# Patient Record
Sex: MALE | Race: WHITE | HISPANIC OR LATINO | Employment: FULL TIME | URBAN - METROPOLITAN AREA
[De-identification: names, ages, dates, MRNs, and addresses within clinical notes are randomized per-mention and may not be internally consistent; named-entity substitution may affect disease eponyms.]

---

## 2017-02-06 ENCOUNTER — PATIENT MESSAGE (OUTPATIENT)
Dept: FAMILY MEDICINE | Facility: CLINIC | Age: 72
End: 2017-02-06

## 2017-02-07 RX ORDER — NEBIVOLOL 10 MG/1
10 TABLET ORAL DAILY
Qty: 90 TABLET | Refills: 3 | Status: SHIPPED | OUTPATIENT
Start: 2017-02-07 | End: 2018-01-26 | Stop reason: SDUPTHER

## 2017-11-13 ENCOUNTER — PATIENT MESSAGE (OUTPATIENT)
Dept: FAMILY MEDICINE | Facility: CLINIC | Age: 72
End: 2017-11-13

## 2017-12-02 ENCOUNTER — PATIENT MESSAGE (OUTPATIENT)
Dept: FAMILY MEDICINE | Facility: CLINIC | Age: 72
End: 2017-12-02

## 2017-12-04 ENCOUNTER — TELEPHONE (OUTPATIENT)
Dept: FAMILY MEDICINE | Facility: CLINIC | Age: 72
End: 2017-12-04

## 2017-12-04 DIAGNOSIS — H53.9 VISION ABNORMALITIES: Primary | ICD-10-CM

## 2018-01-26 ENCOUNTER — OFFICE VISIT (OUTPATIENT)
Dept: FAMILY MEDICINE | Facility: CLINIC | Age: 73
End: 2018-01-26
Payer: MEDICARE

## 2018-01-26 VITALS
SYSTOLIC BLOOD PRESSURE: 113 MMHG | OXYGEN SATURATION: 98 % | TEMPERATURE: 98 F | DIASTOLIC BLOOD PRESSURE: 71 MMHG | BODY MASS INDEX: 27.98 KG/M2 | HEART RATE: 58 BPM | WEIGHT: 206.56 LBS | HEIGHT: 72 IN

## 2018-01-26 DIAGNOSIS — G47.33 OBSTRUCTIVE SLEEP APNEA SYNDROME: ICD-10-CM

## 2018-01-26 DIAGNOSIS — Z00.00 MEDICARE ANNUAL WELLNESS VISIT, SUBSEQUENT: Primary | ICD-10-CM

## 2018-01-26 DIAGNOSIS — I10 ESSENTIAL HYPERTENSION: ICD-10-CM

## 2018-01-26 DIAGNOSIS — E78.5 HYPERLIPIDEMIA, UNSPECIFIED HYPERLIPIDEMIA TYPE: ICD-10-CM

## 2018-01-26 DIAGNOSIS — G47.00 INSOMNIA, UNSPECIFIED TYPE: ICD-10-CM

## 2018-01-26 PROCEDURE — 99213 OFFICE O/P EST LOW 20 MIN: CPT | Mod: PBBFAC,PO | Performed by: FAMILY MEDICINE

## 2018-01-26 PROCEDURE — G0439 PPPS, SUBSEQ VISIT: HCPCS | Mod: ,,, | Performed by: FAMILY MEDICINE

## 2018-01-26 PROCEDURE — 99999 PR PBB SHADOW E&M-EST. PATIENT-LVL III: CPT | Mod: PBBFAC,,, | Performed by: FAMILY MEDICINE

## 2018-01-26 RX ORDER — NITROGLYCERIN 4 MG/G
OINTMENT RECTAL
Refills: 0 | COMMUNITY
Start: 2018-01-22 | End: 2019-02-20

## 2018-01-26 RX ORDER — CIPROFLOXACIN 500 MG/1
TABLET ORAL
COMMUNITY
Start: 2017-10-31 | End: 2018-01-26

## 2018-01-26 RX ORDER — NEBIVOLOL 10 MG/1
10 TABLET ORAL DAILY
Qty: 90 TABLET | Refills: 3 | Status: SHIPPED | OUTPATIENT
Start: 2018-01-26 | End: 2019-01-28 | Stop reason: SDUPTHER

## 2018-01-26 RX ORDER — ONDANSETRON 8 MG/1
TABLET, ORALLY DISINTEGRATING ORAL
COMMUNITY
Start: 2017-10-30 | End: 2018-01-26

## 2018-01-26 RX ORDER — LISINOPRIL 20 MG/1
20 TABLET ORAL DAILY
COMMUNITY
End: 2018-01-26 | Stop reason: SDUPTHER

## 2018-01-26 RX ORDER — HYDROXYZINE PAMOATE 50 MG/1
50 CAPSULE ORAL NIGHTLY
Qty: 90 CAPSULE | Refills: 3 | Status: SHIPPED | OUTPATIENT
Start: 2018-01-26 | End: 2019-01-28 | Stop reason: SDUPTHER

## 2018-01-26 RX ORDER — LIDOCAINE HYDROCHLORIDE 20 MG/ML
JELLY TOPICAL
Refills: 0 | COMMUNITY
Start: 2018-01-22 | End: 2019-02-20

## 2018-01-26 RX ORDER — LISINOPRIL 20 MG/1
20 TABLET ORAL DAILY
Qty: 90 TABLET | Refills: 3 | Status: SHIPPED | OUTPATIENT
Start: 2018-01-26 | End: 2018-12-21

## 2018-01-26 NOTE — PROGRESS NOTES
(Portions of this note were dictated using voice recognition software and may contain dictation related errors in spelling/grammar/syntax not found on text review)    CC:   Chief Complaint   Patient presents with    Annual Exam       HPI: 72 y.o. male here for annual wellness visit.  Last visit was 12/20/16.    Hypertension on Bystolic 10 mg daily, lisinopril 20 mg daily.     Hyperlipidemia, takes red yeast rice.  No other statin therapy.  Was on Lipitor in the past was concerned about some potential cognitive side effects resulting from this and got off of this    Hydroxyzine for sleep    Prior history of vitamin D deficiency, currently taking vitamin D regularly 1000 units daily.    Past Medical History:   Diagnosis Date    Hyperlipidemia     Hypertension     Insomnia     CRIS (obstructive sleep apnea)        Past Surgical History:   Procedure Laterality Date    BASAL CELL CARCINOMA EXCISION      cataract surgery         Family History   Problem Relation Age of Onset    Prostate cancer Brother      prostate problems, unknown cancer    Heart attack Brother 72    Hypertension Brother        Social History     Social History    Marital status:      Spouse name: N/A    Number of children: N/A    Years of education: N/A     Occupational History    primary care physician, [employer]      Social History Main Topics    Smoking status: Former Smoker    Smokeless tobacco: Not on file    Alcohol use No    Drug use: No    Sexual activity: Not on file     Other Topics Concern    Not on file     Social History Narrative    Regular exercise .  Healthy diet     SCREENINGS:    Depression Screening PHQ-9     Over the last two weeks, how often have you been bothered by any of the following problems?  Not at all  Several days  More than half the days  Nearly every day    Little interest or pleasure in doing things  0 1 2 3   Feeling down, depressed, or hopeless  0 1 2 3   Trouble falling or staying  asleep, or sleeping too much  0 1 2 3   Feeling tired or having little energy  0 1 2 3   Poor appetite or overeating  0 1 2 3   Feeling bad about yourself, or that you are a failure, or have let yourself or your family down  0 1 2 3   Trouble concentrating on things, such as reading the newspaper or watching television  0 1 2 3   Moving or speaking so slowly that other people could have noticed? Or the opposite, being so fidgety or restless that you have been moving around a lot more than usual  0 1 2 3   Thoughts that you would be better off dead or of hurting yourself in some way  0 1 2 3   Total 0_ =  ___ + ___ + ___ + ___   PHQ-9 Score ?10: Likely major depression.    Depression score ranges:    5 to 9: mild    10 to 14: moderate    15 to 19: moderately severe    ?20: severe    If you checked off any problems, how difficult have these problems made it for you to do your work, take care of things at home, or get along with other people?  Not difficult at all  ___ Somewhat difficult  ___ Very difficult  ___ Extremely difficult  ___      Function ability and level of safety  Hearing impairment: no  ADL ability: no  Fall risk: no  Home safety issues: no     End of Life Planning  Living will/advance directive? yes       Immunizations  Flu shot  utd  Zostavax 2012  Pneumovax: up to date   Prevnar : utd  tdap 2016     Prostate screening: Declined screening for now    Colonoscopy: Sees Dr. Morris every 5 years for screening colonoscopy last colonoscopy was in 2016     Lab Results   Component Value Date    WBC 8.63 12/31/2016    HGB 15.0 12/31/2016    HCT 42.7 12/31/2016     12/31/2016    CHOL 134 12/31/2016    TRIG 259 (H) 12/31/2016    HDL 30 (L) 12/31/2016    ALT 26 12/31/2016    AST 28 12/31/2016     12/31/2016    K 4.3 12/31/2016     12/31/2016    CREATININE 1.1 12/31/2016    CALCIUM 8.5 (L) 12/31/2016    BUN 15 12/31/2016    CO2 26 12/31/2016    TSH 1.117 12/31/2016    PSA 2.23 10/13/2012     LDLCALC 52.2 (L) 12/31/2016    GLU 91 12/31/2016           ROS:  GENERAL: No fever, chills, fatigability or weight loss.  SKIN: No rashes, no itching.  HEAD: No headaches.  EYES: No visual changes  EARS: No ear pain or changes in hearing.  NOSE: No congestion or rhinorrhea.  MOUTH & THROAT: No hoarseness, change in voice, or sore throat.  NODES: Denies swollen glands.  CHEST: Denies VALLADARES, cyanosis, wheezing, cough and sputum production.  CARDIOVASCULAR: Denies chest pain, PND, orthopnea.  ABDOMEN: No nausea, vomiting, or changes in bowel function.  URINARY: No flank pain, dysuria or hematuria.  PERIPHERAL VASCULAR: No claudication or cyanosis.  MUSCULOSKELETAL: No joint stiffness or swelling. Denies back pain.  NEUROLOGIC: No weakness or numbness.    Vital signs reviewed  PE:   APPEARANCE: Well nourished, well developed, in no acute distress.    HEAD: Normocephalic, atraumatic.  EYES: PERRL. EOMI.   Conjunctivae noninjected.  EARS: TM's intact. Light reflex normal. No retraction or perforation  NOSE: Mucosa pink. Airway clear.  MOUTH & THROAT: No tonsillar enlargement. No pharyngeal erythema or exudate.   NECK: Supple with no cervical lymphadenopathy.    CHEST: Good inspiratory effort. Lungs clear to auscultation with no wheezes or crackles.  CARDIOVASCULAR: Normal S1, S2. No rubs, murmurs, or gallops.  ABDOMEN: Bowel sounds normal. Not distended. Soft. No tenderness or masses. No organomegaly.  EXTREMITIES: No edema, cyanosis, or clubbing.      IMPRESSION    1. Medicare annual wellness visit, subsequent    2. Essential hypertension    3. Hyperlipidemia, unspecified hyperlipidemia type    4. Obstructive sleep apnea syndrome    5. Insomnia, unspecified type          PLAN  Hypertension controlled.  Continue current therapy    Check labs below    Continue Vistaril for insomnia as needed    Discussed prostate cancer screening the patient declines at this time    Continue vitamin D 1000 units daily.  Discussed getting lab  testing for vitamin D although patient defers for now.    Follow-up one year or when necessary    Orders Placed This Encounter   Procedures    CBC auto differential    Comprehensive metabolic panel    Lipid panel    TSH

## 2018-01-27 ENCOUNTER — LAB VISIT (OUTPATIENT)
Dept: LAB | Facility: HOSPITAL | Age: 73
End: 2018-01-27
Attending: FAMILY MEDICINE
Payer: MEDICARE

## 2018-01-27 DIAGNOSIS — E78.5 HYPERLIPIDEMIA, UNSPECIFIED HYPERLIPIDEMIA TYPE: ICD-10-CM

## 2018-01-27 DIAGNOSIS — I10 ESSENTIAL HYPERTENSION: ICD-10-CM

## 2018-01-27 DIAGNOSIS — Z00.00 MEDICARE ANNUAL WELLNESS VISIT, SUBSEQUENT: ICD-10-CM

## 2018-01-27 LAB
ALBUMIN SERPL BCP-MCNC: 3.9 G/DL
ALP SERPL-CCNC: 62 U/L
ALT SERPL W/O P-5'-P-CCNC: 27 U/L
ANION GAP SERPL CALC-SCNC: 7 MMOL/L
AST SERPL-CCNC: 27 U/L
BASOPHILS # BLD AUTO: 0.01 K/UL
BASOPHILS NFR BLD: 0.2 %
BILIRUB SERPL-MCNC: 0.9 MG/DL
BUN SERPL-MCNC: 15 MG/DL
CALCIUM SERPL-MCNC: 9.5 MG/DL
CHLORIDE SERPL-SCNC: 103 MMOL/L
CHOLEST SERPL-MCNC: 187 MG/DL
CHOLEST/HDLC SERPL: 6 {RATIO}
CO2 SERPL-SCNC: 30 MMOL/L
CREAT SERPL-MCNC: 1.1 MG/DL
DIFFERENTIAL METHOD: NORMAL
EOSINOPHIL # BLD AUTO: 0.1 K/UL
EOSINOPHIL NFR BLD: 1.3 %
ERYTHROCYTE [DISTWIDTH] IN BLOOD BY AUTOMATED COUNT: 13.5 %
EST. GFR  (AFRICAN AMERICAN): >60 ML/MIN/1.73 M^2
EST. GFR  (NON AFRICAN AMERICAN): >60 ML/MIN/1.73 M^2
GLUCOSE SERPL-MCNC: 83 MG/DL
HCT VFR BLD AUTO: 46.9 %
HDLC SERPL-MCNC: 31 MG/DL
HDLC SERPL: 16.6 %
HGB BLD-MCNC: 16.2 G/DL
LDLC SERPL CALC-MCNC: 108.8 MG/DL
LYMPHOCYTES # BLD AUTO: 2.4 K/UL
LYMPHOCYTES NFR BLD: 39.2 %
MCH RBC QN AUTO: 31 PG
MCHC RBC AUTO-ENTMCNC: 34.5 G/DL
MCV RBC AUTO: 90 FL
MONOCYTES # BLD AUTO: 0.5 K/UL
MONOCYTES NFR BLD: 7.4 %
NEUTROPHILS # BLD AUTO: 3.2 K/UL
NEUTROPHILS NFR BLD: 51.9 %
NONHDLC SERPL-MCNC: 156 MG/DL
PLATELET # BLD AUTO: 207 K/UL
PMV BLD AUTO: 9.9 FL
POTASSIUM SERPL-SCNC: 4.5 MMOL/L
PROT SERPL-MCNC: 7.5 G/DL
RBC # BLD AUTO: 5.22 M/UL
SODIUM SERPL-SCNC: 140 MMOL/L
TRIGL SERPL-MCNC: 236 MG/DL
TSH SERPL DL<=0.005 MIU/L-ACNC: 1.2 UIU/ML
WBC # BLD AUTO: 6.23 K/UL

## 2018-01-27 PROCEDURE — 80053 COMPREHEN METABOLIC PANEL: CPT

## 2018-01-27 PROCEDURE — 36415 COLL VENOUS BLD VENIPUNCTURE: CPT

## 2018-01-27 PROCEDURE — 80061 LIPID PANEL: CPT

## 2018-01-27 PROCEDURE — 85025 COMPLETE CBC W/AUTO DIFF WBC: CPT

## 2018-01-27 PROCEDURE — 84443 ASSAY THYROID STIM HORMONE: CPT

## 2018-01-28 ENCOUNTER — PATIENT MESSAGE (OUTPATIENT)
Dept: FAMILY MEDICINE | Facility: CLINIC | Age: 73
End: 2018-01-28

## 2018-12-20 ENCOUNTER — PATIENT MESSAGE (OUTPATIENT)
Dept: FAMILY MEDICINE | Facility: CLINIC | Age: 73
End: 2018-12-20

## 2018-12-21 RX ORDER — LOSARTAN POTASSIUM 50 MG/1
50 TABLET ORAL DAILY
Qty: 90 TABLET | Refills: 3 | Status: SHIPPED | OUTPATIENT
Start: 2018-12-21 | End: 2019-11-05

## 2018-12-21 NOTE — TELEPHONE ENCOUNTER
Spoke to patient.  Was concerned about some coughing that he had had recently.  Resulting from a bad cold but the cough was persistent.  He stopped the lisinopril and the cough did get better.  He is not sure if it is truly ACE-inhibitor related or just related to the URI that he had.  However, he would like to try to switch over to losartan 50 mg instead.  This was sent to his pharmacy.

## 2019-01-28 ENCOUNTER — OFFICE VISIT (OUTPATIENT)
Dept: FAMILY MEDICINE | Facility: CLINIC | Age: 74
End: 2019-01-28
Payer: MEDICARE

## 2019-01-28 VITALS
OXYGEN SATURATION: 98 % | HEIGHT: 72 IN | DIASTOLIC BLOOD PRESSURE: 72 MMHG | SYSTOLIC BLOOD PRESSURE: 115 MMHG | BODY MASS INDEX: 28.01 KG/M2 | HEART RATE: 69 BPM | WEIGHT: 206.81 LBS | TEMPERATURE: 98 F

## 2019-01-28 DIAGNOSIS — Z00.00 MEDICARE ANNUAL WELLNESS VISIT, SUBSEQUENT: Primary | ICD-10-CM

## 2019-01-28 DIAGNOSIS — G47.33 OBSTRUCTIVE SLEEP APNEA SYNDROME: ICD-10-CM

## 2019-01-28 DIAGNOSIS — E78.5 HYPERLIPIDEMIA, UNSPECIFIED HYPERLIPIDEMIA TYPE: ICD-10-CM

## 2019-01-28 DIAGNOSIS — G47.00 INSOMNIA, UNSPECIFIED TYPE: ICD-10-CM

## 2019-01-28 DIAGNOSIS — I10 ESSENTIAL HYPERTENSION: ICD-10-CM

## 2019-01-28 PROCEDURE — 3074F PR MOST RECENT SYSTOLIC BLOOD PRESSURE < 130 MM HG: ICD-10-PCS | Mod: CPTII,S$GLB,ICN, | Performed by: FAMILY MEDICINE

## 2019-01-28 PROCEDURE — 99214 OFFICE O/P EST MOD 30 MIN: CPT | Mod: S$GLB,ICN,, | Performed by: FAMILY MEDICINE

## 2019-01-28 PROCEDURE — 99214 PR OFFICE/OUTPT VISIT, EST, LEVL IV, 30-39 MIN: ICD-10-PCS | Mod: S$GLB,ICN,, | Performed by: FAMILY MEDICINE

## 2019-01-28 PROCEDURE — 3078F DIAST BP <80 MM HG: CPT | Mod: CPTII,S$GLB,ICN, | Performed by: FAMILY MEDICINE

## 2019-01-28 PROCEDURE — 3078F PR MOST RECENT DIASTOLIC BLOOD PRESSURE < 80 MM HG: ICD-10-PCS | Mod: CPTII,S$GLB,ICN, | Performed by: FAMILY MEDICINE

## 2019-01-28 PROCEDURE — 3074F SYST BP LT 130 MM HG: CPT | Mod: CPTII,S$GLB,ICN, | Performed by: FAMILY MEDICINE

## 2019-01-28 PROCEDURE — 99999 PR PBB SHADOW E&M-EST. PATIENT-LVL III: CPT | Mod: PBBFAC,,, | Performed by: FAMILY MEDICINE

## 2019-01-28 PROCEDURE — 99999 PR PBB SHADOW E&M-EST. PATIENT-LVL III: ICD-10-PCS | Mod: PBBFAC,,, | Performed by: FAMILY MEDICINE

## 2019-01-28 RX ORDER — HYDROXYZINE PAMOATE 50 MG/1
50 CAPSULE ORAL NIGHTLY
Qty: 90 CAPSULE | Refills: 3 | Status: SHIPPED | OUTPATIENT
Start: 2019-01-28 | End: 2019-11-05

## 2019-01-28 RX ORDER — NEBIVOLOL 10 MG/1
10 TABLET ORAL DAILY
Qty: 90 TABLET | Refills: 3 | Status: SHIPPED | OUTPATIENT
Start: 2019-01-28 | End: 2020-01-31 | Stop reason: SDUPTHER

## 2019-01-28 NOTE — PROGRESS NOTES
(Portions of this note were dictated using voice recognition software and may contain dictation related errors in spelling/grammar/syntax not found on text review)    CC:   Annual exam    HPI: 73 y.o. male here for annual wellness visit.  Last visit was January 26, 2018    Hypertension on Bystolic 10 mg daily, losartan 50 mg a day (was on lisinopril but thought it may be giving him a cough so we switched over to ARB).  Blood pressure stable.  Usually just takes half a losartan a day    Hyperlipidemia, takes red yeast rice.  No other statin therapy.  Was on Lipitor in the past was concerned about some potential cognitive side effects resulting from this and got off of this    Hydroxyzine for sleep    Prior history of vitamin D deficiency, currently taking vitamin D regularly 1000 units daily.    Healthy diet, exercise regularly    Past Medical History:   Diagnosis Date    Hyperlipidemia     Hypertension     Insomnia     CRIS (obstructive sleep apnea)     Vitamin D deficiency        Past Surgical History:   Procedure Laterality Date    BASAL CELL CARCINOMA EXCISION      cataract surgery         Family History   Problem Relation Age of Onset    Prostate cancer Brother         prostate problems, unknown cancer    Heart attack Brother 72    Hypertension Brother        Social History     Socioeconomic History    Marital status:      Spouse name: Not on file    Number of children: Not on file    Years of education: Not on file    Highest education level: Not on file   Social Needs    Financial resource strain: Not on file    Food insecurity - worry: Not on file    Food insecurity - inability: Not on file    Transportation needs - medical: Not on file    Transportation needs - non-medical: Not on file   Occupational History    Occupation: primary care physician, [employer]   Tobacco Use    Smoking status: Former Smoker   Substance and Sexual Activity    Alcohol use: No    Drug use: No     Sexual activity: Not on file   Other Topics Concern    Not on file   Social History Narrative    Regular exercise .  Healthy diet     SCREENINGS:    Depression Screening PHQ-9     Over the last two weeks, how often have you been bothered by any of the following problems?  Not at all  Several days  More than half the days  Nearly every day    Little interest or pleasure in doing things  0 1 2 3   Feeling down, depressed, or hopeless  0 1 2 3   Trouble falling or staying asleep, or sleeping too much  0 1 2 3   Feeling tired or having little energy  0 1 2 3   Poor appetite or overeating  0 1 2 3   Feeling bad about yourself, or that you are a failure, or have let yourself or your family down  0 1 2 3   Trouble concentrating on things, such as reading the newspaper or watching television  0 1 2 3   Moving or speaking so slowly that other people could have noticed? Or the opposite, being so fidgety or restless that you have been moving around a lot more than usual  0 1 2 3   Thoughts that you would be better off dead or of hurting yourself in some way  0 1 2 3   Total 0_ =  ___ + ___ + ___ + ___   PHQ-9 Score ?10: Likely major depression.    Depression score ranges:    5 to 9: mild    10 to 14: moderate    15 to 19: moderately severe    ?20: severe    If you checked off any problems, how difficult have these problems made it for you to do your work, take care of things at home, or get along with other people?  Not difficult at all  ___ Somewhat difficult  ___ Very difficult  ___ Extremely difficult  ___      Function ability and level of safety  Hearing impairment: no  ADL ability: no  Fall risk: no  Home safety issues: no     End of Life Planning  Living will/advance directive? yes         Lab Results   Component Value Date    WBC 6.23 01/27/2018    HGB 16.2 01/27/2018    HCT 46.9 01/27/2018     01/27/2018    CHOL 187 01/27/2018    TRIG 236 (H) 01/27/2018    HDL 31 (L) 01/27/2018    ALT 27 01/27/2018    AST 27  01/27/2018     01/27/2018    K 4.5 01/27/2018     01/27/2018    CREATININE 1.1 01/27/2018    CALCIUM 9.5 01/27/2018    BUN 15 01/27/2018    CO2 30 (H) 01/27/2018    TSH 1.198 01/27/2018    PSA 2.23 10/13/2012    LDLCALC 108.8 01/27/2018    GLU 83 01/27/2018           ROS:  GENERAL: No fever, chills, fatigability or weight loss.  SKIN: No rashes, no itching.  HEAD: No headaches.  EYES: No visual changes  EARS: No ear pain or changes in hearing.  NOSE: No congestion or rhinorrhea.  MOUTH & THROAT: No hoarseness, change in voice, or sore throat.  NODES: Denies swollen glands.  CHEST: Denies VALLADARES, cyanosis, wheezing, cough and sputum production.  CARDIOVASCULAR: Denies chest pain, PND, orthopnea.  ABDOMEN: No nausea, vomiting, or changes in bowel function.  URINARY: No flank pain, dysuria or hematuria.  PERIPHERAL VASCULAR: No claudication or cyanosis.  MUSCULOSKELETAL: No joint stiffness or swelling. Denies back pain.  NEUROLOGIC: No weakness or numbness.    Vital signs reviewed  PE:   APPEARANCE: Well nourished, well developed, in no acute distress.    HEAD: Normocephalic, atraumatic.  EYES: PERRL. EOMI.   Conjunctivae noninjected.  EARS: TM's intact. Light reflex normal. No retraction or perforation  NOSE: Mucosa pink. Airway clear.  MOUTH & THROAT: No tonsillar enlargement. No pharyngeal erythema or exudate.   NECK: Supple with no cervical lymphadenopathy.  No carotid bruits.  No thyromegaly  CHEST: Good inspiratory effort. Lungs clear to auscultation with no wheezes or crackles.  CARDIOVASCULAR: Normal S1, S2. No rubs, murmurs, or gallops.  ABDOMEN: Bowel sounds normal. Not distended. Soft. No tenderness or masses. No organomegaly.  EXTREMITIES: No edema, cyanosis, or clubbing.      IMPRESSION    1. Medicare annual wellness visit, subsequent    2. Essential hypertension    3. Obstructive sleep apnea syndrome    4. Hyperlipidemia, unspecified hyperlipidemia type    5. Insomnia, unspecified type           PLAN  Hypertension controlled.  Continue current therapy    Check labs below    Continue Vistaril for insomnia as needed    Continue vitamin D 1000 units daily.      Follow-up one year or when necessary    Orders Placed This Encounter   Procedures    CBC auto differential    Comprehensive metabolic panel    Lipid panel    TSH         HEALTH SCREENINGS  Immunizations  Flu shot  utd  Zostavax 2012  Pneumovax: up to date   Prevnar : utd  tdap 2016     Prostate screening: Declined screening for now    Colonoscopy: Sees Dr. Morris every 5 years for screening colonoscopy last colonoscopy was in 2016       Answers for HPI/ROS submitted by the patient on 1/27/2019   activity change: No  unexpected weight change: No  neck pain: No  hearing loss: No  rhinorrhea: No  trouble swallowing: No  eye discharge: No  visual disturbance: No  chest tightness: No  wheezing: No  chest pain: No  palpitations: No  blood in stool: No  constipation: No  vomiting: No  diarrhea: No  polydipsia: No  polyuria: No  difficulty urinating: No  urgency: No  hematuria: No  joint swelling: No  arthralgias: No  headaches: No  weakness: No  confusion: No  dysphoric mood: No

## 2019-02-02 ENCOUNTER — LAB VISIT (OUTPATIENT)
Dept: LAB | Facility: HOSPITAL | Age: 74
End: 2019-02-02
Attending: FAMILY MEDICINE
Payer: MEDICARE

## 2019-02-02 DIAGNOSIS — Z00.00 MEDICARE ANNUAL WELLNESS VISIT, SUBSEQUENT: ICD-10-CM

## 2019-02-02 DIAGNOSIS — E78.5 HYPERLIPIDEMIA, UNSPECIFIED HYPERLIPIDEMIA TYPE: ICD-10-CM

## 2019-02-02 DIAGNOSIS — G47.33 OBSTRUCTIVE SLEEP APNEA SYNDROME: ICD-10-CM

## 2019-02-02 DIAGNOSIS — G47.00 INSOMNIA, UNSPECIFIED TYPE: ICD-10-CM

## 2019-02-02 DIAGNOSIS — I10 ESSENTIAL HYPERTENSION: ICD-10-CM

## 2019-02-02 LAB
ALBUMIN SERPL BCP-MCNC: 3.7 G/DL
ALP SERPL-CCNC: 63 U/L
ALT SERPL W/O P-5'-P-CCNC: 23 U/L
ANION GAP SERPL CALC-SCNC: 10 MMOL/L
AST SERPL-CCNC: 28 U/L
BASOPHILS # BLD AUTO: 0.01 K/UL
BASOPHILS NFR BLD: 0.2 %
BILIRUB SERPL-MCNC: 1 MG/DL
BUN SERPL-MCNC: 10 MG/DL
CALCIUM SERPL-MCNC: 9.4 MG/DL
CHLORIDE SERPL-SCNC: 104 MMOL/L
CHOLEST SERPL-MCNC: 169 MG/DL
CHOLEST/HDLC SERPL: 5.3 {RATIO}
CO2 SERPL-SCNC: 26 MMOL/L
CREAT SERPL-MCNC: 0.9 MG/DL
DIFFERENTIAL METHOD: NORMAL
EOSINOPHIL # BLD AUTO: 0.1 K/UL
EOSINOPHIL NFR BLD: 1.3 %
ERYTHROCYTE [DISTWIDTH] IN BLOOD BY AUTOMATED COUNT: 13.1 %
EST. GFR  (AFRICAN AMERICAN): >60 ML/MIN/1.73 M^2
EST. GFR  (NON AFRICAN AMERICAN): >60 ML/MIN/1.73 M^2
GLUCOSE SERPL-MCNC: 96 MG/DL
HCT VFR BLD AUTO: 47.1 %
HDLC SERPL-MCNC: 32 MG/DL
HDLC SERPL: 18.9 %
HGB BLD-MCNC: 15.5 G/DL
IMM GRANULOCYTES # BLD AUTO: 0.02 K/UL
IMM GRANULOCYTES NFR BLD AUTO: 0.4 %
LDLC SERPL CALC-MCNC: 95.4 MG/DL
LYMPHOCYTES # BLD AUTO: 1.5 K/UL
LYMPHOCYTES NFR BLD: 28.3 %
MCH RBC QN AUTO: 30 PG
MCHC RBC AUTO-ENTMCNC: 32.9 G/DL
MCV RBC AUTO: 91 FL
MONOCYTES # BLD AUTO: 0.5 K/UL
MONOCYTES NFR BLD: 9.3 %
NEUTROPHILS # BLD AUTO: 3.2 K/UL
NEUTROPHILS NFR BLD: 60.5 %
NONHDLC SERPL-MCNC: 137 MG/DL
NRBC BLD-RTO: 0 /100 WBC
PLATELET # BLD AUTO: 188 K/UL
PMV BLD AUTO: 9.8 FL
POTASSIUM SERPL-SCNC: 4.3 MMOL/L
PROT SERPL-MCNC: 7 G/DL
RBC # BLD AUTO: 5.16 M/UL
SODIUM SERPL-SCNC: 140 MMOL/L
TRIGL SERPL-MCNC: 208 MG/DL
TSH SERPL DL<=0.005 MIU/L-ACNC: 1.64 UIU/ML
WBC # BLD AUTO: 5.27 K/UL

## 2019-02-02 PROCEDURE — 84443 ASSAY THYROID STIM HORMONE: CPT

## 2019-02-02 PROCEDURE — 80053 COMPREHEN METABOLIC PANEL: CPT

## 2019-02-02 PROCEDURE — 85025 COMPLETE CBC W/AUTO DIFF WBC: CPT

## 2019-02-02 PROCEDURE — 36415 COLL VENOUS BLD VENIPUNCTURE: CPT | Mod: PO

## 2019-02-02 PROCEDURE — 80061 LIPID PANEL: CPT

## 2019-02-17 ENCOUNTER — HOSPITAL ENCOUNTER (EMERGENCY)
Facility: HOSPITAL | Age: 74
Discharge: HOME | End: 2019-02-17
Attending: EMERGENCY MEDICINE
Payer: MEDICARE

## 2019-02-17 VITALS
TEMPERATURE: 99 F | OXYGEN SATURATION: 96 % | RESPIRATION RATE: 14 BRPM | BODY MASS INDEX: 29.12 KG/M2 | HEART RATE: 62 BPM | HEIGHT: 72 IN | SYSTOLIC BLOOD PRESSURE: 134 MMHG | WEIGHT: 215 LBS | DIASTOLIC BLOOD PRESSURE: 87 MMHG

## 2019-02-17 DIAGNOSIS — J02.9 SORE THROAT: ICD-10-CM

## 2019-02-17 DIAGNOSIS — R49.0 HOARSENESS OF VOICE: Primary | ICD-10-CM

## 2019-02-17 DIAGNOSIS — R91.1 PULMONARY NODULE: ICD-10-CM

## 2019-02-17 LAB
ALBUMIN SERPL BCP-MCNC: 3.7 G/DL
ALP SERPL-CCNC: 63 U/L
ALT SERPL W/O P-5'-P-CCNC: 19 U/L
ANION GAP SERPL CALC-SCNC: 12 MMOL/L
AST SERPL-CCNC: 16 U/L
BASOPHILS # BLD AUTO: 0.02 K/UL
BASOPHILS NFR BLD: 0.2 %
BILIRUB SERPL-MCNC: 0.6 MG/DL
BNP SERPL-MCNC: 203 PG/ML
BUN SERPL-MCNC: 13 MG/DL
BUN SERPL-MCNC: 13 MG/DL (ref 6–30)
CALCIUM SERPL-MCNC: 9.6 MG/DL
CHLORIDE SERPL-SCNC: 101 MMOL/L (ref 95–110)
CHLORIDE SERPL-SCNC: 102 MMOL/L
CO2 SERPL-SCNC: 23 MMOL/L
CREAT SERPL-MCNC: 0.8 MG/DL (ref 0.5–1.4)
CREAT SERPL-MCNC: 0.9 MG/DL
DIFFERENTIAL METHOD: NORMAL
EOSINOPHIL # BLD AUTO: 0.1 K/UL
EOSINOPHIL NFR BLD: 0.6 %
ERYTHROCYTE [DISTWIDTH] IN BLOOD BY AUTOMATED COUNT: 13.2 %
EST. GFR  (AFRICAN AMERICAN): >60 ML/MIN/1.73 M^2
EST. GFR  (NON AFRICAN AMERICAN): >60 ML/MIN/1.73 M^2
GLUCOSE SERPL-MCNC: 102 MG/DL
GLUCOSE SERPL-MCNC: 107 MG/DL (ref 70–110)
HCT VFR BLD AUTO: 45.1 %
HCT VFR BLD CALC: 45 %PCV (ref 36–54)
HGB BLD-MCNC: 15.6 G/DL
IMM GRANULOCYTES # BLD AUTO: 0.04 K/UL
IMM GRANULOCYTES NFR BLD AUTO: 0.4 %
LYMPHOCYTES # BLD AUTO: 2.3 K/UL
LYMPHOCYTES NFR BLD: 23.4 %
MCH RBC QN AUTO: 30.9 PG
MCHC RBC AUTO-ENTMCNC: 34.6 G/DL
MCV RBC AUTO: 89 FL
MONOCYTES # BLD AUTO: 0.6 K/UL
MONOCYTES NFR BLD: 6.2 %
NEUTROPHILS # BLD AUTO: 6.8 K/UL
NEUTROPHILS NFR BLD: 69.2 %
NRBC BLD-RTO: 0 /100 WBC
PLATELET # BLD AUTO: 222 K/UL
PMV BLD AUTO: 9.8 FL
POC IONIZED CALCIUM: 1.05 MMOL/L (ref 1.06–1.42)
POC TCO2 (MEASURED): 24 MMOL/L (ref 23–29)
POTASSIUM BLD-SCNC: 4 MMOL/L (ref 3.5–5.1)
POTASSIUM SERPL-SCNC: 4.2 MMOL/L
PROT SERPL-MCNC: 7.1 G/DL
RBC # BLD AUTO: 5.05 M/UL
SAMPLE: ABNORMAL
SODIUM BLD-SCNC: 138 MMOL/L (ref 136–145)
SODIUM SERPL-SCNC: 137 MMOL/L
TROPONIN I SERPL DL<=0.01 NG/ML-MCNC: 0.01 NG/ML
WBC # BLD AUTO: 9.78 K/UL

## 2019-02-17 PROCEDURE — 84484 ASSAY OF TROPONIN QUANT: CPT

## 2019-02-17 PROCEDURE — 80053 COMPREHEN METABOLIC PANEL: CPT

## 2019-02-17 PROCEDURE — 85025 COMPLETE CBC W/AUTO DIFF WBC: CPT

## 2019-02-17 PROCEDURE — 99284 EMERGENCY DEPT VISIT MOD MDM: CPT | Mod: ,,, | Performed by: EMERGENCY MEDICINE

## 2019-02-17 PROCEDURE — 83880 ASSAY OF NATRIURETIC PEPTIDE: CPT

## 2019-02-17 PROCEDURE — 99285 EMERGENCY DEPT VISIT HI MDM: CPT | Mod: 25

## 2019-02-17 PROCEDURE — 25500020 PHARM REV CODE 255: Performed by: EMERGENCY MEDICINE

## 2019-02-17 PROCEDURE — 87632 RESP VIRUS 6-11 TARGETS: CPT

## 2019-02-17 PROCEDURE — 99284 PR EMERGENCY DEPT VISIT,LEVEL IV: ICD-10-PCS | Mod: ,,, | Performed by: EMERGENCY MEDICINE

## 2019-02-17 RX ADMIN — IOHEXOL 100 ML: 350 INJECTION, SOLUTION INTRAVENOUS at 12:02

## 2019-02-17 NOTE — DISCHARGE INSTRUCTIONS
Very nice to meet you and your family!     - try PPI for silent reflux  - follow up with ENT  - follow up with Pulmonary for nodule  - follow up with Cardiology for BNP    Our goal in the emergency department is to always give you outstanding care and exceptional service. You may receive a survey by mail or e-mail in the next week regarding your experience in our ED. We would greatly appreciate your completing and returning the survey. Your feedback provides us with a way to recognize our staff who give very good care and it helps us learn how to improve when your experience was below our aspiration of excellence.

## 2019-02-17 NOTE — ED NOTES
Pt reports treatment for URI x several weeks. His sone reports worsening symptoms along with hoarseness and foreign body sensation. He denies SOB with exertion, fever, chills, edema.

## 2019-02-17 NOTE — ED NOTES
Patient identifiers verified and correct for Maegan Cleary.    LOC: The patient is awake, alert and aware of environment with an appropriate affect, the patient is oriented x 3 and speaking appropriately.  APPEARANCE: Patient resting comfortably and in no acute distress, patient is clean and well groomed, patient's clothing is properly fastened.  SKIN: The skin is warm and dry, color consistent with ethnicity, patient has normal skin turgor and moist mucus membranes, skin intact, no breakdown or bruising noted.  MUSCULOSKELETAL: Patient moving all extremities spontaneously, no obvious swelling or deformities noted.  CARDIAC: Patient has a normal rate and regular rhythm, no periphreal edema noted, capillary refill < 3 seconds.  ABDOMEN: Soft and non tender to palpation, no distention noted, normoactive bowel sounds present in all four quadrants.  NEUROLOGIC: PERRL, 3 mm bilaterally, eyes open spontaneously, behavior appropriate to situation, follows commands, facial expression symmetrical, bilateral hand grasp equal and even, purposeful motor response noted, normal sensation in all extremities when touched with a finger.

## 2019-02-17 NOTE — ED NOTES
Pt resting in bed aware waiting for CT. Pt on cardiac, bp and o2 monitor. RR 20 even and unlabored. Pt son and wife at bedside. TV turned on for pt.

## 2019-02-17 NOTE — ED PROVIDER NOTES
Encounter Date: 2/17/2019    SCRIBE #1 NOTE: I, Kinga Jenkins, am scribing for, and in the presence of,  Dr. Curry. I have scribed the entire note.       History     Chief Complaint   Patient presents with    Foreign Body In Throat     has sensation like something is stuck, throat is sore took, voice hoars     Time seen by provider: 11:18 AM    73 y.o. male with medical conditions including HTN and HLD, who presents with complaint of foreign body sensation on the right side of the throat. Patient sx started approximately 4 months ago with URI sx including sore throat, cough, and rhinorrhea. Patient started on 2 rounds of abx and steroids which provided short term relief. Per family, patient now has new onset voice change ( hoarsness) as well as appears more uncomfortable at rest. Patient received CXR secondary to sx, all of which were unremarkable. Patient states he has been taking HCTZ for sx with no relief of sx. Patient denies cp, SOB, back pain, neck pain, difficulty ranging neck, neck stiffness, n/v/d,unexpected weight change, or night sweats.            The history is provided by the patient.     Review of patient's allergies indicates:  No Known Allergies  Past Medical History:   Diagnosis Date    Hyperlipidemia     Hypertension     Insomnia     CRIS (obstructive sleep apnea)     Vitamin D deficiency      Past Surgical History:   Procedure Laterality Date    BASAL CELL CARCINOMA EXCISION      cataract surgery       Family History   Problem Relation Age of Onset    Prostate cancer Brother         prostate problems, unknown cancer    Heart attack Brother 72    Hypertension Brother      Social History     Tobacco Use    Smoking status: Former Smoker   Substance Use Topics    Alcohol use: No    Drug use: No     Review of Systems   Constitutional: Negative for chills and fever.   HENT: Positive for sore throat and voice change. Negative for facial swelling.    Eyes: Negative for visual  disturbance.   Respiratory: Negative for shortness of breath.    Cardiovascular: Negative for chest pain.   Gastrointestinal: Negative for nausea and vomiting.   Genitourinary: Negative for dysuria and flank pain.   Musculoskeletal: Negative for back pain, neck pain and neck stiffness.   Neurological: Negative for weakness and numbness.   Psychiatric/Behavioral: Negative for behavioral problems and confusion.       Physical Exam     Initial Vitals [02/17/19 1103]   BP Pulse Resp Temp SpO2   (!) 159/89 69 18 98.8 °F (37.1 °C) 99 %      MAP       --         Physical Exam    Nursing note and vitals reviewed.  Constitutional: He appears well-developed and well-nourished. He is not diaphoretic. No distress.   HENT:   Head: Normocephalic and atraumatic.   Mouth/Throat: Uvula is midline and oropharynx is clear and moist. No oropharyngeal exudate, posterior oropharyngeal edema or tonsillar abscesses.   Moderate posterior oropharyngeal erythema, uvula midline, no posterior pharyngeal fullness.     Neck: Normal range of motion. Neck supple. No JVD present.   No masses   Cardiovascular: Normal rate, regular rhythm, normal heart sounds and intact distal pulses.   Pulmonary/Chest: Breath sounds normal. No respiratory distress. He has no wheezes. He has no rhonchi. He has no rales.   Abdominal: Soft. He exhibits no distension. There is no tenderness.   Musculoskeletal: Normal range of motion. He exhibits no edema.   Lymphadenopathy:     He has no cervical adenopathy.   Neurological: He is alert and oriented to person, place, and time. He has normal strength. No cranial nerve deficit or sensory deficit.   Skin: Skin is warm and dry.         ED Course   Procedures  Labs Reviewed   B-TYPE NATRIURETIC PEPTIDE - Abnormal; Notable for the following components:       Result Value     (*)     All other components within normal limits   ISTAT PROCEDURE - Abnormal; Notable for the following components:    POC Ionized Calcium 1.05  (*)     All other components within normal limits   RESPIRATORY VIRAL PANEL BY PCR   CBC W/ AUTO DIFFERENTIAL   COMPREHENSIVE METABOLIC PANEL   TROPONIN I          Imaging Results          CT Neck Chest With Contrast (XPD) (Final result)  Result time 02/17/19 12:55:01    Final result by Bright Caldera DO (02/17/19 12:55:01)                 Impression:      CT neck: Approximation of the vocal cords bilaterally likely related to breath hold during the scan otherwise unremarkable limited CT neck as detailed above    CT thorax: Few ill-defined subcentimeter ground-glass opacities with slight nodularity in the right upper lobe.  These are nonspecific and may be related to active inflammatory/infectious process however indeterminate.  Clinical correlation and follow-up recommended.      Electronically signed by: Bright Caldera DO  Date:    02/17/2019  Time:    12:55             Narrative:    EXAMINATION:  CT NECK CHEST WITH CONTRAST (XPD)    CLINICAL HISTORY:  R  neck pain, (+) voice change, SOB;    TECHNIQUE:  Low dose axial images, coronal and sagittal reformations were obtained from the skull base to the lung bases following the intravenous administration of 100 mL of Omnipaque 350.    COMPARISON:  None.    FINDINGS:  CT neck: The study is limited by artifact from dental metal and patient motion.    In addition there is limitation by incomplete inclusion of the skullbase and nasopharynx.  Visualized nasopharynx is within normal limits.  There is slight prominence of the palatine tonsils within the oropharynx without focal lesion.    No evidence for epiglottic thickening.    There is approximation of the vocal cords likely related to breath hold during the scan.  Trachea within normal limits.    Visualized parotid glands are within normal limits.  The submandibular and thyroid glands are grossly within normal limits allowing for artifact from motion.    No evidence for adenopathy throughout the visualized  neck.    Degenerative change of the spine with grade 1 anterolisthesis of C2 on C3 and C3 on C4.  Allowing for degenerative changes no evidence for acute fracture of the cervical spine.  There is sclerosis in the left aspect of C7 suggestive for bone island.    CT thorax: Ill-defined ground-glass opacities within the posterior aspect right upper lobe largest measuring 8 mm image 81 series 2 this may represent active inflammatory/infectious process however indeterminate.  For a ground glass nodule 6 mm or larger, Fleischner Society 2017 guidelines recommend follow up with non-contrast chest CT at 6-12 months after discovery. If this nodule persists at that time, additional follow up with non-contrast chest CT is recommended every 2 years until 5 years of stability have been documented.    No pleural effusion or pneumothorax.  No hilar mediastinal mass or adenopathy.  Atherosclerotic plaquing of the aorta and in the distribution of the coronary arteries.    Partially visualized hypodensity likely emanating from the upper pole right kidney the periphery of the posterior right lobe of the liver measuring approximately 3.7 cm with Hounsfield units of 11 suggestive for a cyst.    Small sclerotic focus in the right aspect of the manubrium inferiorly suggestive for bone island.  No evidence for acute fracture or subluxation visualized spine                                 Medical Decision Making:   History:   Old Medical Records: I decided to obtain old medical records.  Initial Assessment:   Emergent evaluation of 73 y.o.male here with persistent sore throat, hoarse voice, and difficulty breathing   Differential Diagnosis:   My differential diagnosis includes, but is not limited to: obstruction secondary to mass, retropharyngeal abscess, epiglottitis, laryngitis, silent reflux, PNA   Clinical Tests:   Lab Tests: Ordered and Reviewed  Radiological Study: Ordered and Reviewed  ED Management:  -labs  -CT neck and  chest  -respiratory culture     1:55 PM  Labs reviewed with no significant abnormalities except for a mildly elevated BNP at 200. Patient has a hx of left atrial enlargement. Normal EF. CT reveals no evidence of mass or abscess. There is a 8 mm right upper lobe pulmonary nodule with ill defined boarders. Recommending patient follow up with pulmonary and ENT. His vitals remain stable. Findings discussed with patient and family. They express understanding.            Scribe Attestation:   Scribe #1: I performed the above scribed service and the documentation accurately describes the services I performed. I attest to the accuracy of the note.    Attending Attestation:           Physician Attestation for Scribe:      Comments: I, Dr. Flora Curry, personally performed the services described in this documentation. All medical record entries made by the scribe were at my direction and in my presence.  I have reviewed the chart and agree that the record reflects my personal performance and is accurate and complete. Flora Curry MD.                 Clinical Impression:   The primary encounter diagnosis was Hoarseness of voice. Diagnoses of Sore throat and Pulmonary nodule were also pertinent to this visit.      Disposition:   Disposition: Discharged  Condition: Stable                        Flora Curry MD  02/18/19 0840

## 2019-02-18 ENCOUNTER — TELEPHONE (OUTPATIENT)
Dept: SLEEP MEDICINE | Facility: CLINIC | Age: 74
End: 2019-02-18

## 2019-02-18 NOTE — TELEPHONE ENCOUNTER
----- Message from Chary Cullen sent at 2/18/2019 11:48 AM CST -----  Contact: Pt  Name of Who is Calling:HINA AGUIRRE [[id number]]    What is the request in detail: Patient would like to be seen soon the 03/19/19 , patient states he needs to have his Cpap machine looked at Please contact to further discuss and advise    Can the clinic reply by [facility]:     What Number to Call Back if not in [facility]: [phone]

## 2019-02-19 ENCOUNTER — TELEPHONE (OUTPATIENT)
Dept: SLEEP MEDICINE | Facility: CLINIC | Age: 74
End: 2019-02-19

## 2019-02-19 ENCOUNTER — TELEPHONE (OUTPATIENT)
Dept: PULMONOLOGY | Facility: CLINIC | Age: 74
End: 2019-02-19

## 2019-02-19 DIAGNOSIS — R05.9 COUGH: Primary | ICD-10-CM

## 2019-02-19 LAB
ENTEROVIRUS: NOT DETECTED
HUMAN BOCAVIRUS: NOT DETECTED
HUMAN CORONAVIRUS, COMMON COLD VIRUS: NOT DETECTED
INFLUENZA A - H1N1-09: NOT DETECTED
PARAINFLUENZA: NOT DETECTED
RVP - ADENOVIRUS: NOT DETECTED
RVP - HUMAN METAPNEUMOVIRUS (HMPV): NOT DETECTED
RVP - INFLUENZA A: NOT DETECTED
RVP - INFLUENZA B: NOT DETECTED
RVP - RESPIRATORY SYNCTIAL VIRUS (RSV) A: NOT DETECTED
RVP - RESPIRATORY VIRAL PANEL, SOURCE: NORMAL
RVP - RHINOVIRUS: NOT DETECTED

## 2019-02-19 NOTE — TELEPHONE ENCOUNTER
----- Message from Binh Renteria sent at 2/18/2019  5:45 PM CST -----  Contact: Doctor Madiha  Doctor is requesting an earlier appt due to being seen at [facility] Studys from Echo test is showing problem from sleep apnea. Doctor would like to be seen this week or next week anytime.     Dr. Cleary can be reached at [phone].    Thank You.

## 2019-02-20 ENCOUNTER — TELEPHONE (OUTPATIENT)
Dept: SLEEP MEDICINE | Facility: CLINIC | Age: 74
End: 2019-02-20

## 2019-02-20 ENCOUNTER — OFFICE VISIT (OUTPATIENT)
Dept: OTOLARYNGOLOGY | Facility: CLINIC | Age: 74
End: 2019-02-20
Payer: MEDICARE

## 2019-02-20 VITALS
WEIGHT: 208.75 LBS | HEART RATE: 70 BPM | BODY MASS INDEX: 28.27 KG/M2 | HEIGHT: 72 IN | DIASTOLIC BLOOD PRESSURE: 72 MMHG | SYSTOLIC BLOOD PRESSURE: 119 MMHG

## 2019-02-20 DIAGNOSIS — R49.0 HOARSENESS OF VOICE: ICD-10-CM

## 2019-02-20 DIAGNOSIS — R09.A2 GLOBUS SENSATION: ICD-10-CM

## 2019-02-20 DIAGNOSIS — K21.9 LARYNGOPHARYNGEAL REFLUX (LPR): Primary | ICD-10-CM

## 2019-02-20 PROCEDURE — 3078F DIAST BP <80 MM HG: CPT | Mod: S$GLB,,, | Performed by: OTOLARYNGOLOGY

## 2019-02-20 PROCEDURE — 99204 PR OFFICE/OUTPT VISIT, NEW, LEVL IV, 45-59 MIN: ICD-10-PCS | Mod: 25,S$GLB,, | Performed by: OTOLARYNGOLOGY

## 2019-02-20 PROCEDURE — 31575 PR LARYNGOSCOPY, FLEXIBLE; DIAGNOSTIC: ICD-10-PCS | Mod: S$GLB,,, | Performed by: OTOLARYNGOLOGY

## 2019-02-20 PROCEDURE — 99999 PR PBB SHADOW E&M-EST. PATIENT-LVL III: ICD-10-PCS | Mod: PBBFAC,,, | Performed by: OTOLARYNGOLOGY

## 2019-02-20 PROCEDURE — 3078F PR MOST RECENT DIASTOLIC BLOOD PRESSURE < 80 MM HG: ICD-10-PCS | Mod: S$GLB,,, | Performed by: OTOLARYNGOLOGY

## 2019-02-20 PROCEDURE — 99999 PR PBB SHADOW E&M-EST. PATIENT-LVL III: CPT | Mod: PBBFAC,,, | Performed by: OTOLARYNGOLOGY

## 2019-02-20 PROCEDURE — 3074F PR MOST RECENT SYSTOLIC BLOOD PRESSURE < 130 MM HG: ICD-10-PCS | Mod: S$GLB,,, | Performed by: OTOLARYNGOLOGY

## 2019-02-20 PROCEDURE — 31575 DIAGNOSTIC LARYNGOSCOPY: CPT | Mod: S$GLB,,, | Performed by: OTOLARYNGOLOGY

## 2019-02-20 PROCEDURE — 1101F PT FALLS ASSESS-DOCD LE1/YR: CPT | Mod: S$GLB,,, | Performed by: OTOLARYNGOLOGY

## 2019-02-20 PROCEDURE — 99204 OFFICE O/P NEW MOD 45 MIN: CPT | Mod: 25,S$GLB,, | Performed by: OTOLARYNGOLOGY

## 2019-02-20 PROCEDURE — 1101F PR PT FALLS ASSESS DOC 0-1 FALLS W/OUT INJ PAST YR: ICD-10-PCS | Mod: S$GLB,,, | Performed by: OTOLARYNGOLOGY

## 2019-02-20 PROCEDURE — 3074F SYST BP LT 130 MM HG: CPT | Mod: S$GLB,,, | Performed by: OTOLARYNGOLOGY

## 2019-02-20 RX ORDER — PANTOPRAZOLE SODIUM 40 MG/1
40 TABLET, DELAYED RELEASE ORAL 2 TIMES DAILY
Qty: 60 TABLET | Refills: 11 | Status: SHIPPED | OUTPATIENT
Start: 2019-02-20 | End: 2019-08-16

## 2019-02-20 NOTE — TELEPHONE ENCOUNTER
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 Ayon not the NP. States he went to the emergency department last Sunday and needs a follow up soon. Patient states he is not asking for a same day appointment, he is requesting to be accommodated next week sometime. Please advise.

## 2019-02-20 NOTE — PROGRESS NOTES
Chief Complaint   Patient presents with    Hoarse     started a month ago    Dysphagia    Cough     constantly clearing the throat    Sore Throat   .     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 The onset was approximately 4 months ago after URI sx including sore throat, cough, and rhinorrhea.He was treated with Levaquin and ZPak along with albuterol inhaler without relief. He now has continued intermittent hoarseness, sore throat, increased throat clearing, globus sensation, and tenderness/painful with burning sensation in the back of the throat. He was recently in the ED on 2/17 for the same symptoms. A CT scan of the neck was performed and was normal.        Past Medical History:   Diagnosis Date    Hyperlipidemia     Hypertension     Insomnia     CRIS (obstructive sleep apnea)     Vitamin D deficiency      Social History     Socioeconomic History    Marital status:      Spouse name: Not on file    Number of children: Not on file    Years of education: Not on file    Highest education level: Not on file   Social Needs    Financial resource strain: Not on file    Food insecurity - worry: Not on file    Food insecurity - inability: Not on file    Transportation needs - medical: Not on file    Transportation needs - non-medical: Not on file   Occupational History    Occupation: primary care physician, [employer]   Tobacco Use    Smoking status: Former Smoker   Substance and Sexual Activity    Alcohol use: No    Drug use: No    Sexual activity: Not on file   Other Topics Concern    Not on file   Social History Narrative    Regular exercise .  Healthy diet     Past Surgical History:   Procedure Laterality Date    BASAL CELL CARCINOMA EXCISION      cataract surgery       Family History   Problem Relation Age of Onset    Prostate cancer  Brother         prostate problems, unknown cancer    Heart attack Brother 72    Hypertension Brother            Review of Systems  General: negative for chills, fever or weight loss  Psychological: negative for mood changes or depression  Ophthalmic: negative for blurry vision, photophobia or eye pain  ENT: see HPI  Respiratory: no cough, shortness of breath, or wheezing  Cardiovascular: no chest pain or dyspnea on exertion  Gastrointestinal: no abdominal pain, change in bowel habits, or black/ bloody stools  Musculoskeletal: negative for gait disturbance or muscular weakness  Neurological: no syncope or seizures; no ataxia  Dermatological: negative for puritis,  rash and jaundice  Hematologic/lymphatic: no easy bruising, no new lumps or bumps      Physical Exam:    Vitals:    02/20/19 1257   BP: 119/72   Pulse: 70       Constitutional: Well appearing / communicating without difficutly.  NAD.  Eyes: EOM I Bilaterally  Head/Face: Normocephalic.  Negative paranasal sinus pressure/tenderness.  Salivary glands WNL.  House Brackmann I Bilaterally.    Right Ear: Auricle normal appearance. External Auditory Canal within normal limits no lesions or masses,TM w/o masses/lesions/perforations. TM mobility noted.   Left Ear: Auricle normal appearance. External Auditory Canal within normal limits no lesions or masses,TM w/o masses/lesions/perforations. TM mobility noted.  Nose: No gross nasal septal deviation. Inferior Turbinates 3+ bilaterally. No septal perforation. No masses/lesions. External nasal skin appears normal without masses/lesions.  Oral Cavity: Gingiva/lips within normal limits.  Dentition/gingiva healthy appearing. Mucus membranes moist. Floor of mouth soft, no masses palpated. Oral Tongue mobile. Hard Palate appears normal.    Oropharynx: Base of tongue appears normal. No masses/lesions noted. Tonsillar fossa/pharyngeal wall without lesions. Posterior oropharynx WNL.  Soft palate without masses. Midline uvula.    Neck/Lymphatic: No LAD I-VI bilaterally.  No thyromegaly.  No masses noted on exam.    Mirror laryngoscopy/nasopharyngoscopy: Active gag reflex.  Unable to perform.    Neuro/Psychiatric: AOx3.  Normal mood and affect.   Cardiovascular: Normal carotid pulses bilaterally, no increasing jugular venous distention noted at cervical region bilaterally.    Respiratory: Normal respiratory effort, no stridor, no retractions noted.      See separate procedure note for FFL.     Diagnostic studies reviewed:  CT neck 2/17/2019: Approximation of the vocal cords bilaterally likely related to breath hold during the scan otherwise unremarkable limited CT neck as detailed above    Assessment:    ICD-10-CM ICD-9-CM    1. Laryngopharyngeal reflux (LPR) K21.9 478.79    2. Hoarseness of voice R49.0 784.42    3. Globus sensation F45.8 306.4      The primary encounter diagnosis was Laryngopharyngeal reflux (LPR). Diagnoses of Hoarseness of voice and Globus sensation were also pertinent to this visit.      Plan:  No orders of the defined types were placed in this encounter.      The patient has the physical findings of chronic laryngopharyngeal reflux, which I believe is the cause of the hoarseness. I explained to the patient how it is common to experience throat discomfort, a foreign body sensation, problems swallowing, chronic cough and hoarseness among other things due to reflux, even in the absence of heartburn. Smaller volumes of gastric contents are required to irritate the pharynx than the lower esophagus. Often patients with significant reflux and heartburn will seek medical treatment earlier.     I recommended that the patient start taking Protonix 40mg PO BID* and provided a prescription.     I also recommended that the patient refrain from eating within 3 hours of going to bed at night and that the patient place a 2 x 4 underneath the head board of the bed to elevate the head of bed very subtly and optimize the impact of  gravity on the potential reflux.  I recommended that the patient avoid alcohol, caffeine, tobacco, tomato sauce, spicy foods, fried food, and chocolate.     There is a potential long-term risk of reflux for the development of esophageal cancer, and the data on head and neck malignancy is unclear. Therefore it is important for the patient to be compliant with these recommendations. The patient may benefit from an evaluation by Gastroenterology if the symptoms fail to improve or worsen. Follow up in 6 weeks  to reassess progress with treatment regimen.       Jacquelin Mckeon MD

## 2019-02-20 NOTE — PATIENT INSTRUCTIONS
Tips to Control Acid Reflux    To control acid reflux, youll need to make some basic diet and lifestyle changes. The simple steps outlined below may be all youll need to ease discomfort.  Watch what you eat  · Avoid fatty foods and spicy foods.  · Eat fewer acidic foods, such as citrus and tomato-based foods. These can increase symptoms.  · Limit drinking alcohol, caffeine, and fizzy beverages. All increase acid reflux.  · Try limiting chocolate, peppermint, and spearmint. These can worsen acid reflux in some people.  Watch when you eat  · Avoid lying down for 3 hours after eating.  · Do not snack before going to bed.  Raise your head  Raising your head and upper body by 4 to 6 inches helps limit reflux when youre lying down. Put blocks under the head of your bed frame to raise it.  Other changes  · Lose weight, if you need to  · Dont exercise near bedtime  · Avoid tight-fitting clothes  · Limit aspirin and ibuprofen  · Stop smoking   Date Last Reviewed: 7/1/2016  © 5247-7201 [employer]. [address] [place] [address], [place] [address]. All rights reserved. This information is not intended as a substitute for professional medical care. Always follow your healthcare professional's instructions.

## 2019-02-20 NOTE — PROCEDURES
Procedures     Due to indication in patient's history, presentation or risk factors,  a fiber optic exam was performed.    SEPARATE PROCEDURE NOTE:    ANESTHESIA:  Topical xylocaine with kavin-synephrine    FINDINGS:   severe inaterarytenoid erythema and edema    PROCEDURE:  After verbal consent was obtained, the flexible scope was passed through the patient's nasal cavity without difficulty.  The nasopharynx (adenoid pad) and eustachian tube orifices were first visualized and were found to be normal, without masses or irregularity.  The posterior pharyngeal wall and base of tongue were then examined and no mass or irregular tissue was seen.  The scope was then advanced to the larynx, and the epiglottis, valleculae, and piriform sinuses were normal, without masses or mucosal irregularity.  The false vocal folds and true vocal folds were then examined and were found to have normal mobility (full abduction and adduction) and no masses or mucosal irregularity was seen.  The interartyenoid area had severe edema and erythema consistent with reflux.

## 2019-02-20 NOTE — TELEPHONE ENCOUNTER
No sooner apppointment available with Dr. Ayon. Appointment schedule with BOLA Martinez 02/22/2019 @[place].

## 2019-02-22 ENCOUNTER — HOSPITAL ENCOUNTER (OUTPATIENT)
Dept: PULMONOLOGY | Facility: CLINIC | Age: 74
Discharge: HOME | End: 2019-02-22
Payer: MEDICARE

## 2019-02-22 ENCOUNTER — OFFICE VISIT (OUTPATIENT)
Dept: PULMONOLOGY | Facility: CLINIC | Age: 74
End: 2019-02-22
Payer: MEDICARE

## 2019-02-22 VITALS
HEIGHT: 72 IN | OXYGEN SATURATION: 97 % | DIASTOLIC BLOOD PRESSURE: 72 MMHG | WEIGHT: 208 LBS | HEART RATE: 74 BPM | BODY MASS INDEX: 28.17 KG/M2 | SYSTOLIC BLOOD PRESSURE: 120 MMHG

## 2019-02-22 DIAGNOSIS — K21.9 LARYNGOPHARYNGEAL REFLUX (LPR): ICD-10-CM

## 2019-02-22 DIAGNOSIS — I50.32 CHRONIC DIASTOLIC HEART FAILURE: ICD-10-CM

## 2019-02-22 DIAGNOSIS — R05.9 COUGH: ICD-10-CM

## 2019-02-22 DIAGNOSIS — R91.1 PULMONARY NODULE: Primary | ICD-10-CM

## 2019-02-22 DIAGNOSIS — R91.1 LUNG NODULE: ICD-10-CM

## 2019-02-22 LAB
PRE FEV1 FVC: 77
PRE FEV1: 3.2
PRE FVC: 4.16
PREDICTED FEV1 FVC: 78
PREDICTED FEV1: 3.59
PREDICTED FVC: 4.52

## 2019-02-22 PROCEDURE — 94729 DIFFUSING CAPACITY: CPT | Mod: S$GLB,,, | Performed by: INTERNAL MEDICINE

## 2019-02-22 PROCEDURE — 94010 BREATHING CAPACITY TEST: CPT | Mod: S$GLB,,, | Performed by: INTERNAL MEDICINE

## 2019-02-22 PROCEDURE — 94729 PR C02/MEMBANE DIFFUSE CAPACITY: ICD-10-PCS | Mod: S$GLB,,, | Performed by: INTERNAL MEDICINE

## 2019-02-22 PROCEDURE — 1101F PR PT FALLS ASSESS DOC 0-1 FALLS W/OUT INJ PAST YR: ICD-10-PCS | Mod: S$GLB,,, | Performed by: INTERNAL MEDICINE

## 2019-02-22 PROCEDURE — 99999 PR PBB SHADOW E&M-EST. PATIENT-LVL III: ICD-10-PCS | Mod: PBBFAC,,, | Performed by: INTERNAL MEDICINE

## 2019-02-22 PROCEDURE — 94010 BREATHING CAPACITY TEST: ICD-10-PCS | Mod: S$GLB,,, | Performed by: INTERNAL MEDICINE

## 2019-02-22 PROCEDURE — 3078F PR MOST RECENT DIASTOLIC BLOOD PRESSURE < 80 MM HG: ICD-10-PCS | Mod: S$GLB,,, | Performed by: INTERNAL MEDICINE

## 2019-02-22 PROCEDURE — 3074F PR MOST RECENT SYSTOLIC BLOOD PRESSURE < 130 MM HG: ICD-10-PCS | Mod: S$GLB,,, | Performed by: INTERNAL MEDICINE

## 2019-02-22 PROCEDURE — 99204 OFFICE O/P NEW MOD 45 MIN: CPT | Mod: 25,S$GLB,, | Performed by: INTERNAL MEDICINE

## 2019-02-22 PROCEDURE — 1101F PT FALLS ASSESS-DOCD LE1/YR: CPT | Mod: S$GLB,,, | Performed by: INTERNAL MEDICINE

## 2019-02-22 PROCEDURE — 3078F DIAST BP <80 MM HG: CPT | Mod: S$GLB,,, | Performed by: INTERNAL MEDICINE

## 2019-02-22 PROCEDURE — 3074F SYST BP LT 130 MM HG: CPT | Mod: S$GLB,,, | Performed by: INTERNAL MEDICINE

## 2019-02-22 PROCEDURE — 99204 PR OFFICE/OUTPT VISIT, NEW, LEVL IV, 45-59 MIN: ICD-10-PCS | Mod: 25,S$GLB,, | Performed by: INTERNAL MEDICINE

## 2019-02-22 PROCEDURE — 99999 PR PBB SHADOW E&M-EST. PATIENT-LVL III: CPT | Mod: PBBFAC,,, | Performed by: INTERNAL MEDICINE

## 2019-02-22 NOTE — PROGRESS NOTES
Subjective:       Patient ID: Maegan Cleary is a 73 y.o. male.    Chief Complaint: Cough    73 year old whose past medical history is only significant for HTN.  Began with URI type symptoms 4 months prior and cough treated with zpak, steroids x two rounds.  Cough persisted although began to improve, however, he began to have a foreign body sensation in the back of his throat and visited the ED. CT of chest performed and found a RUL semisolid nodule.  Outpatient echo was also performed.  He has since been diagnosed with LPR started on omeprazole and is using medications for URI/AR type symptoms.  Overall, he is in good health without chronic dyspnea on exertion.  All symptoms are improving just not resolved.       Review of Systems   Constitutional: Negative for fever, weight loss and weight gain.   HENT: Negative for trouble swallowing.    Eyes: Negative for itching.   Respiratory: Negative for shortness of breath, wheezing and dyspnea on extertion.    Cardiovascular: Negative for chest pain, palpitations and leg swelling.   Genitourinary: Negative for difficulty urinating.   Endocrine: Negative for cold intolerance and heat intolerance.    Gastrointestinal: Positive for acid reflux.   Neurological: Negative for headaches.   Hematological: Negative for adenopathy.       Objective:       Vitals:    02/22/19 1306   BP: 120/72   BP Location: Left arm   Patient Position: Sitting   Pulse: 74   SpO2: 97%   Weight: 94.3 kg (208 lb)   Height: 6' (1.829 m)     Physical Exam   Constitutional: He is oriented to person, place, and time. He appears well-developed and well-nourished. No distress.   HENT:   Head: Normocephalic.   Right Ear: External ear normal.   Left Ear: External ear normal.   Nose: Nose normal.   Mouth/Throat: Oropharynx is clear and moist.   Neck: Normal range of motion. Neck supple. No tracheal deviation present. No thyromegaly present.   Cardiovascular: Normal rate, regular rhythm, normal heart sounds and  intact distal pulses.   Pulmonary/Chest: Normal expansion and symmetric chest wall expansion. He has no wheezes. He has no rales. He exhibits no tenderness.   Abdominal: Soft. Bowel sounds are normal. He exhibits no distension and no mass. There is no hepatosplenomegaly. There is no tenderness.   Musculoskeletal: Normal range of motion.   Lymphadenopathy: No supraclavicular adenopathy is present.     He has no cervical adenopathy.   Neurological: He is alert and oriented to person, place, and time. No cranial nerve deficit.   Psychiatric: He has a normal mood and affect.     Personal Diagnostic Review  Normal PFTs and normal diffusion.    cT of chest 2/17/2019: 8mm semisolid with possible cavitary component in the RUL.    : Few ill-defined subcentimeter ground-glass opacities with slight nodularity in the right upper lobe.  These are nonspecific and may be related to active inflammatory/infectious process however indeterminate.  Clinical correlation and follow-up recommended.  No flowsheet data found.      Assessment:       1. Pulmonary nodule    2. Lung nodule    3. Laryngopharyngeal reflux (LPR)    4. Cough    5. Chronic diastolic heart failure        Outpatient Encounter Medications as of 2/22/2019   Medication Sig Dispense Refill    aspirin (ECOTRIN) 81 MG EC tablet Take 81 mg by mouth once daily.      cyanocobalamin 2000 MCG tablet Take 5,000 mcg by mouth once daily.      hydrOXYzine pamoate (VISTARIL) 50 MG Cap Take 1 capsule (50 mg total) by mouth nightly. 90 capsule 3    losartan (COZAAR) 50 MG tablet Take 1 tablet (50 mg total) by mouth once daily. 90 tablet 3    nebivolol (BYSTOLIC) 10 MG Tab Take 1 tablet (10 mg total) by mouth once daily. 90 tablet 3    pantoprazole (PROTONIX) 40 MG tablet Take 1 tablet (40 mg total) by mouth 2 (two) times daily. 60 tablet 11    red yeast rice 600 mg Cap Take 600 mg by mouth.      saw palmetto 80 MG capsule Take 80 mg by mouth 2 (two) times daily.      vitamin D  1000 units Tab Take 2,000 mg by mouth once daily.       No facility-administered encounter medications on file as of 2/22/2019.      Orders Placed This Encounter   Procedures    CT Chest Without Contrast     Standing Status:   Future     Standing Expiration Date:   2/22/2020     Plan:     Problem List Items Addressed This Visit     Cough    Overview     Nearly completely resolved.         Pulmonary nodule - Primary    Overview     Will need follow up in three months for 1 cm pulmonary nodule  Likely inflammatory         Relevant Orders    CT Chest Without Contrast    Laryngopharyngeal reflux (LPR)    Overview     Continue PPI per ENT.         Diastolic heart failure    Overview     Grade 2 diastolic dysfunction per last echo 2/18/2019 (will have report scanned into media tab)  With resultant slightly increased PAP.  PFT including diffusion capacity normal..  Continue BP control per Dr. Thomason.             Other Visit Diagnoses     Lung nodule        Relevant Orders    CT Chest Without Contrast

## 2019-02-22 NOTE — LETTER
February 25, 2019      Tobin Thomason MD  [address]  [address]  [facility] [address]           [facility]  [address]  [place] [address]  Phone: [phone]          Patient: Maegan Cleary   MR Number: [id number]   YOB: 1945   Date of Visit: 2/22/2019       Dear Dr. Tobin Thomason:    Thank you for referring Maegan Cleary to me for evaluation. Attached you will find relevant portions of my assessment and plan of care.    If you have questions, please do not hesitate to call me. I look forward to following Maegan Cleary along with you.    Sincerely,    Ivelisse Mooney MD    Enclosure  CC:  No Recipients    If you would like to receive this communication electronically, please contact [email or web address] or [phone] to request more information on [facility] access.    For providers and/or their staff who would like to refer a patient to [facility], please contact us through our one-stop-shop provider referral line, [facility], at [phone].    If you feel you have received this communication in error or would no longer like to receive these types of communications, please e-mail [email or web address]

## 2019-02-25 PROBLEM — K21.9 LARYNGOPHARYNGEAL REFLUX (LPR): Status: ACTIVE | Noted: 2019-02-25

## 2019-02-25 PROBLEM — I50.30 DIASTOLIC HEART FAILURE: Status: ACTIVE | Noted: 2019-02-25

## 2019-02-26 ENCOUNTER — TELEPHONE (OUTPATIENT)
Dept: SLEEP MEDICINE | Facility: CLINIC | Age: 74
End: 2019-02-26

## 2019-02-26 NOTE — TELEPHONE ENCOUNTER
I tried to schedule pt with Dr. Ayon or Dr. Burnett and he doesn't want to wait till April for Dr. Ayon or March 29 for Dr. Burnett. He will go somewhere else.

## 2019-02-26 NOTE — TELEPHONE ENCOUNTER
----- Message from Stacy Ward sent at 2/26/2019  9:58 AM CST -----  Contact: Pt   Name of Who is Calling: HINA AGUIRRE [[id number]]    What is the request in detail: Pt is returning Adrienne's call in regards to scheduling an appt at the [facility]. Pt is a little upset he is not able to schedule as soon as possible and feel he has a heart issue due to his sleep. Please call to further discuss and advise. Pt states if he do not answer on his cell, to please schedule an appt at the [facility] and send it via the portal. Please call to further discuss and advise.     Can the clinic reply by [facility]: No     What Number to Call Back if not in [facility]: [phone]

## 2019-03-04 ENCOUNTER — PATIENT MESSAGE (OUTPATIENT)
Dept: FAMILY MEDICINE | Facility: CLINIC | Age: 74
End: 2019-03-04

## 2019-03-05 ENCOUNTER — PATIENT MESSAGE (OUTPATIENT)
Dept: FAMILY MEDICINE | Facility: CLINIC | Age: 74
End: 2019-03-05

## 2019-03-25 ENCOUNTER — TELEPHONE (OUTPATIENT)
Dept: SLEEP MEDICINE | Facility: CLINIC | Age: 74
End: 2019-03-25

## 2019-03-25 ENCOUNTER — PATIENT MESSAGE (OUTPATIENT)
Dept: SLEEP MEDICINE | Facility: CLINIC | Age: 74
End: 2019-03-25

## 2019-03-25 NOTE — TELEPHONE ENCOUNTER
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 The patient is requesting Monday if available. Please contact to further discuss and advise      Can the clinic reply by [facility]: No       What Number [facility] Back [facility]: [phone]

## 2019-03-25 NOTE — TELEPHONE ENCOUNTER
Called and scheduled first available with Dr. Ayon in [place]. Offered sooner with other providers at other locations, but he declined.    He can come any Monday or Wednesday there is a cancellation. Placed on wait list.    He saw Dr. Ayon in 2013 and states he has been having some emergent health problems that he thinks are related to his sleep apnea and he may need another study done.    He asked that I make Dr. Ayon aware that he is anxious to be seen sooner than her availability allows.

## 2019-03-26 ENCOUNTER — PATIENT MESSAGE (OUTPATIENT)
Dept: SLEEP MEDICINE | Facility: CLINIC | Age: 74
End: 2019-03-26

## 2019-03-26 ENCOUNTER — TELEPHONE (OUTPATIENT)
Dept: OTOLARYNGOLOGY | Facility: CLINIC | Age: 74
End: 2019-03-26

## 2019-03-26 NOTE — TELEPHONE ENCOUNTER
Attempted to call patient to reschedule appointment on 3/29. No answer. Not able to leave voicemail. Will call back

## 2019-04-01 ENCOUNTER — PATIENT MESSAGE (OUTPATIENT)
Dept: SLEEP MEDICINE | Facility: CLINIC | Age: 74
End: 2019-04-01

## 2019-04-01 ENCOUNTER — OFFICE VISIT (OUTPATIENT)
Dept: SLEEP MEDICINE | Facility: CLINIC | Age: 74
End: 2019-04-01
Payer: MEDICARE

## 2019-04-01 VITALS
DIASTOLIC BLOOD PRESSURE: 77 MMHG | BODY MASS INDEX: 28.71 KG/M2 | HEART RATE: 68 BPM | SYSTOLIC BLOOD PRESSURE: 128 MMHG | HEIGHT: 72 IN | WEIGHT: 212 LBS

## 2019-04-01 DIAGNOSIS — G47.33 OSA (OBSTRUCTIVE SLEEP APNEA): Primary | ICD-10-CM

## 2019-04-01 PROCEDURE — 3074F SYST BP LT 130 MM HG: CPT | Mod: CPTII,S$GLB,, | Performed by: PSYCHIATRY & NEUROLOGY

## 2019-04-01 PROCEDURE — 1101F PR PT FALLS ASSESS DOC 0-1 FALLS W/OUT INJ PAST YR: ICD-10-PCS | Mod: CPTII,S$GLB,, | Performed by: PSYCHIATRY & NEUROLOGY

## 2019-04-01 PROCEDURE — 3078F DIAST BP <80 MM HG: CPT | Mod: CPTII,S$GLB,, | Performed by: PSYCHIATRY & NEUROLOGY

## 2019-04-01 PROCEDURE — 1101F PT FALLS ASSESS-DOCD LE1/YR: CPT | Mod: CPTII,S$GLB,, | Performed by: PSYCHIATRY & NEUROLOGY

## 2019-04-01 PROCEDURE — 3078F PR MOST RECENT DIASTOLIC BLOOD PRESSURE < 80 MM HG: ICD-10-PCS | Mod: CPTII,S$GLB,, | Performed by: PSYCHIATRY & NEUROLOGY

## 2019-04-01 PROCEDURE — 99201 PR OFFICE/OUTPT VISIT,NEW,LEVL I: ICD-10-PCS | Mod: S$GLB,,, | Performed by: PSYCHIATRY & NEUROLOGY

## 2019-04-01 PROCEDURE — 99201 PR OFFICE/OUTPT VISIT,NEW,LEVL I: CPT | Mod: S$GLB,,, | Performed by: PSYCHIATRY & NEUROLOGY

## 2019-04-01 PROCEDURE — 3074F PR MOST RECENT SYSTOLIC BLOOD PRESSURE < 130 MM HG: ICD-10-PCS | Mod: CPTII,S$GLB,, | Performed by: PSYCHIATRY & NEUROLOGY

## 2019-04-01 PROCEDURE — 99999 PR PBB SHADOW E&M-EST. PATIENT-LVL III: CPT | Mod: PBBFAC,,, | Performed by: PSYCHIATRY & NEUROLOGY

## 2019-04-01 PROCEDURE — 99999 PR PBB SHADOW E&M-EST. PATIENT-LVL III: ICD-10-PCS | Mod: PBBFAC,,, | Performed by: PSYCHIATRY & NEUROLOGY

## 2019-04-01 NOTE — PROGRESS NOTES
"Goes to sleep 9:30. USing VIstoril/Tylenol/Ibuprofen.  Complains of early awakenings - 3 AM for the last 2 years. Gained weight in the last 6 mouths - more problems with sleep. Also more stres since July.      HISTORY OF PRESENT ILLNESS:  Maegan Cleary  was seen at the request of  [facility] for the evaluation of  sleep apnea.      HISTORY OF PRESENT ILLNESS: Maegan Cleary is a 73 y.o. male is here for sleep evaluation.       CHIEF COMPLAINT:      The patient's complaints include excessive daytime sleepiness, snoring,  witnessed breathing pauses, , excessive daytime fatigue and , gasping for air in sleep,   interrupted sleep over the last  2 years.    Reports  dry mouth and sore throat  Reports nasal congestion   Denies  morning headaches  Reports  interrupted sleep  Denies RLS symptoms  Denies symptoms concerning for parasomnia    The ESS ([place] Sleepiness Score) taken on initial visit is 0 /24    INTERVAL HISTORY:    10/30/13: The patient has not presented any new complaints since the previous visit. He is very compliant on CPAP 7 cm with SM Wisp mask. In fact, he sometimes does not feel "enough air" on 7 cm H2O. He feels more rested on CPAP. It is easier to fall asleep with the machine.He is interested in trying EPAP patches for travelling.    CPAP pressure: 7 cm H2O  Mask comfort / fit:  Wisp SM - fits well    Pressure tolerance: "not enough air"   Humidification: OK   CPAP Interrogation:    Ave daily usage:8:45 hours /7days  Ave daily usage:8:30 hours /30days  Days >4 hours usage: 7 /7 days  Days >4 hours usage: 30/30 days   Machine condition: good       04/01/2019:     Wheezing since viral illness since a couple months ago. Started PPI, combined inhaler (long acting beta-mimetic and steroid) and recently Singulair.  Taking Vystaril and Melatonin and bedtime with good control of insomnia.  Denied daytime sleepiness.    Voice changes.  Reports improvement   8 cm H2O  Machine shows 8-9 hrs   Due " for replacement.  AHI and leak are not available on his older generation CPAP.        SLEEP ROUTINE:      Bed partner: his wife  Time to bed: 9:30-10 PM  Sleep onset latency: 40-60 min  Disruptions or awakenings: once 3- 5 AM  Time to fall back into sleep: not long with CPAP  Wakeup time: 6 AM   Perceived sleep quality: 2-3/5  Perceived total sleep time:  5  hours.  Daytime naps: none  Weekend sleep routine: 10-11 PM - 6 am  Exercise routine: not every day        PSG 1/30/13: Significant Obstructive sleep apnea (CRIS) with AHI (apnea hypopnea Index) of 5.1 and SaO2 of 87%  (weight  198).  CPAP titration on 2/22/13 Effective control of respiratory events was achieved at 2.9 cm of H2O. Weight 198 lbs.        PAST MEDICAL HISTORY:    Active Ambulatory Problems     Diagnosis Date Noted    Hypertension     Hyperlipidemia     Insomnia     Sleep apnea 01/23/2013    Palpitations 07/12/2016    Cough     Pulmonary nodule 02/22/2019    Laryngopharyngeal reflux (LPR) 02/25/2019    Diastolic heart failure 02/25/2019     Resolved Ambulatory Problems     Diagnosis Date Noted    No Resolved Ambulatory Problems     Past Medical History:   Diagnosis Date    Hyperlipidemia     Hypertension     Insomnia     CRIS (obstructive sleep apnea)     Vitamin D deficiency                 PAST SURGICAL HISTORY:    Past Surgical History:   Procedure Laterality Date    BASAL CELL CARCINOMA EXCISION      cataract surgery           FAMILY HISTORY:                Family History   Problem Relation Age of Onset    Prostate cancer Brother         prostate problems, unknown cancer    Heart attack Brother 72    Hypertension Brother        SOCIAL HISTORY:          Tobacco:   Social History     Tobacco Use   Smoking Status Former Smoker       alcohol use:    Social History     Substance and Sexual Activity   Alcohol Use No                 Occupation:Internal Medicine doctor in [place]    ALLERGIES:  Review of patient's allergies  indicates:  No Known Allergies    CURRENT MEDICATIONS:    Current Outpatient Medications   Medication Sig Dispense Refill    aspirin (ECOTRIN) 81 MG EC tablet Take 81 mg by mouth once daily.      cyanocobalamin 2000 MCG tablet Take 5,000 mcg by mouth once daily.      hydrOXYzine pamoate (VISTARIL) 50 MG Cap Take 1 capsule (50 mg total) by mouth nightly. 90 capsule 3    losartan (COZAAR) 50 MG tablet Take 1 tablet (50 mg total) by mouth once daily. 90 tablet 3    nebivolol (BYSTOLIC) 10 MG Tab Take 1 tablet (10 mg total) by mouth once daily. 90 tablet 3    pantoprazole (PROTONIX) 40 MG tablet Take 1 tablet (40 mg total) by mouth 2 (two) times daily. 60 tablet 11    red yeast rice 600 mg Cap Take 600 mg by mouth.      saw palmetto 80 MG capsule Take 80 mg by mouth 2 (two) times daily.      vitamin D 1000 units Tab Take 2,000 mg by mouth once daily.       No current facility-administered medications for this visit.                       REVIEW OF SYSTEMS:   Sleep related symptoms as per HPI    reports weight gain 10 lbs over the last 6 months  Denies dyspnea  Reports palpitations  Reports acid reflux   Denied nocturnal polyuria  Reports occasional  mood diturbance  Denies  anemia  Reports occasional  muscle pain (neck)    Otherwise, a balance of 10 systems reviewed is negative.    PHYSICAL EXAM:  /77   Pulse 68   Ht 6' (1.829 m)   Wt 96.2 kg (212 lb)   BMI 28.75 kg/m²   GENERAL: Normal development, well groomed.  HEENT:   HEENT:  Conjunctivae are non-erythematous; MP III-IV  NECK: Supple. No thyromegaly. No palpable nodes.     SKIN: On face and neck: No abrasions, no rashes, no lesions.  No subcutaneous nodules are palpable.  RESPIRATORY: Chest is clear to auscultation.  Normal chest expansion and non-labored breathing at rest.  CARDIOVASCULAR: Normal S1, S2.  No murmurs, gallops or rubs. No carotid bruits bilaterally.  EXTREMITIES: No edema. No clubbing. No cyanosis. Station normal. Gait normal.         NEURO/PSYCH: Oriented to time, place and person. Normal attention span and concentration. Affect is full. Mood is normal      ASSESSMENT:    1. CRIS. The patient symptomatically has  snoring,  witnessed breathing pauses, gasping for air in sleep and interrupted sleep . The patient has medical co-morbidities of hyperlipidemia and hypertension,  which can be worsened by CRIS.  Benefiting from CPAP use in terms of sleep continuity and daytime sleepiness.       2. Sleep maintenance insomnia NEC, likely multifactorial: significantly improved on CPAP; he does not require a sleeping aide.    3. Possible     4. Insomnia - combine Vystaril + Melatonin    PLAN:    Order APAP 7-15  Continue Wisp SM mask.  Trial pack of EPAP will be faxed to [facility].    Education: During our discussion today, we talked about the etiology of obstructive sleep apnea as well as the potential ramifications of untreated sleep apnea, which could include daytime sleepiness, hypertension, heart disease and/or stroke.  We discussed potential treatment options, which could include weight loss, body positioning, continuous positive airway pressure (CPAP), or referral for surgical consideration. Meanwhile, he  is urged to avoid supine sleep, weight gain and alcoholic beverages since all of these can worsen CRIS.       Precautions: The patient was advised to abstain from driving should he feel sleepy or drowsy.  Follow up: I will see the patient back in 3-4 months.    Thank you for allowing me the opportunity to participate in the care of your patient.

## 2019-04-23 ENCOUNTER — PES CALL (OUTPATIENT)
Dept: ADMINISTRATIVE | Facility: CLINIC | Age: 74
End: 2019-04-23

## 2019-05-27 ENCOUNTER — OFFICE VISIT (OUTPATIENT)
Dept: SLEEP MEDICINE | Facility: CLINIC | Age: 74
End: 2019-05-27
Payer: MEDICARE

## 2019-05-27 VITALS
HEART RATE: 60 BPM | HEIGHT: 72 IN | DIASTOLIC BLOOD PRESSURE: 62 MMHG | WEIGHT: 213.13 LBS | BODY MASS INDEX: 28.87 KG/M2 | SYSTOLIC BLOOD PRESSURE: 104 MMHG

## 2019-05-27 DIAGNOSIS — G47.33 OSA (OBSTRUCTIVE SLEEP APNEA): Primary | ICD-10-CM

## 2019-05-27 PROCEDURE — 99214 PR OFFICE/OUTPT VISIT, EST, LEVL IV, 30-39 MIN: ICD-10-PCS | Mod: S$GLB,,, | Performed by: PSYCHIATRY & NEUROLOGY

## 2019-05-27 PROCEDURE — 99214 OFFICE O/P EST MOD 30 MIN: CPT | Mod: S$GLB,,, | Performed by: PSYCHIATRY & NEUROLOGY

## 2019-05-27 PROCEDURE — 3078F DIAST BP <80 MM HG: CPT | Mod: S$GLB,,, | Performed by: PSYCHIATRY & NEUROLOGY

## 2019-05-27 PROCEDURE — 1101F PR PT FALLS ASSESS DOC 0-1 FALLS W/OUT INJ PAST YR: ICD-10-PCS | Mod: S$GLB,,, | Performed by: PSYCHIATRY & NEUROLOGY

## 2019-05-27 PROCEDURE — 1101F PT FALLS ASSESS-DOCD LE1/YR: CPT | Mod: S$GLB,,, | Performed by: PSYCHIATRY & NEUROLOGY

## 2019-05-27 PROCEDURE — 3074F SYST BP LT 130 MM HG: CPT | Mod: S$GLB,,, | Performed by: PSYCHIATRY & NEUROLOGY

## 2019-05-27 PROCEDURE — 3074F PR MOST RECENT SYSTOLIC BLOOD PRESSURE < 130 MM HG: ICD-10-PCS | Mod: S$GLB,,, | Performed by: PSYCHIATRY & NEUROLOGY

## 2019-05-27 PROCEDURE — 99999 PR PBB SHADOW E&M-EST. PATIENT-LVL III: CPT | Mod: PBBFAC,,, | Performed by: PSYCHIATRY & NEUROLOGY

## 2019-05-27 PROCEDURE — 3078F PR MOST RECENT DIASTOLIC BLOOD PRESSURE < 80 MM HG: ICD-10-PCS | Mod: S$GLB,,, | Performed by: PSYCHIATRY & NEUROLOGY

## 2019-05-27 PROCEDURE — 99999 PR PBB SHADOW E&M-EST. PATIENT-LVL III: ICD-10-PCS | Mod: PBBFAC,,, | Performed by: PSYCHIATRY & NEUROLOGY

## 2019-05-27 NOTE — PROGRESS NOTES
"Goes to sleep 9:30. USing VIstoril/Tylenol/Ibuprofen.  Complains of early awakenings - 3 AM for the last 2 years. Gained weight in the last 6 mouths - more problems with sleep. Also more stres since July.      HISTORY OF PRESENT ILLNESS:  Maegan Cleary  was seen at the request of  No ref. provider found for the evaluation of  sleep apnea.      HISTORY OF PRESENT ILLNESS: Maegan Cleary is a 73 y.o. male is here for sleep evaluation.       CHIEF COMPLAINT:      The patient's complaints include excessive daytime sleepiness, snoring,  witnessed breathing pauses, , excessive daytime fatigue and , gasping for air in sleep,   interrupted sleep over the last  2 years.    Reports  dry mouth and sore throat  Reports nasal congestion   Denies  morning headaches  Reports  interrupted sleep  Denies RLS symptoms  Denies symptoms concerning for parasomnia    The ESS ([place] Sleepiness Score) taken on initial visit is 0 /24    INTERVAL HISTORY:    10/30/13: The patient has not presented any new complaints since the previous visit. He is very compliant on CPAP 7 cm with SM Wisp mask. In fact, he sometimes does not feel "enough air" on 7 cm H2O. He feels more rested on CPAP. It is easier to fall asleep with the machine.He is interested in trying EPAP patches for travelling.    CPAP pressure: 7 cm H2O  Mask comfort / fit:  Wisp SM - fits well    Pressure tolerance: "not enough air"   Humidification: OK   CPAP Interrogation:    Ave daily usage:8:45 hours /7days  Ave daily usage:8:30 hours /30days  Days >4 hours usage: 7 /7 days  Days >4 hours usage: 30/30 days   Machine condition: good       04/01/2019:     Wheezing since viral illness since a couple months ago. Started PPI, combined inhaler (long acting beta-mimetic and steroid) and recently Singulair.  Taking Vystaril and Melatonin and bedtime with good control of insomnia.  Denied daytime sleepiness.    Voice changes.  Reports improvement   8 cm H2O  Machine shows 8-9 hrs "   Due for replacement.  AHI and leak are not available on his older generation CPAP.      05/27/2019:    APAP 7-15  90% - 10 , but sometimes 8    The patient reports improved sleep continuity and daytime sleepiness on PAP. ESS today is 4/24.  Denies break through snoring. No dry mouth.   Therapy Event Summary Date Range: 4/27/2019 - 5/26/2019     Hide      Compliance Summary  Apnea Indices  Ventilator Statistics    Days with Device Usage:  30 days  Average AHI:  1.7  Average Breath Rate:  16.6 bpm    Percentage of Days >=4 Hours:  100.0%  Average OA Index:  0.3  Average % Patient Triggered Breaths:  N/A    Average Usage (Days Used):  8 hrs. 8 mins. 9 secs.  Average CA Index:  0.2  Average Tidal Volume:  458.3 ml    Average Usage (All Days):  8 hrs. 8 mins. 9 secs.    Average Minute Vent:  N/A        Large Leak  Periodic Breathing     Average Time in Large Leak:  26 secs.  Average % of Night in PB:  1.7%     Average % of Night in Large Leak:  0.1%                EPWORTH SLEEPINESS SCALE 5/27/2019   Sitting and reading 0   Watching TV 0   Sitting, inactive in a public place (e.g. a theatre or a meeting) 0   As a passenger in a car for an hour without a break 0   Lying down to rest in the afternoon when circumstances permit 2   Sitting and talking to someone 0   Sitting quietly after a lunch without alcohol 2   In a car, while stopped for a few minutes in traffic 0   Total score 4       PHQ9 4/1/2019   Little interest or pleasure in doing things: Not at all   Feeling down, depressed or hopeless: Not at all   Trouble falling asleep, staying asleep, or sleeping too much: More than half the days   Feeling tired or having little energy: Not at all   Poor appetite or overeating: Not at all   Feeling bad about yourself- or that you are a failure or have let yourself or family down Not at all   Trouble concentrating on things, such as reading the newspaper or watching television: Not at all   Moving or speaking so slowly that  other people could have noticed. Or the opposite- being so fidgety or restless that you have been moving around a lot more than usual: Not at all   Thoughts that you would be better off dead or hurting yourself in some way: Not at all   If you indicated you have experienced any of the aforementioned problems, how difficult have these problems made it for you to do your work, take care of things at home or get along with other people? Not difficult at all   Total Score 2     GAD7 4/1/2019   Feeling nervous, anxious, on edge Not at all   Not being able to stop or control worrying Several days   Worrying too much about different things Several days   Trouble relaxing Several days   Being so restless that its hard to sit still Not at all   Becoming easily annoyed or irritable Not at all   Feeling afraid as if something awful might happen Not at all   If you marked you are experiencing any of the aforementioned problems, how difficult have these made it for you to do your work, take care of things at home, or get along with other people? Not difficult at all   TOMMY-7 Score 3                     SLEEP ROUTINE:      Bed partner: his wife  Time to bed: 9:30-10 PM  Sleep onset latency: 40-60 min  Disruptions or awakenings: once 3- 5 AM  Time to fall back into sleep: not long with CPAP  Wakeup time: 6 AM   Perceived sleep quality: 2-3/5  Perceived total sleep time:  5  hours.  Daytime naps: none  Weekend sleep routine: 10-11 PM - 6 am  Exercise routine: not every day        PSG 1/30/13: Significant Obstructive sleep apnea (CRIS) with AHI (apnea hypopnea Index) of 5.1 and SaO2 of 87%  (weight  198).  CPAP titration on 2/22/13 Effective control of respiratory events was achieved at 2.9 cm of H2O. Weight 198 lbs.        PAST MEDICAL HISTORY:    Active Ambulatory Problems     Diagnosis Date Noted    Hypertension     Hyperlipidemia     Insomnia     Sleep apnea 01/23/2013    Palpitations 07/12/2016    Cough     Pulmonary  nodule 02/22/2019    Laryngopharyngeal reflux (LPR) 02/25/2019    Diastolic heart failure 02/25/2019     Resolved Ambulatory Problems     Diagnosis Date Noted    No Resolved Ambulatory Problems     Past Medical History:   Diagnosis Date    Hyperlipidemia     Hypertension     Insomnia     CRIS (obstructive sleep apnea)     Vitamin D deficiency                 PAST SURGICAL HISTORY:    Past Surgical History:   Procedure Laterality Date    BASAL CELL CARCINOMA EXCISION      cataract surgery           FAMILY HISTORY:                Family History   Problem Relation Age of Onset    Prostate cancer Brother         prostate problems, unknown cancer    Heart attack Brother 72    Hypertension Brother        SOCIAL HISTORY:          Tobacco:   Social History     Tobacco Use   Smoking Status Former Smoker       alcohol use:    Social History     Substance and Sexual Activity   Alcohol Use No                 Occupation:Internal Medicine doctor in [place]    ALLERGIES:  Review of patient's allergies indicates:  No Known Allergies    CURRENT MEDICATIONS:    Current Outpatient Medications   Medication Sig Dispense Refill    aspirin (ECOTRIN) 81 MG EC tablet Take 81 mg by mouth once daily.      cyanocobalamin 2000 MCG tablet Take 5,000 mcg by mouth once daily.      hydrOXYzine pamoate (VISTARIL) 50 MG Cap Take 1 capsule (50 mg total) by mouth nightly. 90 capsule 3    losartan (COZAAR) 50 MG tablet Take 1 tablet (50 mg total) by mouth once daily. 90 tablet 3    nebivolol (BYSTOLIC) 10 MG Tab Take 1 tablet (10 mg total) by mouth once daily. 90 tablet 3    pantoprazole (PROTONIX) 40 MG tablet Take 1 tablet (40 mg total) by mouth 2 (two) times daily. 60 tablet 11    red yeast rice 600 mg Cap Take 600 mg by mouth.      saw palmetto 80 MG capsule Take 80 mg by mouth 2 (two) times daily.      vitamin D 1000 units Tab Take 2,000 mg by mouth once daily.       No current facility-administered medications for this visit.                        REVIEW OF SYSTEMS:   Sleep related symptoms as per HPI    reports weight gain 10 lbs over the last 6 months  Denies dyspnea  Reports palpitations  Reports acid reflux   Denied nocturnal polyuria  Reports occasional  mood diturbance  Denies  anemia  Reports occasional  muscle pain (neck)    Otherwise, a balance of 10 systems reviewed is negative.    PHYSICAL EXAM:  /62 (BP Location: Left arm, Patient Position: Sitting, BP Method: Large (Manual))   Pulse 60   Ht 6' (1.829 m)   Wt 96.7 kg (213 lb 1.6 oz)   BMI 28.90 kg/m²   GENERAL: Normal development, well groomed.  HEENT:   HEENT:  Conjunctivae are non-erythematous; MP III-IV  NECK: Supple. No thyromegaly. No palpable nodes.     SKIN: On face and neck: No abrasions, no rashes, no lesions.  No subcutaneous nodules are palpable.  RESPIRATORY: Chest is clear to auscultation.  Normal chest expansion and non-labored breathing at rest.  CARDIOVASCULAR: Normal S1, S2.  No murmurs, gallops or rubs. No carotid bruits bilaterally.  EXTREMITIES: No edema. No clubbing. No cyanosis. Station normal. Gait normal.        NEURO/PSYCH: Oriented to time, place and person. Normal attention span and concentration. Affect is full. Mood is normal      ASSESSMENT:    1. CRIS. The patient symptomatically has  snoring,  witnessed breathing pauses, gasping for air in sleep and interrupted sleep . The patient has medical co-morbidities of hyperlipidemia and hypertension,  which can be worsened by CRIS.  Benefiting from CPAP use in terms of sleep continuity and daytime sleepiness.       2. Sleep maintenance insomnia NEC, likelImproved on a new APAP + Melatonin and occasional hydroxyzine.    y multifactorial: significantly improved on CPAP; he does not require a sleeping aide.   4. Insomnia - combine Vystaril + Melatonin.     PLAN:    Order APAP 7-15  Continue Wisp SM mask.  Trial pack of EPAP will be faxed to [facility].    Education: During our discussion today, we  talked about the etiology of obstructive sleep apnea as well as the potential ramifications of untreated sleep apnea, which could include daytime sleepiness, hypertension, heart disease and/or stroke.  We discussed potential treatment options, which could include weight loss, body positioning, continuous positive airway pressure (CPAP), or referral for surgical consideration. Meanwhile, he  is urged to avoid supine sleep, weight gain and alcoholic beverages since all of these can worsen CRIS.       Precautions: The patient was advised to abstain from driving should he feel sleepy or drowsy.  Follow up: I will see the patient back in 3-4 months.    Thank you for allowing me the opportunity to participate in the care of your patient.

## 2019-06-05 ENCOUNTER — OFFICE VISIT (OUTPATIENT)
Dept: PULMONOLOGY | Facility: CLINIC | Age: 74
End: 2019-06-05
Payer: MEDICARE

## 2019-06-05 ENCOUNTER — HOSPITAL ENCOUNTER (OUTPATIENT)
Dept: RADIOLOGY | Facility: HOSPITAL | Age: 74
Discharge: HOME | End: 2019-06-05
Attending: INTERNAL MEDICINE
Payer: MEDICARE

## 2019-06-05 VITALS
OXYGEN SATURATION: 97 % | WEIGHT: 212.5 LBS | DIASTOLIC BLOOD PRESSURE: 72 MMHG | SYSTOLIC BLOOD PRESSURE: 120 MMHG | HEART RATE: 68 BPM | HEIGHT: 72 IN | BODY MASS INDEX: 28.78 KG/M2

## 2019-06-05 DIAGNOSIS — R91.1 LUNG NODULE: ICD-10-CM

## 2019-06-05 DIAGNOSIS — R91.8 PULMONARY NODULES/LESIONS, MULTIPLE: Primary | ICD-10-CM

## 2019-06-05 DIAGNOSIS — R91.1 PULMONARY NODULE: ICD-10-CM

## 2019-06-05 PROCEDURE — 99212 OFFICE O/P EST SF 10 MIN: CPT | Mod: S$GLB,,, | Performed by: INTERNAL MEDICINE

## 2019-06-05 PROCEDURE — 71250 CT CHEST WITHOUT CONTRAST: ICD-10-PCS | Mod: 26,,, | Performed by: RADIOLOGY

## 2019-06-05 PROCEDURE — 1101F PR PT FALLS ASSESS DOC 0-1 FALLS W/OUT INJ PAST YR: ICD-10-PCS | Mod: S$GLB,,, | Performed by: INTERNAL MEDICINE

## 2019-06-05 PROCEDURE — 99212 PR OFFICE/OUTPT VISIT, EST, LEVL II, 10-19 MIN: ICD-10-PCS | Mod: S$GLB,,, | Performed by: INTERNAL MEDICINE

## 2019-06-05 PROCEDURE — 3078F PR MOST RECENT DIASTOLIC BLOOD PRESSURE < 80 MM HG: ICD-10-PCS | Mod: S$GLB,,, | Performed by: INTERNAL MEDICINE

## 2019-06-05 PROCEDURE — 3074F PR MOST RECENT SYSTOLIC BLOOD PRESSURE < 130 MM HG: ICD-10-PCS | Mod: S$GLB,,, | Performed by: INTERNAL MEDICINE

## 2019-06-05 PROCEDURE — 71250 CT THORAX DX C-: CPT | Mod: TC

## 2019-06-05 PROCEDURE — 3074F SYST BP LT 130 MM HG: CPT | Mod: S$GLB,,, | Performed by: INTERNAL MEDICINE

## 2019-06-05 PROCEDURE — 1101F PT FALLS ASSESS-DOCD LE1/YR: CPT | Mod: S$GLB,,, | Performed by: INTERNAL MEDICINE

## 2019-06-05 PROCEDURE — 99999 PR PBB SHADOW E&M-EST. PATIENT-LVL III: ICD-10-PCS | Mod: PBBFAC,,, | Performed by: INTERNAL MEDICINE

## 2019-06-05 PROCEDURE — 71250 CT THORAX DX C-: CPT | Mod: 26,,, | Performed by: RADIOLOGY

## 2019-06-05 PROCEDURE — 99999 PR PBB SHADOW E&M-EST. PATIENT-LVL III: CPT | Mod: PBBFAC,,, | Performed by: INTERNAL MEDICINE

## 2019-06-05 PROCEDURE — 3078F DIAST BP <80 MM HG: CPT | Mod: S$GLB,,, | Performed by: INTERNAL MEDICINE

## 2019-06-05 NOTE — PROGRESS NOTES
Subjective:       Patient ID: Maegan Cleary is a 73 y.o. male.    Chief Complaint: Pulmonary Nodules    73 year old who is here for follow up after GGO and nodules were found when he developed a post viral cough that lasted for approximately 12 weeks.  Since that time, cough has resolved without treatment.  Feeling great.  Here for follow up of pulmonary nodules.    Review of Systems    Objective:       Vitals:    06/05/19 0933   BP: 120/72   BP Location: Right arm   Patient Position: Sitting   Pulse: 68   SpO2: 97%   Weight: 96.4 kg (212 lb 8.4 oz)   Height: 6' (1.829 m)     Physical Exam  Personal Diagnostic Review  CT of chest performed on today as compared to previous 2/17/2019 without contrast revealed complete resolution of GGO.  Stable pulmonary nodules, the largest of which is 4mm.  No flowsheet data found.      Assessment:       1. Pulmonary nodules/lesions, multiple    2. Pulmonary nodule        Outpatient Encounter Medications as of 6/5/2019   Medication Sig Dispense Refill    aspirin (ECOTRIN) 81 MG EC tablet Take 81 mg by mouth once daily.      cyanocobalamin 2000 MCG tablet Take 5,000 mcg by mouth once daily.      hydrOXYzine pamoate (VISTARIL) 50 MG Cap Take 1 capsule (50 mg total) by mouth nightly. 90 capsule 3    losartan (COZAAR) 50 MG tablet Take 1 tablet (50 mg total) by mouth once daily. 90 tablet 3    nebivolol (BYSTOLIC) 10 MG Tab Take 1 tablet (10 mg total) by mouth once daily. 90 tablet 3    pantoprazole (PROTONIX) 40 MG tablet Take 1 tablet (40 mg total) by mouth 2 (two) times daily. 60 tablet 11    red yeast rice 600 mg Cap Take 600 mg by mouth.      saw palmetto 80 MG capsule Take 80 mg by mouth 2 (two) times daily.      vitamin D 1000 units Tab Take 2,000 mg by mouth once daily.       No facility-administered encounter medications on file as of 6/5/2019.      No orders of the defined types were placed in this encounter.    Plan:     Problem List Items Addressed This Visit      Pulmonary nodule    Overview     Less than 6 mm.  In low risk patient, no follow up is necessary.           Other Visit Diagnoses     Pulmonary nodules/lesions, multiple    -  Primary

## 2019-07-01 ENCOUNTER — HOSPITAL ENCOUNTER (OUTPATIENT)
Dept: RADIOLOGY | Facility: HOSPITAL | Age: 74
Discharge: HOME | End: 2019-07-01
Attending: INTERNAL MEDICINE
Payer: MEDICARE

## 2019-07-01 ENCOUNTER — OFFICE VISIT (OUTPATIENT)
Dept: INTERNAL MEDICINE | Facility: CLINIC | Age: 74
End: 2019-07-01
Payer: MEDICARE

## 2019-07-01 VITALS
RESPIRATION RATE: 11 BRPM | TEMPERATURE: 98 F | SYSTOLIC BLOOD PRESSURE: 98 MMHG | HEART RATE: 62 BPM | BODY MASS INDEX: 28.76 KG/M2 | WEIGHT: 212.31 LBS | OXYGEN SATURATION: 97 % | DIASTOLIC BLOOD PRESSURE: 62 MMHG | HEIGHT: 72 IN

## 2019-07-01 DIAGNOSIS — I10 ESSENTIAL HYPERTENSION: ICD-10-CM

## 2019-07-01 DIAGNOSIS — M54.5 BILATERAL LOW BACK PAIN, UNSPECIFIED CHRONICITY, WITH SCIATICA PRESENCE UNSPECIFIED: ICD-10-CM

## 2019-07-01 DIAGNOSIS — M54.5 BILATERAL LOW BACK PAIN, UNSPECIFIED CHRONICITY, WITH SCIATICA PRESENCE UNSPECIFIED: Primary | ICD-10-CM

## 2019-07-01 PROCEDURE — 99999 PR PBB SHADOW E&M-EST. PATIENT-LVL III: CPT | Mod: PBBFAC,,, | Performed by: INTERNAL MEDICINE

## 2019-07-01 PROCEDURE — 72100 X-RAY EXAM L-S SPINE 2/3 VWS: CPT | Mod: TC,PO

## 2019-07-01 PROCEDURE — 99999 PR PBB SHADOW E&M-EST. PATIENT-LVL III: ICD-10-PCS | Mod: PBBFAC,,, | Performed by: INTERNAL MEDICINE

## 2019-07-01 PROCEDURE — 3078F DIAST BP <80 MM HG: CPT | Mod: S$GLB,,, | Performed by: INTERNAL MEDICINE

## 2019-07-01 PROCEDURE — 1101F PT FALLS ASSESS-DOCD LE1/YR: CPT | Mod: S$GLB,,, | Performed by: INTERNAL MEDICINE

## 2019-07-01 PROCEDURE — 99213 OFFICE O/P EST LOW 20 MIN: CPT | Mod: S$GLB,,, | Performed by: INTERNAL MEDICINE

## 2019-07-01 PROCEDURE — 72100 XR LUMBAR SPINE AP AND LATERAL: ICD-10-PCS | Mod: 26,,, | Performed by: RADIOLOGY

## 2019-07-01 PROCEDURE — 3074F PR MOST RECENT SYSTOLIC BLOOD PRESSURE < 130 MM HG: ICD-10-PCS | Mod: S$GLB,,, | Performed by: INTERNAL MEDICINE

## 2019-07-01 PROCEDURE — 72100 X-RAY EXAM L-S SPINE 2/3 VWS: CPT | Mod: 26,,, | Performed by: RADIOLOGY

## 2019-07-01 PROCEDURE — 99213 PR OFFICE/OUTPT VISIT, EST, LEVL III, 20-29 MIN: ICD-10-PCS | Mod: S$GLB,,, | Performed by: INTERNAL MEDICINE

## 2019-07-01 PROCEDURE — 3074F SYST BP LT 130 MM HG: CPT | Mod: S$GLB,,, | Performed by: INTERNAL MEDICINE

## 2019-07-01 PROCEDURE — 3078F PR MOST RECENT DIASTOLIC BLOOD PRESSURE < 80 MM HG: ICD-10-PCS | Mod: S$GLB,,, | Performed by: INTERNAL MEDICINE

## 2019-07-01 PROCEDURE — 1101F PR PT FALLS ASSESS DOC 0-1 FALLS W/OUT INJ PAST YR: ICD-10-PCS | Mod: S$GLB,,, | Performed by: INTERNAL MEDICINE

## 2019-07-01 NOTE — PROGRESS NOTES
Subjective:       Patient ID: Maegan Cleary is a 73 y.o. male.    Chief Complaint: Low-back Pain (pt injured back while doing work at home, has been treating it with physical therapy, no results)    HPI   The patient presents with a 6 week history of lower back pain. The bilateral lumbar pain radiates into the gluteal areas and proximal thighs posteriorly.  He has been using ibuprofen regularly for pain relief.  He did experience transient relief of symptoms with a trial of prednisone for several days.  The symptoms however have returned.  Pain appears to be exacerbated by certain bending and extension movements of the spine.  He has been avoiding heavy lifting.  He denies having any prior episodes of lower back pain. There is no bowel or bladder sphincter dysfunction.  No lower extremity weakness or paresthesias are noted. He has not experienced any falls.  Prior to the onset of pain he had been engaged in heavy lifting while working around his home.  He has been performing stretching exercises as recommended by a friend who is a physical therapist.    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  He states that his weight has been stable.  Active medical conditions include hypertension.      Review of Systems   Constitutional: Negative for activity change, chills, fever and unexpected weight change.   Respiratory: Negative for shortness of breath.    Cardiovascular: Negative for chest pain.   Gastrointestinal: Negative for abdominal pain.   Genitourinary: Negative for dysuria and flank pain.   Musculoskeletal: Positive for back pain. Negative for joint swelling.   Skin: Negative for rash.   Neurological: Positive for weakness (Mild lower back weakness is noted on standing). Negative for numbness.       Objective:      Physical Exam   Constitutional: He is oriented to person, place, and time. He appears well-developed and well-nourished.   The patient is noting lower back pain.   HENT:   Head: Normocephalic and  atraumatic.   Eyes: Conjunctivae are normal. No scleral icterus.   Cardiovascular: Normal rate, regular rhythm, normal heart sounds and intact distal pulses.   Abdominal: Soft. He exhibits no distension and no mass. There is no tenderness.   Musculoskeletal:   Negative straight leg raising test bilaterally.  Hip range of motion is intact. No SI joint tenderness is present on palpation.  No sciatic notch tenderness is present.  Anterior flexion to 90°; some pain noted on extension.   Neurological: He is alert and oriented to person, place, and time. No sensory deficit. He exhibits normal muscle tone. Coordination normal.   Lower extremity strength is symmetrical.   Nursing note and vitals reviewed.        Assessment:       1. Bilateral low back pain, unspecified chronicity, with sciatica presence unspecified    2. Essential hypertension        Plan:       Maegan was seen today for low-back pain. Lumbar spine x-rays will be obtained today.  Laboratory studies will be obtained.  An MRI scan of lumbar spine will be obtained.  The patient should continue use of ibuprofen and topical analgesic patches such as Salon-Pas.    Diagnoses and all orders for this visit:    Bilateral low back pain, unspecified chronicity, with sciatica presence unspecified  -     MRI Lumbar Spine Without Contrast; Future  -     X-Ray Lumbar Spine Ap And Lateral; Future    Essential hypertension  -     CBC auto differential; Future  -     Comprehensive metabolic panel; Future

## 2019-07-02 ENCOUNTER — PATIENT MESSAGE (OUTPATIENT)
Dept: INTERNAL MEDICINE | Facility: CLINIC | Age: 74
End: 2019-07-02

## 2019-07-05 ENCOUNTER — HOSPITAL ENCOUNTER (OUTPATIENT)
Dept: RADIOLOGY | Facility: HOSPITAL | Age: 74
Discharge: HOME | End: 2019-07-05
Attending: INTERNAL MEDICINE
Payer: MEDICARE

## 2019-07-05 ENCOUNTER — TELEPHONE (OUTPATIENT)
Dept: INTERNAL MEDICINE | Facility: CLINIC | Age: 74
End: 2019-07-05

## 2019-07-05 DIAGNOSIS — M54.42 CHRONIC BILATERAL LOW BACK PAIN WITH BILATERAL SCIATICA: Primary | ICD-10-CM

## 2019-07-05 DIAGNOSIS — M51.36 LUMBAR DEGENERATIVE DISC DISEASE: ICD-10-CM

## 2019-07-05 DIAGNOSIS — G89.29 CHRONIC BILATERAL LOW BACK PAIN WITH BILATERAL SCIATICA: Primary | ICD-10-CM

## 2019-07-05 DIAGNOSIS — M54.16 LUMBAR RADICULOPATHY: ICD-10-CM

## 2019-07-05 DIAGNOSIS — M54.5 BILATERAL LOW BACK PAIN, UNSPECIFIED CHRONICITY, WITH SCIATICA PRESENCE UNSPECIFIED: ICD-10-CM

## 2019-07-05 DIAGNOSIS — M54.41 CHRONIC BILATERAL LOW BACK PAIN WITH BILATERAL SCIATICA: Primary | ICD-10-CM

## 2019-07-05 PROCEDURE — 72148 MRI LUMBAR SPINE W/O DYE: CPT | Mod: TC

## 2019-07-05 PROCEDURE — 72148 MRI LUMBAR SPINE W/O DYE: CPT | Mod: 26,,, | Performed by: RADIOLOGY

## 2019-07-05 PROCEDURE — 72148 MRI LUMBAR SPINE WITHOUT CONTRAST: ICD-10-PCS | Mod: 26,,, | Performed by: RADIOLOGY

## 2019-07-05 NOTE — TELEPHONE ENCOUNTER
MRI of lumbar spine showed evidence of degenerative disc disease of the lumbar spine.  Changes are most prominent at L5-S1.  Severe neuroforaminal narrowing was also noted. Treatment options have been discussed.  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

## 2019-07-12 ENCOUNTER — CLINICAL SUPPORT (OUTPATIENT)
Dept: REHABILITATION | Facility: HOSPITAL | Age: 74
End: 2019-07-12
Attending: INTERNAL MEDICINE
Payer: MEDICARE

## 2019-07-12 DIAGNOSIS — M53.86 DECREASED ROM OF LUMBAR SPINE: ICD-10-CM

## 2019-07-12 DIAGNOSIS — M54.50 ACUTE MIDLINE LOW BACK PAIN WITHOUT SCIATICA: ICD-10-CM

## 2019-07-12 PROCEDURE — G8979 MOBILITY GOAL STATUS: HCPCS | Mod: CJ,PN

## 2019-07-12 PROCEDURE — 97161 PT EVAL LOW COMPLEX 20 MIN: CPT | Mod: PN

## 2019-07-12 PROCEDURE — 97110 THERAPEUTIC EXERCISES: CPT | Mod: PN

## 2019-07-12 PROCEDURE — G8978 MOBILITY CURRENT STATUS: HCPCS | Mod: CK,PN

## 2019-07-12 NOTE — PLAN OF CARE
[facility]  Physical Therapy Initial Evaluation    Name: Maegan Cleary  Clinic Number: [id number]    Therapy Diagnosis:   Encounter Diagnoses   Name Primary?    Decreased ROM of lumbar spine     Acute midline low back pain without sciatica      Physician: Leandro Carr MD    Physician Orders: PT Eval and Treat   Medical Diagnosis from Referral:   M54.42,M54.41,G89.29 (ICD-10-CM) - Chronic bilateral low back pain with bilateral sciatica   M54.16 (ICD-10-CM) - Lumbar radiculopathy   M51.36 (ICD-10-CM) - Lumbar degenerative disc disease     Evaluation Date: 7/12/2019  Authorization Period Expiration: 12/31/2019  Plan of Care Expiration: 9/6/19  Visit # / Visits authorized: 1/12  FOTO: 1/5  GCODE: 1/10  PTA Visit: 0/6    Cap Visit: 110.13  Cap Total: 110.13    Time In: 10:10 am  Time Out: 11:00 am  Total Billable Time: 50 minutes (1 TE + 1 LCE)  Precautions: HTN, standard    Subjective   Date of onset: 6/2019  History of current condition - Maegan reports: he has spinal stenosis at L4-L5 and has been having low back pain for about 6 weeks now. He has been doing some exercises at home prescribed by his son (a PT), and has been using an ice pack to manage his pain that helps alot. Both exercises and modalities have been helping with pain. He reports he has no pain in sitting and walking helps with pain. Pain is mostly during transitions after sitting for long periods and getting out of bed int he morning that both get better with movement. Denies any numbness and tingling. 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 He would like to be able to have no pain and be able to play with his grandchildren without any back pain.     Medical History:   Past Medical History:   Diagnosis Date    Hyperlipidemia     Hypertension     Insomnia     CRIS (obstructive sleep apnea)     Vitamin D deficiency        Surgical History:   Maegan Cleary  has a past  surgical history that includes cataract surgery and Excision basal cell carcinoma.    Medications:   Maegan has a current medication list which includes the following prescription(s): aspirin, cyanocobalamin, hydroxyzine pamoate, losartan, nebivolol, pantoprazole, red yeast rice, saw palmetto, and vitamin d.    Allergies:   Review of patient's allergies indicates:  No Known Allergies     Imaging,   X-ray: 7/1/19  There is mild DJD.  No fracture dislocation bone destruction seen.    MRI: 7/1/19  Lumbar spine vertebral body heights and alignment are maintained.  Incidental vertebral hemangiomas at L2 and S1.  No suspicious marrow enhancing lesion.  The spinal cord terminus lies L1-L2.  There are several, partially visualized T2 hyperintense renal lesions, probable cysts.  No abdominal aortic aneurysm.  T12-L1: No significant posterior disc herniation, spinal canal stenosis, or neural foraminal impingement.  L1-L2: Minimal bulge with flavum thickening without significant central canal stenosis or neural foraminal impingement.  L2-L3: Mild flavum buckling with minimal bulge without significant central canal stenosis or neural foraminal impingement.  L3-L4: Minimal bulge with flavum thickening and facet arthrosis resulting in very mild central canal narrowing.  Mild right without significant left neural foraminal narrowing.  L4-L5: Mild circumferential bulge with flavum thickening and facet arthropathy contributing to moderately severe central stenosis and lateral recess narrowing.  Mild bilateral neural foraminal narrowing, slightly worse on the right.  L5-S1: There is posterior annular fissure.  Minimal bulge, flavum thickening, and advanced bilateral facet DJD noting effusion on the right.  Associated lateral recess narrowing without significant central canal stenosis.  No significant neural foraminal impingement.    Prior Therapy: None  Social History: [employer]  Occupation: working 3x a week as doctor, mainly sitting desk  work  Prior Level of Function: independent playing basketball and transitions without any low back pain  Current Level of Function: low back pain with transitions especially after prolonged sitting and supine exercises  Pts goals: to not have any pain in back or legs    Pain:  Current 0/10, worst 7/10, best 0/10   Location: R>L low back pain and aching  Description: Aching and Burning  Aggravating Factors: Morning and Getting out of bed/chair  Easing Factors: pain medication, ice and rest    Objective     Gait: WNL  Posture: min decreased lumbar lordosis  Sensation: light touch intact  Palpation: no TTP  Joint mobility: decreased lumbar extension and flexion  Flexibility: decreased B HS length    A/PROM and MMT:  * = right side of low back pain with testing  AROM: LUMBAR   Flexion 80%, HS tightness   Extension 100%   Right side bending 100%*   Left side bending 100%   Right rotation 100%*   Left rotation 100%     Hip  Right   Left  Pain/Dysfunction with Movement    AROM PROM MMT AROM PROM MMT    Flexion WFL WFL 4-/5* WFL WFL 4-/5    Extension WFL WFL 4-/5 WFL WFL 4-/5    Abduction WFL WFL 3+/5 WFL WFL 3+/5    Adduction WFL WFL 5/5 WFL WFL 5/5    Internal rotation WFL WFL 5/5 WFL WFL 5/5    External rotation WFL WFL 4/5 WFL WFL 4/5       Knee  Right   Left  Pain/Dysfunction with Movement    AROM PROM MMT AROM PROM MMT    Flexion WFL WFL 4+/5 WFL WFL 4+/5    Extension WFL WFL 5/5 WFL WFL 5/5      Ankle  Right   Left  Pain/Dysfunction with Movement    AROM PROM MMT AROM PROM MMT    Plantarflexion WFL WFL 5/5 WFL WFL 5/5    Dorsiflexion WFL WFL 5/5 WFL WFL 5/5      Lumbar Tests:  Slump test = negative B  Quadrant test = negative B  SLR Test = negative B  SL Bridge Test (glut med strength) = not tested  Ely's test = negative B, R>L quad tightness  Prone Instability Test = not tested  Pancho Test = no tested   J-Sign= not tested  HS Length 90-90 test = positive B    CMS Impairment/Limitation/Restriction for FOTO Lumbar  Spine Survey    Therapist reviewed FOTO scores for Maegan Imsais on 7/12/2019.   FOTO documents entered into EPIC - see Media section.    Limitation Score: 50%  Category: Mobility  Predicted: 34%    Current : CK = at least 40% but < 60% impaired, limited or restricted  Goal: CJ = at least 20% but < 40% impaired, limited or restricted     TREATMENT   Treatment Time In: 10:50  Treatment Time Out: 11:00  Total Treatment time separate from Evaluation: 10 minutes    Maegan received therapeutic exercises to develop strength, endurance, ROM, flexibility, posture and core stabilization for 10 minutes including:    Sciatic nerve glides: 10x B  SKTC stretch: 1x30'' B  Bridges: 10x  SL hip abd: 10x B    Next:  TAs c/ hip extension  DKTC c/ SB  SL thoracic rotations  Leg press    Home Exercises and Patient Education Provided:  Education provided:   - abdominal bracing, lifting and carrying body mechanics, avoid activities that increase concordant pain  - course of therapy, prognosis    Written Home Exercises Provided: yes.  Exercises were reviewed and Maegan was able to demonstrate them prior to the end of the session.  Maegan demonstrated good  understanding of the education provided.     See EMR under Media for exercises provided 7/12/2019.    Assessment   Maegan is a 74 y.o. male referred to outpatient Physical Therapy at [facility] with a medical diagnosis of Chronic bilateral low back pain with bilateral sciatica, Lumbar radiculopathy, and Lumbar DDD. Pt currently presents with B side low back pain (currently does not appear to have any radicular symptoms), decreased lumbar ROM, decreased BLE strength, impaired posture, and functional deficits with lower back pain during transitions. Symptoms of radiculopathy will be monitored though was currently negative for neurological testing. Pt would benefit from skilled PT consisting of gait training, muscular skeletal stretching/strengthening, manual therapy, neuro muscular  re-education, and modalities prn to address limitations and increase functional mobility.    Pt prognosis is Excellent.   Pt will benefit from skilled outpatient Physical Therapy to address the deficits stated above and in the chart below, provide pt/family education, and to maximize pt's level of independence.     Plan of care discussed with patient: Yes  Pt's spiritual, cultural and educational needs considered and patient is agreeable to the plan of care and goals as stated below:     Anticipated Barriers for therapy: work schedule    Medical Necessity is demonstrated by the following  History  Co-morbidities and personal factors that may impact the plan of care Co-morbidities:   HTN    Personal Factors:   no deficits     moderate   Examination  Body Structures and Functions, activity limitations and participation restrictions that may impact the plan of care Body Regions:   back  lower extremities  trunk    Body Systems:    gross symmetry  ROM  strength  gross coordinated movement  balance  gait  transfers  transitions  motor control    Participation Restrictions:   Playing basketball    Activity limitations:   Learning and applying knowledge  no deficits    General Tasks and Commands  no deficits    Communication  no deficits    Mobility  no deficits    Self care  no deficits    Domestic Life  no deficits    Interactions/Relationships  no deficits    Life Areas  no deficits    Community and Social Life  no deficits         high   Clinical Presentation stable and uncomplicated low   Decision Making/ Complexity Score: low     GOALS: Short Term Goals: 4 weeks  1. Pt will demo good TA muscle contraction for improved deep abdominal strength and lumbar stability.  2. Increase lumbar ROM to WNL loss in order to improve functional mobility.    3. Pt will demo good sitting/standing posture and body mechanics for improved spine health and decreased risk of future injury.  4. Pt to tolerate HEP to improve ROM and  independence with ADL's.    Long Term Goals: 8 weeks  1. Report decreased low back pain without radiculopathy to </= 0-1/10  to increase tolerance for ADLs and increased QoL.  2. Increase strength to >/= 4/5 MMT grade for core and BLE to increase tolerance for ADL and work activities.  3. Pt to demonstrate negative SLR and/or Slump Test in order to show improved core strength and decreased nerve/dural tension.  4. Patient's goal: to not have any pain in back or legs  5. Pt will report at CJ level (20-40% impaired) on FOTO lumbar score for low back pain disability to demonstrate decrease in disability and improvement in back pain.    Plan   Plan of care Certification: 7/12/2019 to 9/6/19.    Outpatient Physical Therapy 2 times weekly for 8 weeks to include the following interventions: Aquatic Therapy, Cervical/Lumbar Traction, Electrical Stimulation IFC/Russian, Gait Training, Manual Therapy, Moist Heat/ Ice, Neuromuscular Re-ed, Orthotic Management and Training, Patient Education, Self Care, Therapeutic Activites and Therapeutic Exercise.     Rosalio Barros, PT

## 2019-07-26 ENCOUNTER — CLINICAL SUPPORT (OUTPATIENT)
Dept: REHABILITATION | Facility: HOSPITAL | Age: 74
End: 2019-07-26
Attending: INTERNAL MEDICINE
Payer: MEDICARE

## 2019-07-26 DIAGNOSIS — M54.50 ACUTE MIDLINE LOW BACK PAIN WITHOUT SCIATICA: ICD-10-CM

## 2019-07-26 DIAGNOSIS — M53.86 DECREASED ROM OF LUMBAR SPINE: ICD-10-CM

## 2019-07-26 PROCEDURE — 97110 THERAPEUTIC EXERCISES: CPT | Mod: PN

## 2019-07-26 PROCEDURE — 97140 MANUAL THERAPY 1/> REGIONS: CPT | Mod: PN

## 2019-07-26 NOTE — PROGRESS NOTES
"  Physical Therapy Daily Treatment Note     Name: Maegan [facility]  Clinic Number: [id number]    Therapy Diagnosis:   Encounter Diagnoses   Name Primary?    Decreased ROM of lumbar spine     Acute midline low back pain without sciatica      Physician: Leandro Carr MD    Visit Date: 7/26/2019    Physician Orders: PT Eval and Treat   Medical Diagnosis from Referral:   M54.42,M54.41,G89.29 (ICD-10-CM) - Chronic bilateral low back pain with bilateral sciatica   M54.16 (ICD-10-CM) - Lumbar radiculopathy   M51.36 (ICD-10-CM) - Lumbar degenerative disc disease      Evaluation Date: 7/12/2019  Authorization Period Expiration: 12/31/2019  Plan of Care Expiration: 9/6/19  Visit # / Visits authorized: 2/12  FOTO: 2/5  GCODE: 2/10  PTA Visit: 1/6     Cap Visit: 88.1  Cap Total: 198.23      Time In: 8:00 AM   Time Out: 8:50  Total Billable Time: 40 minutes 2TE, 2 MT    Precautions: HTN, standard    Subjective     Pt reports: pt states he has small "Lumps of tissue" to B mid lower back. " they go away when I rub them down". Pt agreeable to PT session. .  He was compliant with home exercise program.  Response to previous treatment: evaluation last session   Functional change: none reported by pt    Pain: 4/10  Location: bilateral back      Objective     Maegan received therapeutic exercises to develop strength, endurance, ROM, flexibility, posture and core stabilization for 20 minutes including:     -Sciatic nerve glides: 10x2 B  -SKTC stretch: 2x30'' B  -Bridges: 10x2  -SL hip abd: 10x B     -TAs c/ hip extension 2x10  -DKTC c/ SB   2x12 green physioball.   -SL thoracic rotations 2x10   -Leg press NEXT    Maegan received the following manual therapy techniques: Joint mobilizations, Myofacial release and Soft tissue Mobilization were applied to the: lower back for 20 minutes, including:  -STM B lumbar paraspinals alternate sidelying  -STM B quadratus Lumborum  -STM B gluteal musculature     Pt received ICE PACK to mid lower back " in hooklying x 10 min.   Home Exercises Provided and Patient Education Provided     Education provided:   - cont HEP    Written Home Exercises Provided: yes.  Exercises were reviewed and Maegan was able to demonstrate them prior to the end of the session.  Maegan demonstrated good  understanding of the education provided.     See EMR under Patient Instructions for exercises provided prior visit.    Assessment     Pt completed session with reports of decreased mid lower back pain. He was able to complete therx with no adverse reactions. He follows verbal instructions on technique with no difficulty.   Maegan is progressing well towards his goals.   Pt prognosis is Good.     Pt will continue to benefit from skilled outpatient physical therapy to address the deficits listed in the problem list box on initial evaluation, provide pt/family education and to maximize pt's level of independence in the home and community environment.     Pt's spiritual, cultural and educational needs considered and pt agreeable to plan of care and goals.     Anticipated barriers to physical therapy: see precautions.     GOALS: Short Term Goals: 4 weeks  1. Pt will demo good TA muscle contraction for improved deep abdominal strength and lumbar stability. (Ongoing, not met)  2. Increase lumbar ROM to WNL loss in order to improve functional mobility. (Ongoing, not met)   3. Pt will demo good sitting/standing posture and body mechanics for improved spine health and decreased risk of future injury.(Ongoing, not met)  4. Pt to tolerate HEP to improve ROM and independence with ADL's.(Ongoing, not met)     Long Term Goals: 8 weeks  1. Report decreased low back pain without radiculopathy to </= 0-1/10  to increase tolerance for ADLs and increased QoL.(Ongoing, not met)  2. Increase strength to >/= 4/5 MMT grade for core and BLE to increase tolerance for ADL and work activities.(Ongoing, not met)  3. Pt to demonstrate negative SLR and/or Slump Test in  order to show improved core strength and decreased nerve/dural tension.(Ongoing, not met)  4. Patient's goal: to not have any pain in back or legs(Ongoing, not met)  5. Pt will report at CJ level (20-40% impaired) on FOTO lumbar score for low back pain disability to demonstrate decrease in disability and improvement in back pain(Ongoing, not met)    Plan     Cont PT as per POC, advance PT as tolerated, monitor response to session.     John Irby, PTA

## 2019-08-02 ENCOUNTER — CLINICAL SUPPORT (OUTPATIENT)
Dept: REHABILITATION | Facility: HOSPITAL | Age: 74
End: 2019-08-02
Attending: INTERNAL MEDICINE
Payer: MEDICARE

## 2019-08-02 DIAGNOSIS — M53.86 DECREASED ROM OF LUMBAR SPINE: ICD-10-CM

## 2019-08-02 DIAGNOSIS — M54.50 ACUTE MIDLINE LOW BACK PAIN WITHOUT SCIATICA: ICD-10-CM

## 2019-08-02 PROCEDURE — 97110 THERAPEUTIC EXERCISES: CPT | Mod: PN

## 2019-08-02 NOTE — PROGRESS NOTES
Physical Therapy Daily Treatment Note     Name: Maegan [facility]  Clinic Number: [id number]    Therapy Diagnosis:   Encounter Diagnoses   Name Primary?    Decreased ROM of lumbar spine     Acute midline low back pain without sciatica      Physician: Leandro Carr MD    Visit Date: 8/2/2019    Physician Orders: PT Eval and Treat   Medical Diagnosis from Referral:   M54.42,M54.41,G89.29 (ICD-10-CM) - Chronic bilateral low back pain with bilateral sciatica   M54.16 (ICD-10-CM) - Lumbar radiculopathy   M51.36 (ICD-10-CM) - Lumbar degenerative disc disease      Evaluation Date: 7/12/2019  Authorization Period Expiration: 12/31/2019  Plan of Care Expiration: 9/6/19  Visit # / Visits authorized: 3/12  FOTO: 3/5  GCODE: 3/10  PTA Visit: 0/6     Cap Visit: 60.64  Cap Total: 258.87    Time In: 2:00  Time Out: 3:00  Total Billable Time: 30 minutes 2TE    Precautions: HTN, standard    Subjective     Pt reports: very little to no low back pain and doing well  He was compliant with home exercise program.  Response to previous treatment: evaluation last session   Functional change: none reported by pt    Pain: 2/10  Location: bilateral back      Objective     Maegan received therapeutic exercises to develop strength, endurance, ROM, flexibility, posture and core stabilization for 30 minutes supervised and 30 minutes 1:1 with PT including:     -Sciatic nerve glides: 20x B  -Bridges: 2x15 3'' holds c/ hip add  -SL hip abd: 2x10 B  -TAs c/ hip extension: 2x10 B  -DKTC c/ SB   2x15 c/ green physioball.   -dead bug: next  -SL thoracic rotations 2x10 B  -Standing air squats: 3x10, cues to decrease depth  -Palloff press: next c/ BTB  -Seated LAQs on SB: next   -Seated marching on SB: next  -Leg press: 2x10 DL 7 plates    Maegan received the following manual therapy techniques: Joint mobilizations, Myofacial release and Soft tissue Mobilization were applied to the: lower back for 0 minutes, including: Not performed today  -STM B lumbar  paraspinals alternate sidelying  -STM B quadratus Lumborum  -STM B gluteal musculature     Home Exercises Provided and Patient Education Provided   Education provided:   - cont HEP    Written Home Exercises Provided: yes.  Exercises were reviewed and Maegan was able to demonstrate them prior to the end of the session.  Maegan demonstrated good  understanding of the education provided.     See EMR under Patient Instructions for exercises provided prior visit.    Assessment     Pt did very well today and had to be cued to not overdo exercises and stick with prescribed sets/reps. Progress standing exercises as noted above.  Maegan is progressing well towards his goals.   Pt prognosis is Good.     Pt will continue to benefit from skilled outpatient physical therapy to address the deficits listed in the problem list box on initial evaluation, provide pt/family education and to maximize pt's level of independence in the home and community environment.   Pt's spiritual, cultural and educational needs considered and pt agreeable to plan of care and goals.     Anticipated barriers to physical therapy: see precautions.     GOALS: Short Term Goals: 4 weeks  1. Pt will demo good TA muscle contraction for improved deep abdominal strength and lumbar stability. (Ongoing, not met)  2. Increase lumbar ROM to WNL loss in order to improve functional mobility. (Ongoing, not met)   3. Pt will demo good sitting/standing posture and body mechanics for improved spine health and decreased risk of future injury.(Ongoing, not met)  4. Pt to tolerate HEP to improve ROM and independence with ADL's.(Ongoing, not met)     Long Term Goals: 8 weeks  1. Report decreased low back pain without radiculopathy to </= 0-1/10  to increase tolerance for ADLs and increased QoL.(Ongoing, not met)  2. Increase strength to >/= 4/5 MMT grade for core and BLE to increase tolerance for ADL and work activities.(Ongoing, not met)  3. Pt to demonstrate negative SLR  and/or Slump Test in order to show improved core strength and decreased nerve/dural tension.(Ongoing, not met)  4. Patient's goal: to not have any pain in back or legs(Ongoing, not met)  5. Pt will report at CJ level (20-40% impaired) on FOTO lumbar score for low back pain disability to demonstrate decrease in disability and improvement in back pain(Ongoing, not met)    Plan     Cont PT as per POC, advance PT as tolerated, monitor response to session.     Rosalio Barros, PT

## 2019-08-05 ENCOUNTER — CLINICAL SUPPORT (OUTPATIENT)
Dept: REHABILITATION | Facility: HOSPITAL | Age: 74
End: 2019-08-05
Attending: INTERNAL MEDICINE
Payer: MEDICARE

## 2019-08-05 DIAGNOSIS — M54.50 ACUTE MIDLINE LOW BACK PAIN WITHOUT SCIATICA: ICD-10-CM

## 2019-08-05 DIAGNOSIS — M53.86 DECREASED ROM OF LUMBAR SPINE: ICD-10-CM

## 2019-08-05 PROCEDURE — 97110 THERAPEUTIC EXERCISES: CPT | Mod: PN

## 2019-08-05 NOTE — PROGRESS NOTES
Physical Therapy Daily Treatment Note     Name: Maegan [facility]  Clinic Number: [id number]    Therapy Diagnosis:   Encounter Diagnoses   Name Primary?    Decreased ROM of lumbar spine     Acute midline low back pain without sciatica      Physician: Leandro Carr MD    Visit Date: 8/5/2019    Physician Orders: PT Eval and Treat   Medical Diagnosis from Referral:   M54.42,M54.41,G89.29 (ICD-10-CM) - Chronic bilateral low back pain with bilateral sciatica   M54.16 (ICD-10-CM) - Lumbar radiculopathy   M51.36 (ICD-10-CM) - Lumbar degenerative disc disease      Evaluation Date: 7/12/2019  Authorization Period Expiration: 12/31/2019  Plan of Care Expiration: 9/6/19  Visit # / Visits authorized: 5/12  FOTO: 5/5 Next  GCODE: 5/10  PTA Visit: 0/6    Cap Visit: 121.28  Cap Total: 380.15    Time In: 1:00  Time Out: 2:00  Total Billable Time: 50 minutes 3 TE    Precautions: HTN, standard    Subjective     Pt reports: back pain is only present during the morning at about a 6/10 but thinks it is getting better overall. Pt is very tired and was entertaining some guests last night and was not able to take a nap at work today.  He was compliant with home exercise program.  Response to previous treatment: evaluation last session   Functional change: none reported by pt    Pain: 0/10  Location: bilateral back      Objective     Maegan received therapeutic exercises to develop strength, endurance, ROM, flexibility, posture and core stabilization for 50 minutes 1:1 with PT including:     -Sciatic nerve glides: 20x B  -LTRs c/ SB: 3'  -Bridges: 2x15 3'' holds c/ hip add  -SL hip abd: 2x10 B 1.5#  -SLR: 2x10 B 1.5#  -TAs c/ hip extension: 2x10 B  -DKTC c/ SB: 2x15 c/ green physioball  -dead bug: 10x B, mod cueing for coordination and technique  -SL Lumbar rotations 2x10 B  -Standing air squats: 3x10, cues to decrease depth  -Palloff press: next c/ BTB  -Seated LAQs on SB: next   -Seated marching on SB: next  -Leg press: 3x10 DL 7  plates    Maegan received the following manual therapy techniques: Joint mobilizations, Myofacial release and Soft tissue Mobilization were applied to the: lower back for 0 minutes, including: Not performed today  -STM B lumbar paraspinals alternate sidelying  -STM B quadratus Lumborum  -STM B gluteal musculature     Home Exercises Provided and Patient Education Provided   Education provided:   - cont HEP    Written Home Exercises Provided: yes.  Exercises were reviewed and Maegan was able to demonstrate them prior to the end of the session.  Maegan demonstrated good  understanding of the education provided.     See EMR under Patient Instructions for exercises provided prior visit.    Assessment     Progress core activities next visit; pt required mod cueing for added exercises and to decrease speed to focus on quality as well.  Maegan is progressing well towards his goals.   Pt prognosis is Good.     Pt will continue to benefit from skilled outpatient physical therapy to address the deficits listed in the problem list box on initial evaluation, provide pt/family education and to maximize pt's level of independence in the home and community environment.   Pt's spiritual, cultural and educational needs considered and pt agreeable to plan of care and goals.     Anticipated barriers to physical therapy: see precautions.     GOALS: Short Term Goals: 4 weeks  1. Pt will demo good TA muscle contraction for improved deep abdominal strength and lumbar stability. (Ongoing, not met)  2. Increase lumbar ROM to WNL loss in order to improve functional mobility. (Ongoing, not met)   3. Pt will demo good sitting/standing posture and body mechanics for improved spine health and decreased risk of future injury.(Ongoing, not met)  4. Pt to tolerate HEP to improve ROM and independence with ADL's.(Ongoing, not met)     Long Term Goals: 8 weeks  1. Report decreased low back pain without radiculopathy to </= 0-1/10  to increase tolerance  for ADLs and increased QoL.(Ongoing, not met)  2. Increase strength to >/= 4/5 MMT grade for core and BLE to increase tolerance for ADL and work activities.(Ongoing, not met)  3. Pt to demonstrate negative SLR and/or Slump Test in order to show improved core strength and decreased nerve/dural tension.(Ongoing, not met)  4. Patient's goal: to not have any pain in back or legs(Ongoing, not met)  5. Pt will report at CJ level (20-40% impaired) on FOTO lumbar score for low back pain disability to demonstrate decrease in disability and improvement in back pain(Ongoing, not met)    Plan     Cont PT as per POC, advance PT as tolerated, monitor response to session.     Rosalio Barros, PT

## 2019-08-09 ENCOUNTER — CLINICAL SUPPORT (OUTPATIENT)
Dept: REHABILITATION | Facility: HOSPITAL | Age: 74
End: 2019-08-09
Attending: INTERNAL MEDICINE
Payer: MEDICARE

## 2019-08-09 DIAGNOSIS — M53.86 DECREASED ROM OF LUMBAR SPINE: ICD-10-CM

## 2019-08-09 DIAGNOSIS — M54.50 ACUTE MIDLINE LOW BACK PAIN WITHOUT SCIATICA: ICD-10-CM

## 2019-08-09 PROCEDURE — 97110 THERAPEUTIC EXERCISES: CPT | Mod: PN

## 2019-08-09 PROCEDURE — 97140 MANUAL THERAPY 1/> REGIONS: CPT | Mod: PN

## 2019-08-09 NOTE — PROGRESS NOTES
Physical Therapy Daily Treatment Note     Name: Maegan [facility]  Clinic Number: [id number]    Therapy Diagnosis:   Encounter Diagnoses   Name Primary?    Decreased ROM of lumbar spine     Acute midline low back pain without sciatica      Physician: Leandro Carr MD    Visit Date: 8/9/2019    Physician Orders: PT Eval and Treat   Medical Diagnosis from Referral:   M54.42,M54.41,G89.29 (ICD-10-CM) - Chronic bilateral low back pain with bilateral sciatica   M54.16 (ICD-10-CM) - Lumbar radiculopathy   M51.36 (ICD-10-CM) - Lumbar degenerative disc disease      Evaluation Date: 7/12/2019  Authorization Period Expiration: 12/31/2019  Plan of Care Expiration: 9/6/19  Visit # / Visits authorized: 6/12  FOTO: 6/5 Next  GCODE: 6/10  PTA Visit: 0/6    Cap Visit: 60.64  Cap Total: 440.94    Time In: 8:00  Time Out: 8:53  Total Billable Time: 23 minutes 2 TE    Precautions: HTN, standard    Subjective     Pt reports: back pain present since it is the early morning  He was compliant with home exercise program.  Response to previous treatment: evaluation last session   Functional change: none reported by pt    Pain: 0/10  Location: bilateral back      Objective     Maegan received therapeutic exercises to develop strength, endurance, ROM, flexibility, posture and core stabilization for 13  minutes 1:1 with PT and 30 mins supervised including:     -Sciatic nerve glides: 20x B  -LTRs c/ SB: 3'  -Bridges: 2x15 3'' holds c/ hip add  -SL hip abd: 2x10 B 1.5#  -SLR: 2x10 B 1.5#  -TAs c/ hip extension: 2x10 B  -DKTC c/ SB: 2x15 c/ green physioball  -dead bug: 10x B, mod cueing for coordination and technique  -SL Lumbar rotations 2x10 B  -Iso abds in all direction: 20x each way  -Standing air squats: 3x10, cues to decrease depth  -Palloff press: next c/ BTB  -Seated marching on SB: 2x10 B on red SB  -Leg press: 3x10 DL 7 plates    Maegan received the following manual therapy techniques: Joint mobilizations, Myofacial release and Soft  tissue Mobilization were applied to the: lower back for 10 minutes, including:   Long axis lumbar traction to each leg separately    Home Exercises Provided and Patient Education Provided   Education provided:   - cont HEP    Written Home Exercises Provided: yes.  Exercises were reviewed and Maegan was able to demonstrate them prior to the end of the session.  Maegan demonstrated good  understanding of the education provided.     See EMR under Patient Instructions for exercises provided prior visit.    Assessment     Pt did well with added exercises and decreased low back pain after session. LBP 2/2 spondylosis.  Maegan is progressing well towards his goals.   Pt prognosis is Good.     Pt will continue to benefit from skilled outpatient physical therapy to address the deficits listed in the problem list box on initial evaluation, provide pt/family education and to maximize pt's level of independence in the home and community environment.   Pt's spiritual, cultural and educational needs considered and pt agreeable to plan of care and goals.     Anticipated barriers to physical therapy: see precautions.     GOALS: Short Term Goals: 4 weeks  1. Pt will demo good TA muscle contraction for improved deep abdominal strength and lumbar stability. (Ongoing, not met)  2. Increase lumbar ROM to WNL loss in order to improve functional mobility. (Ongoing, not met)   3. Pt will demo good sitting/standing posture and body mechanics for improved spine health and decreased risk of future injury.(Ongoing, not met)  4. Pt to tolerate HEP to improve ROM and independence with ADL's.(Ongoing, not met)     Long Term Goals: 8 weeks  1. Report decreased low back pain without radiculopathy to </= 0-1/10  to increase tolerance for ADLs and increased QoL.(Ongoing, not met)  2. Increase strength to >/= 4/5 MMT grade for core and BLE to increase tolerance for ADL and work activities.(Ongoing, not met)  3. Pt to demonstrate negative SLR and/or  Slump Test in order to show improved core strength and decreased nerve/dural tension.(Ongoing, not met)  4. Patient's goal: to not have any pain in back or legs(Ongoing, not met)  5. Pt will report at CJ level (20-40% impaired) on FOTO lumbar score for low back pain disability to demonstrate decrease in disability and improvement in back pain(Ongoing, not met)    Plan     Cont PT as per POC, advance PT as tolerated, monitor response to session.     Rosalio Barros, PT

## 2019-08-12 ENCOUNTER — CLINICAL SUPPORT (OUTPATIENT)
Dept: REHABILITATION | Facility: HOSPITAL | Age: 74
End: 2019-08-12
Attending: INTERNAL MEDICINE
Payer: MEDICARE

## 2019-08-12 DIAGNOSIS — M53.86 DECREASED ROM OF LUMBAR SPINE: ICD-10-CM

## 2019-08-12 DIAGNOSIS — M54.50 ACUTE MIDLINE LOW BACK PAIN WITHOUT SCIATICA: ICD-10-CM

## 2019-08-12 PROCEDURE — 97110 THERAPEUTIC EXERCISES: CPT | Mod: PN

## 2019-08-12 NOTE — PROGRESS NOTES
Physical Therapy Daily Treatment Note     Name: Maegan [facility]  Clinic Number: [id number]    Therapy Diagnosis:   Encounter Diagnoses   Name Primary?    Decreased ROM of lumbar spine     Acute midline low back pain without sciatica      Physician: Leandro Carr MD    Visit Date: 8/12/2019    Physician Orders: PT Eval and Treat   Medical Diagnosis from Referral:   M54.42,M54.41,G89.29 (ICD-10-CM) - Chronic bilateral low back pain with bilateral sciatica   M54.16 (ICD-10-CM) - Lumbar radiculopathy   M51.36 (ICD-10-CM) - Lumbar degenerative disc disease      Evaluation Date: 7/12/2019  Authorization Period Expiration: 12/31/2019  Plan of Care Expiration: 9/6/19  Visit # / Visits authorized: 7/12  FOTO: 7/5 DONE  GCODE: 7/10  PTA Visit: 0/6    Cap Visit: 121.28  Cap Total: 562.21    Time In: 1:05  Time Out: 2:00  Total Billable Time: 55 minutes (4 TE)    Precautions: HTN, standard    Subjective     Pt reports: he went to the gym and did some of his home exercises today. He states he did the inversion table (not fully inverted) for 10 minutes and gave him relief in his back.  He was compliant with home exercise program.  Response to previous treatment: evaluation last session   Functional change: none reported by pt    Pain: 0/10  Location: bilateral back      Objective     Maegan received therapeutic exercises to develop strength, endurance, ROM, flexibility, posture and core stabilization for 55 minutes 1:1 with PT including:     -Sciatic nerve glides: 20x B - not performed today  -Bridges on SB: 2x10 DL  -TAs c/ hip extension: 15x B 1.5#  -DKTC c/ SB: 2x15 c/ green physioball  -SL Lumbar rotations 2x10 B - not performed today  -Iso abds in all direction: 20x each way c/ SB  -Standing air squats: 3x10, cues to decrease depth - not performed today  -Palloff press: next c/ BTB  -Seated marching on SB: 2x10 B on red SB  -Leg press: 3x10 DL 7.5 plates, 2x10 SL B 4.5 plates  -Standing lumbar extensions: 20x  slowly    Maegan received the following manual therapy techniques: Joint mobilizations, Myofacial release and Soft tissue Mobilization were applied to the: lower back for 0 minutes, including: Not performed today  Long axis lumbar traction to each leg separately    Home Exercises Provided and Patient Education Provided   Education provided:   - cont HEP    Written Home Exercises Provided: yes.  Exercises were reviewed and Maegan was able to demonstrate them prior to the end of the session.  Maegan demonstrated good  understanding of the education provided.     See EMR under Patient Instructions for exercises provided prior visit.    Assessment     Improved FOTO score though pt reports 5/10 R>L low back pain at end of session that is decreased from 7/10. Pt hip level and leg length equal after pelvic MET for right anterior innominate, re-check next visit.  Maegan is progressing well towards his goals.   Pt prognosis is Good.     Pt will continue to benefit from skilled outpatient physical therapy to address the deficits listed in the problem list box on initial evaluation, provide pt/family education and to maximize pt's level of independence in the home and community environment.   Pt's spiritual, cultural and educational needs considered and pt agreeable to plan of care and goals.     Anticipated barriers to physical therapy: see precautions.     GOALS: Short Term Goals: 4 weeks  1. Pt will demo good TA muscle contraction for improved deep abdominal strength and lumbar stability. (Ongoing, not met)  2. Increase lumbar ROM to WNL loss in order to improve functional mobility. (Ongoing, not met)   3. Pt will demo good sitting/standing posture and body mechanics for improved spine health and decreased risk of future injury.(Ongoing, not met)  4. Pt to tolerate HEP to improve ROM and independence with ADL's.(Ongoing, not met)     Long Term Goals: 8 weeks  1. Report decreased low back pain without radiculopathy to </=  0-1/10  to increase tolerance for ADLs and increased QoL.(Ongoing, not met)  2. Increase strength to >/= 4/5 MMT grade for core and BLE to increase tolerance for ADL and work activities.(Ongoing, not met)  3. Pt to demonstrate negative SLR and/or Slump Test in order to show improved core strength and decreased nerve/dural tension.(Ongoing, not met)  4. Patient's goal: to not have any pain in back or legs(Ongoing, not met)  5. Pt will report at CJ level (20-40% impaired) on FOTO lumbar score for low back pain disability to demonstrate decrease in disability and improvement in back pain(Ongoing, not met)    Plan     Cont PT as per POC, advance PT as tolerated, monitor response to session.     Rosalio Barros, PT

## 2019-08-16 ENCOUNTER — CLINICAL SUPPORT (OUTPATIENT)
Dept: REHABILITATION | Facility: HOSPITAL | Age: 74
End: 2019-08-16
Attending: INTERNAL MEDICINE
Payer: MEDICARE

## 2019-08-16 ENCOUNTER — OFFICE VISIT (OUTPATIENT)
Dept: FAMILY MEDICINE | Facility: CLINIC | Age: 74
End: 2019-08-16
Payer: MEDICARE

## 2019-08-16 VITALS
HEIGHT: 72 IN | BODY MASS INDEX: 27.95 KG/M2 | SYSTOLIC BLOOD PRESSURE: 124 MMHG | DIASTOLIC BLOOD PRESSURE: 74 MMHG | WEIGHT: 206.38 LBS | OXYGEN SATURATION: 96 % | TEMPERATURE: 98 F | HEART RATE: 66 BPM

## 2019-08-16 DIAGNOSIS — M54.50 ACUTE MIDLINE LOW BACK PAIN WITHOUT SCIATICA: ICD-10-CM

## 2019-08-16 DIAGNOSIS — M48.061 SPINAL STENOSIS OF LUMBAR REGION WITHOUT NEUROGENIC CLAUDICATION: Primary | ICD-10-CM

## 2019-08-16 DIAGNOSIS — M53.86 DECREASED ROM OF LUMBAR SPINE: ICD-10-CM

## 2019-08-16 PROCEDURE — 3078F DIAST BP <80 MM HG: CPT | Mod: S$GLB,,, | Performed by: FAMILY MEDICINE

## 2019-08-16 PROCEDURE — 99214 PR OFFICE/OUTPT VISIT, EST, LEVL IV, 30-39 MIN: ICD-10-PCS | Mod: S$GLB,,, | Performed by: FAMILY MEDICINE

## 2019-08-16 PROCEDURE — 3078F PR MOST RECENT DIASTOLIC BLOOD PRESSURE < 80 MM HG: ICD-10-PCS | Mod: S$GLB,,, | Performed by: FAMILY MEDICINE

## 2019-08-16 PROCEDURE — 3074F PR MOST RECENT SYSTOLIC BLOOD PRESSURE < 130 MM HG: ICD-10-PCS | Mod: S$GLB,,, | Performed by: FAMILY MEDICINE

## 2019-08-16 PROCEDURE — 99214 OFFICE O/P EST MOD 30 MIN: CPT | Mod: S$GLB,,, | Performed by: FAMILY MEDICINE

## 2019-08-16 PROCEDURE — 3074F SYST BP LT 130 MM HG: CPT | Mod: S$GLB,,, | Performed by: FAMILY MEDICINE

## 2019-08-16 PROCEDURE — 1101F PT FALLS ASSESS-DOCD LE1/YR: CPT | Mod: S$GLB,,, | Performed by: FAMILY MEDICINE

## 2019-08-16 PROCEDURE — 99999 PR PBB SHADOW E&M-EST. PATIENT-LVL IV: ICD-10-PCS | Mod: PBBFAC,,, | Performed by: FAMILY MEDICINE

## 2019-08-16 PROCEDURE — 1101F PR PT FALLS ASSESS DOC 0-1 FALLS W/OUT INJ PAST YR: ICD-10-PCS | Mod: S$GLB,,, | Performed by: FAMILY MEDICINE

## 2019-08-16 PROCEDURE — 97110 THERAPEUTIC EXERCISES: CPT | Mod: PN

## 2019-08-16 PROCEDURE — 99999 PR PBB SHADOW E&M-EST. PATIENT-LVL IV: CPT | Mod: PBBFAC,,, | Performed by: FAMILY MEDICINE

## 2019-08-16 RX ORDER — GABAPENTIN 300 MG/1
300 CAPSULE ORAL 3 TIMES DAILY
Qty: 90 CAPSULE | Refills: 11 | Status: SHIPPED | OUTPATIENT
Start: 2019-08-16 | End: 2019-08-19

## 2019-08-16 RX ORDER — IBUPROFEN 400 MG/1
400 TABLET ORAL EVERY 6 HOURS PRN
Qty: 30 TABLET | Refills: 3 | Status: SHIPPED | OUTPATIENT
Start: 2019-08-16 | End: 2019-10-28 | Stop reason: CLARIF

## 2019-08-16 NOTE — PATIENT INSTRUCTIONS
LOW BACK PAIN EXERCISES    Exercises that stretch and strengthen the muscles of your abdomen and spine can help prevent back problems. Strong back and abdominal muscles help you keep good posture, with your spine in its correct position.  If your muscles are tight, take a warm shower or bath before doing the exercises. Exercise on a rug or mat. Wear loose clothing. Dont wear shoes. Stop doing any exercise that causes pain until you have talked with your healthcare provider.  Ask your provider or physical therapist to help you develop an exercise program. Ask your provider how many times a week you need to do the exercises. Remember to start slowly.   Exercises  These exercises are intended only as suggestions. Be sure to check with your provider before starting the exercises.   Standing hamstring stretch: Put the heel of one leg on a stool about 15 inches high. Keep your leg straight. Lean forward, bending at the hips until you feel a mild stretch in the back of your thigh. Make sure you do not roll your shoulders or bend at the waist when doing this. You want to stretch your leg, not your lower back. Hold the stretch for 15 to 30 seconds. Repeat with each leg 3 times.   Cat and camel: Get down on your hands and knees. Let your stomach sag, allowing your back to curve downward. Hold this position for 5 seconds. Then arch your back and hold for 5 seconds. Do 2 sets of 15.   Quadruped arm and leg raise: Get down on your hands and knees. Pull in your belly button and tighten your abdominal muscles to stiffen your spine. While keeping your abdominals tight, raise one arm and the opposite leg away from you. Hold this position for 5 seconds. Lower your arm and leg slowly and change sides. Do this 10 times on each side.   Pelvic tilt: Lie on your back with your knees bent and your feet flat on the floor. Pull your belly button in towards your spine and push your lower back into the floor, flattening your back. Hold this  position for 15 seconds, then relax. Repeat 5 to 10 times.   Partial curl: Lie on your back with your knees bent and your feet flat on the floor. Draw in your abdomen and tighten your stomach muscles. With your hands stretched out in front of you, curl your upper body forward until your shoulders clear the floor. Hold this position for 3 seconds. Don't hold your breath. It helps to breathe out as you lift your shoulders. Relax back to the floor. Repeat 10 times. Build to 2 sets of 15. To challenge yourself, clasp your hands behind your head and keep your elbows out to your sides.   Gluteal stretch: Lie on your back with both knees bent. Rest your right ankle over the knee of your left leg. Grasp the thigh of the left leg and pull toward your chest. You will feel a stretch along the buttocks and possibly along the outside of your hip. Hold the stretch for 15 to 30 seconds. Then repeat the exercise with your left ankle over your right knee. Do the exercise 3 times with each leg.   Extension exercise   Lie face down on the floor for 5 minutes. If this hurts too much, lie face down with a pillow under your stomach. This should relieve your leg or back pain. When you can lie on your stomach for 5 minutes without a pillow, you can continue with Part B of this exercise.   After lying on your stomach for 5 minutes, prop yourself up on your elbows for another 5 minutes. If you can do this without having more leg or buttock pain, you can start doing part C of this exercise.   Lie on your stomach with your hands under your shoulders. Then press down on your hands and extend your elbows while keeping your hips flat on the floor. Hold for 1 second and lower yourself to the floor. Do 3 to 5 sets of 10 repetitions. Rest for 1 minute between sets. You should have no pain in your legs when you do this, but it is normal to feel some pain in your lower back.   Do this exercise several times a day.  Side plank: Lie on your side with  your legs, hips, and shoulders in a straight line. Prop yourself up onto your forearm with your elbow directly under your shoulder. Lift your hips off the floor and balance on your forearm and the outside of your foot. Try to hold this position for 15 seconds and then slowly lower your hip to the ground. Switch sides and repeat. Work up to holding for 1 minute. This exercise can be made easier by starting with your knees and hips flexed toward your chest.            Back Exercises: Leg Pull        To start, lie on your back with your knees bent and feet flat on the floor. Dont press your neck or lower back to the floor. Breathe deeply. You should feel comfortable and relaxed in this position.  · Pull one knee to your chest.  · Hold for 30-60 seconds. Return to starting position.  · Repeat 2 times.  · Switch legs.  · For a double leg pull, pull both legs to your chest at the same time. Repeat 2 times.  For your safety, check with your healthcare provider before starting an exercise program.   © 9554-9496 [employer] [address] [place] [address], [place] [address]. All rights reserved. This information is not intended as a substitute for professional medical care. Always follow your healthcare professional's instructions.

## 2019-08-16 NOTE — PROGRESS NOTES
Physical Therapy Daily Treatment Note     Name: Maegan [facility]  Clinic Number: [id number]    Therapy Diagnosis:   Encounter Diagnoses   Name Primary?    Decreased ROM of lumbar spine     Acute midline low back pain without sciatica      Physician: Leandro Carr MD    Visit Date: 8/16/2019    Physician Orders: PT Eval and Treat   Medical Diagnosis from Referral:   M54.42,M54.41,G89.29 (ICD-10-CM) - Chronic bilateral low back pain with bilateral sciatica   M54.16 (ICD-10-CM) - Lumbar radiculopathy   M51.36 (ICD-10-CM) - Lumbar degenerative disc disease      Evaluation Date: 7/12/2019  Authorization Period Expiration: 12/31/2019  Plan of Care Expiration: 9/6/19  Visit # / Visits authorized: 7/12  FOTO: 7/5 DONE  GCODE: 7/10  PTA Visit: 1/6    Cap Visit:  60.64  Cap Total: 622.85    Time In: 1:05  Time Out: 2:00  Total Billable Time: 25 minutes (2 TE)    Precautions: HTN, standard    Subjective     Pt reports:  His back hurts mare at the end of the work day.    He was compliant with home exercise program.  Response to previous treatment: no adverse effects.  Functional change: none reported by pt    Pain: 2/10  Location: bilateral back      Objective     Maegan received therapeutic exercises to develop strength, endurance, ROM, flexibility, posture and core stabilization for 55 minutes  including:     -Sciatic nerve glides: 20x B   -Bridges on SB: 2x10 DL  -TAs c/ hip extension: 15x B 1.5#  -DKTC c/ SB: 2x15 c/ green physioball  -SL Lumbar rotations 2x10 B  -Iso abds in all direction: 20x each way c/ SB  -Standing air squats: 3x10, cues to decrease depth - not performed today  -Palloff press:  BTB 2x10 B  -Seated marching on SB: 2x10 B on red SB  -Leg press: 3x10 DL 8.5 plates, 2x10 SL B 5.0 plates  -Standing lumbar extensions: 20x slowly    Maegan received the following manual therapy techniques: Joint mobilizations, Myofacial release and Soft tissue Mobilization were applied to the: lower back for 0 minutes,  "including: Not performed today  Long axis lumbar traction to each leg separately    Home Exercises Provided and Patient Education Provided   Education provided:   - cont HEP    Written Home Exercises Provided: Continue HEP previously given.  Exercises were reviewed and Maegan was able to demonstrate them prior to the end of the session.  Maegan demonstrated good  understanding of the education provided.     See EMR under Patient Instructions for exercises provided 07/12/2019    Assessment     Patient requires near constant verbal cues to slow down pace and for correct technique with therex.  Able to tolerate added parloff press and increased weight with LEg Press with reports of "good:" after treatment.  Not specific to scale.    Maegan is progressing well towards his goals.   Pt prognosis is Good.     Pt will continue to benefit from skilled outpatient physical therapy to address the deficits listed in the problem list box on initial evaluation, provide pt/family education and to maximize pt's level of independence in the home and community environment.   Pt's spiritual, cultural and educational needs considered and pt agreeable to plan of care and goals.     Anticipated barriers to physical therapy: see precautions.     GOALS: Short Term Goals: 4 weeks  1. Pt will demo good TA muscle contraction for improved deep abdominal strength and lumbar stability. (Ongoing, not met)  2. Increase lumbar ROM to WNL loss in order to improve functional mobility. (Ongoing, not met)   3. Pt will demo good sitting/standing posture and body mechanics for improved spine health and decreased risk of future injury.(Ongoing, not met)  4. Pt to tolerate HEP to improve ROM and independence with ADL's.(Ongoing, not met)     Long Term Goals: 8 weeks  1. Report decreased low back pain without radiculopathy to </= 0-1/10  to increase tolerance for ADLs and increased QoL.(Ongoing, not met)  2. Increase strength to >/= 4/5 MMT grade for core " and BLE to increase tolerance for ADL and work activities.(Ongoing, not met)  3. Pt to demonstrate negative SLR and/or Slump Test in order to show improved core strength and decreased nerve/dural tension.(Ongoing, not met)  4. Patient's goal: to not have any pain in back or legs(Ongoing, not met)  5. Pt will report at CJ level (20-40% impaired) on FOTO lumbar score for low back pain disability to demonstrate decrease in disability and improvement in back pain(Ongoing, not met)    Plan     Cont PT as per POC, advance PT as tolerated, monitor response to session. Assess pelvic alignment next visit.    Amanda Davis, PTA

## 2019-08-16 NOTE — PROGRESS NOTES
(Portions of this note were dictated using voice recognition software and may contain dictation related errors in spelling/grammar/syntax not found on text review)    CC:   Chief Complaint   Patient presents with    Back Pain       HPI: 74 y.o. male here with low back pain. Saw Dr. beaulieu on July 1st for bilateral lower back pain. Was doing some exertion when he noticed the onset of the pain. No significant trauma.  No chronic back pain. Will get shooting pains on his right leg that are described as electricity associated with some weakness symptoms that will happen intermittently when he gets up out of his seat at times.  No cramping when walking.  MRI was done of the back which demonstrated spinal stenosis moderate severe L4-L5 with some slightly worsened neural foraminal narrowing on the right, advanced bilateral facet arthritis noted on L5-S1.  Has not tried much medication except for occasional ibuprofen which seems to help.  He will sometimes massage the right lower back and seems to help as well.  Was wondering what next steps are    Past Medical History:   Diagnosis Date    Hyperlipidemia     Hypertension     Insomnia     CRIS (obstructive sleep apnea)     Vitamin D deficiency        Past Surgical History:   Procedure Laterality Date    BASAL CELL CARCINOMA EXCISION      cataract surgery         Family History   Problem Relation Age of Onset    Prostate cancer Brother         prostate problems, unknown cancer    Heart attack Brother 72    Hypertension Brother        Social History     Tobacco Use    Smoking status: Former Smoker   Substance Use Topics    Alcohol use: No     Frequency: Monthly or less     Drinks per session: 1 or 2     Binge frequency: Never    Drug use: No       ROS:  GENERAL: No fever, chills, fatigability or weight loss.  SKIN: No rashes, no itching.  HEAD: No headaches.  EYES: No visual changes  EARS: No ear pain or changes in hearing.  NOSE: No congestion or  rhinorrhea.  MOUTH & THROAT: No hoarseness, change in voice, or sore throat.  NODES: Denies swollen glands.  CHEST: Denies VALLADARES, cyanosis, wheezing, cough and sputum production.  CARDIOVASCULAR: Denies chest pain, PND, orthopnea.  ABDOMEN: No nausea, vomiting, or changes in bowel function.  URINARY: No flank pain, dysuria or hematuria.  PERIPHERAL VASCULAR: No claudication or cyanosis.  MUSCULOSKELETAL:  Above  NEUROLOGIC:  Above.  No saddle anesthesia.       Vital signs reviewed  PE:   APPEARANCE: Well nourished, well developed, in no acute distress.    HEAD: Normocephalic, atraumatic.  EYES:  Conjunctivae noninjected.  MSK/neuro:  2+ patellar and ankle reflexes bilaterally. Negative straight leg raise bilaterally. No tenderness to palpation of lumbar paralumbar spine.  No sacroiliac tenderness to palpation.  Motor testing demonstrates 4+/5 hip flexor strength bilaterally, 5/5 quad strength bilaterally, 4+/5 hamstring strength bilaterally.  5/5 ft dorsiflexion and plantar flexion bilaterally. Normal ability to rise out of a chair without using his hands.  Normal gait and station.      IMPRESSION  1. Spinal stenosis of lumbar region without neurogenic claudication            PLAN  Doing physical therapy right now, does seem to help when he does it but then sometimes later he will get recurrence of the above pains.  Will try ibuprofen as needed, also trial of gabapentin discussed.  Will consult Pain Management for further interventional consideration given continued symptoms despite PT.  Could consider neuro surgery evaluation if unsuccessful intervention otherwise.  No immediate neurological compromise of concern    Orders Placed This Encounter   Procedures    Ambulatory referral to Pain Clinic           Answers for HPI/ROS submitted by the patient on 8/15/2019   Back pain  Chronicity: new  Onset: more than 1 month ago  Frequency: daily  Progression since onset: waxing and waning  Pain location: sacro-iliac  Pain  quality: aching  Radiates to: right thigh  Pain - numeric: 6/10  Pain is: worse during the day  Aggravated by: standing  Stiffness is present: in the morning  abdominal pain: No  bladder incontinence: No  bowel incontinence: No  chest pain: No  dysuria: No  fever: No  headaches: No  leg pain: Yes  numbness: Yes  paresis: No  paresthesias: No  pelvic pain: No  perianal numbness: No  tingling: No  weakness: Yes  weight loss: No  genital pain: No  hematuria: No  Risk factors: obesity  Pain severity: moderate  Treatments tried: home exercises, ice

## 2019-08-19 ENCOUNTER — TELEPHONE (OUTPATIENT)
Dept: PAIN MEDICINE | Facility: CLINIC | Age: 74
End: 2019-08-19

## 2019-08-19 ENCOUNTER — PATIENT OUTREACH (OUTPATIENT)
Dept: ADMINISTRATIVE | Facility: OTHER | Age: 74
End: 2019-08-19

## 2019-08-19 ENCOUNTER — OFFICE VISIT (OUTPATIENT)
Dept: SPINE | Facility: CLINIC | Age: 74
End: 2019-08-19
Attending: ANESTHESIOLOGY
Payer: MEDICARE

## 2019-08-19 VITALS
DIASTOLIC BLOOD PRESSURE: 64 MMHG | HEART RATE: 66 BPM | RESPIRATION RATE: 18 BRPM | WEIGHT: 207 LBS | TEMPERATURE: 98 F | BODY MASS INDEX: 28.04 KG/M2 | HEIGHT: 72 IN | SYSTOLIC BLOOD PRESSURE: 121 MMHG

## 2019-08-19 DIAGNOSIS — M51.36 DDD (DEGENERATIVE DISC DISEASE), LUMBAR: Primary | ICD-10-CM

## 2019-08-19 DIAGNOSIS — M54.15 RADICULOPATHY OF THORACOLUMBAR REGION: ICD-10-CM

## 2019-08-19 DIAGNOSIS — M48.062 SPINAL STENOSIS OF LUMBAR REGION WITH NEUROGENIC CLAUDICATION: ICD-10-CM

## 2019-08-19 DIAGNOSIS — M47.819 SPONDYLOSIS WITHOUT MYELOPATHY: ICD-10-CM

## 2019-08-19 DIAGNOSIS — M70.60 GREATER TROCHANTERIC BURSITIS, UNSPECIFIED LATERALITY: ICD-10-CM

## 2019-08-19 PROBLEM — M51.369 DDD (DEGENERATIVE DISC DISEASE), LUMBAR: Status: ACTIVE | Noted: 2019-08-19

## 2019-08-19 PROBLEM — M47.816 LUMBAR SPONDYLOSIS: Status: ACTIVE | Noted: 2019-08-19

## 2019-08-19 PROCEDURE — 1101F PT FALLS ASSESS-DOCD LE1/YR: CPT | Mod: S$GLB,,, | Performed by: ANESTHESIOLOGY

## 2019-08-19 PROCEDURE — 99999 PR PBB SHADOW E&M-EST. PATIENT-LVL IV: CPT | Mod: PBBFAC,,, | Performed by: ANESTHESIOLOGY

## 2019-08-19 PROCEDURE — 1101F PR PT FALLS ASSESS DOC 0-1 FALLS W/OUT INJ PAST YR: ICD-10-PCS | Mod: S$GLB,,, | Performed by: ANESTHESIOLOGY

## 2019-08-19 PROCEDURE — 99204 OFFICE O/P NEW MOD 45 MIN: CPT | Mod: GC,S$GLB,, | Performed by: ANESTHESIOLOGY

## 2019-08-19 PROCEDURE — 99204 PR OFFICE/OUTPT VISIT, NEW, LEVL IV, 45-59 MIN: ICD-10-PCS | Mod: GC,S$GLB,, | Performed by: ANESTHESIOLOGY

## 2019-08-19 PROCEDURE — 99999 PR PBB SHADOW E&M-EST. PATIENT-LVL IV: ICD-10-PCS | Mod: PBBFAC,,, | Performed by: ANESTHESIOLOGY

## 2019-08-19 PROCEDURE — 3078F PR MOST RECENT DIASTOLIC BLOOD PRESSURE < 80 MM HG: ICD-10-PCS | Mod: S$GLB,,, | Performed by: ANESTHESIOLOGY

## 2019-08-19 PROCEDURE — 3078F DIAST BP <80 MM HG: CPT | Mod: S$GLB,,, | Performed by: ANESTHESIOLOGY

## 2019-08-19 PROCEDURE — 3074F PR MOST RECENT SYSTOLIC BLOOD PRESSURE < 130 MM HG: ICD-10-PCS | Mod: S$GLB,,, | Performed by: ANESTHESIOLOGY

## 2019-08-19 PROCEDURE — 3074F SYST BP LT 130 MM HG: CPT | Mod: S$GLB,,, | Performed by: ANESTHESIOLOGY

## 2019-08-19 NOTE — LETTER
August 23, 2019      Tobin Thomason MD  [address]  [address]  [facility] [address]           [address]  [address], [address]  [facility] [address]  Phone: [phone]  Fax: [phone]          Patient: Maegan Cleary   MR Number: [id number]   YOB: 1945   Date of Visit: 8/19/2019       Dear Dr. Tobin Thomason:    Thank you for referring Maegan Cleary to me for evaluation. Attached you will find relevant portions of my assessment and plan of care.    If you have questions, please do not hesitate to call me. I look forward to following Maegan Cleary along with you.    Sincerely,    Maikel Millan MD    Enclosure  CC:  No Recipients    If you would like to receive this communication electronically, please contact [email or web address] or [phone] to request more information on [employer] Link access.    For providers and/or their staff who would like to refer a patient to [facility], please contact us through our one-stop-shop provider referral line, [facility], at [phone].    If you feel you have received this communication in error or would no longer like to receive these types of communications, please e-mail [email or web address]

## 2019-08-19 NOTE — PROGRESS NOTES
Chronic Pain - New Consult    Referring Physician: Tobin Thomason MD    Chief Complaint:   Chief Complaint   Patient presents with    Low-back Pain        SUBJECTIVE:    Maegan Cleary presents to the clinic for the evaluation of back pain. The pain started 8 weeks ago following non related injury and symptoms have been worsening.The pain is located in the lower back area and radiates to the right legs.  The pain is described as burning, sharp, shooting, tight band and intermittent and is rated as 7/10. The pain is rated with a score of  4/10 on the BEST day and a score of 9/10 on the WORST day.  Symptoms interfere with daily activity. The pain is exacerbated by Standing, Bending, Morning, Lifting and Getting out of bed/chair.  The pain is mitigated by ice and massage. He reports spending 0 hours per day reclining. The patient reports 7 hours of uninterrupted sleep per night. Pain radiates as far as right ankle down the back of the leg. When it happens he feels unstable and that he has to wait for it to pass. Patient states he was prescribed gabapentin but that his insurer denied it so he never took it. Additionally, he was on gabapentin many years ago for an ulnar neuropathy but that the medication did not help him.    Patient denies night fever/night sweats, urinary incontinence, bowel incontinence, significant weight loss, significant motor weakness and loss of sensations.    Physical Therapy/Home Exercise: yes      Pain Disability Index Review:  Last 3 PDI Scores 2019   Pain Disability Index (PDI) 14       Pain Medications:    - Adjuvant Medications: Advil,Motrin ( Ibuprofen) and Tylenol     report:  Not applicable    Pain Procedures: none    Imagin/5/19  Narrative     EXAMINATION:  MRI LUMBAR SPINE WITHOUT CONTRAST    CLINICAL HISTORY:  Low back pain, >6wks conservative tx, persistent-progressive sx, surgical candidate; Low back pain    TECHNIQUE:  Multiplanar, multisequence MR images were  acquired from the thoracolumbar junction to the sacrum without the administration of contrast.    COMPARISON:  Lumbar spine radiograph dated 07/01/2019    FINDINGS:  Lumbar spine vertebral body heights and alignment are maintained.  Incidental vertebral hemangiomas at L2 and S1.  No suspicious marrow enhancing lesion.  The spinal cord terminus lies L1-L2.  There are several, partially visualized T2 hyperintense renal lesions, probable cysts.  No abdominal aortic aneurysm.    T12-L1: No significant posterior disc herniation, spinal canal stenosis, or neural foraminal impingement.    L1-L2: Minimal bulge with flavum thickening without significant central canal stenosis or neural foraminal impingement.    L2-L3: Mild flavum buckling with minimal bulge without significant central canal stenosis or neural foraminal impingement.    L3-L4: Minimal bulge with flavum thickening and facet arthrosis resulting in very mild central canal narrowing.  Mild right without significant left neural foraminal narrowing.    L4-L5: Mild circumferential bulge with flavum thickening and facet arthropathy contributing to moderately severe central stenosis and lateral recess narrowing.  Mild bilateral neural foraminal narrowing, slightly worse on the right.    L5-S1: There is posterior annular fissure.  Minimal bulge, flavum thickening, and advanced bilateral facet DJD noting effusion on the right.  Associated lateral recess narrowing without significant central canal stenosis.  No significant neural foraminal impingement.      Impression       Lumbar spondylosis as detailed, most severe at L4-L5 resulting in moderately severe spinal canal stenosis and lateral recess narrowing.    Multilevel variable degree of neural foraminal narrowing.    Posterior annular fissure of the L5-S1 disc.    Multilevel facet arthrosis with trace effusion on the right at L5-S1.      Electronically signed by: Bill Casillas  Date: 07/05/2019  Time: 09:11                       7/1/19  Narrative     EXAMINATION:  XR LUMBAR SPINE AP AND LATERAL    CLINICAL HISTORY:  Low back pain, >6wks conservative tx, persistent-progressive sx, surgical candidate;  Low back pain    FINDINGS:  There is mild DJD.  No fracture dislocation bone destruction seen.      Impression       No acute process seen.      Electronically signed by: Obed Leo MD  Date: 07/01/2019  Time: 12:32              Past Medical History:   Diagnosis Date    Hyperlipidemia     Hypertension     Insomnia     CRIS (obstructive sleep apnea)     Vitamin D deficiency      Past Surgical History:   Procedure Laterality Date    BASAL CELL CARCINOMA EXCISION      cataract surgery       Social History     Socioeconomic History    Marital status:      Spouse name: Not on file    Number of children: Not on file    Years of education: Not on file    Highest education level: Not on file   Occupational History    Occupation: primary care physician, [employer]   Social Needs    Financial resource strain: Not hard at all    Food insecurity:     Worry: Never true     Inability: Never true    Transportation needs:     Medical: No     Non-medical: No   Tobacco Use    Smoking status: Former Smoker   Substance and Sexual Activity    Alcohol use: No     Frequency: Monthly or less     Drinks per session: 1 or 2     Binge frequency: Never    Drug use: No    Sexual activity: Not on file   Lifestyle    Physical activity:     Days per week: 0 days     Minutes per session: Not on file    Stress: Not at all   Relationships    Social connections:     Talks on phone: More than three times a week     Gets together: Twice a week     Attends [employer] service: Not on file     Active member of club or organization: Yes     Attends meetings of clubs or organizations: More than 4 times per year     Relationship status:    Other Topics Concern    Not on file   Social History Narrative    Regular exercise .   Healthy diet     Family History   Problem Relation Age of Onset    Prostate cancer Brother         prostate problems, unknown cancer    Heart attack Brother 72    Hypertension Brother        Review of patient's allergies indicates:  No Known Allergies    Current Outpatient Medications   Medication Sig    cyanocobalamin 2000 MCG tablet Take 5,000 mcg by mouth once daily.    ibuprofen (ADVIL,MOTRIN) 400 MG tablet Take 1 tablet (400 mg total) by mouth every 6 (six) hours as needed (pain).    losartan (COZAAR) 50 MG tablet Take 1 tablet (50 mg total) by mouth once daily.    nebivolol (BYSTOLIC) 10 MG Tab Take 1 tablet (10 mg total) by mouth once daily.    saw palmetto 80 MG capsule Take 80 mg by mouth 2 (two) times daily.    vitamin D 1000 units Tab Take 2,000 mg by mouth once daily.    aspirin (ECOTRIN) 81 MG EC tablet Take 81 mg by mouth once daily.    gabapentin (NEURONTIN) 300 MG capsule Take 1 capsule (300 mg total) by mouth 3 (three) times daily.    hydrOXYzine pamoate (VISTARIL) 50 MG Cap Take 1 capsule (50 mg total) by mouth nightly.     No current facility-administered medications for this visit.        REVIEW OF SYSTEMS:    GENERAL:  No weight loss, malaise or fevers.  HEENT:  Negative for frequent or significant headaches.  NECK:  Negative for lumps, goiter, pain and significant neck swelling.  RESPIRATORY:  Negative for cough, wheezing or shortness of breath.  CARDIOVASCULAR:  Negative for chest pain, leg swelling or palpitations. High BP  GI:  Negative for abdominal discomfort, blood in stools or black stools or change in bowel habits.  MUSCULOSKELETAL:  See HPI.  SKIN:  Negative for lesions, rash, and itching.  PSYCH:  +ve for sleep disturbance, mood disorder and recent psychosocial stressors.  HEMATOLOGY/LYMPHOLOGY:  Negative for prolonged bleeding, bruising easily or swollen nodes.  NEURO:   No history of headaches, syncope, paralysis, seizures or tremors.  All other reviewed and negative  other than HPI.    OBJECTIVE:    /64   Pulse 66   Temp 97.8 °F (36.6 °C)   Resp 18   Ht 6' (1.829 m)   Wt 93.9 kg (207 lb)   BMI 28.07 kg/m²     PHYSICAL EXAMINATION:    General appearance: Well appearing, in no acute distress, alert and oriented x3.  Psych:  Mood and affect appropriate.  Skin: Skin color, texture, turgor normal, no rashes or lesions, in both upper and lower body.  Head/face:  Normocephalic, atraumatic. No palpable lymph nodes.  Neck: No pain to palpation over the cervical paraspinous muscles. Spurling Negative. No pain with neck flexion, extension, or lateral flexion.   Cor: RRR  Pulm: CTA  GI:  Soft and non-tender.  Back: Straight leg raising in the sitting and supine positions is negative to radicular pain. No pain to palpation over the spine or costovertebral angles. Normal range of motion without pain reproduction.  Extremities: Peripheral joint ROM is full and pain free without obvious instability or laxity in all four extremities. No deformities, edema, or skin discoloration. Good capillary refill.  Musculoskeletal: FABERS positive on right. GTB tender on right. Bilateral upper and lower extremity strength is normal and symmetric.  No atrophy or tone abnormalities are noted.  Neuro: Bilateral upper and lower extremity coordination and muscle stretch reflexes are physiologic and symmetric.  Plantar response are downgoing. No loss of sensation is noted.  Gait: normal.    ASSESSMENT: 74 y.o. year old male with lower back pain, consistent with      1. DDD (degenerative disc disease), lumbar  Ambulatory consult to Physical Therapy   2. Spinal stenosis of lumbar region with neurogenic claudication  Ambulatory consult to Physical Therapy   3. Spondylosis without myelopathy  Ambulatory consult to Physical Therapy   4. Radiculopathy of thoracolumbar region  Ambulatory consult to Physical Therapy   5. Greater trochanteric bursitis, unspecified laterality  Ambulatory consult to Physical  Therapy         PLAN:   - I have stressed the importance of physical activity and a home exercise plan to help with pain and improve health.  - Patient can continue with medications for now since they are providing benefits, using them appropriately, and without side effects.  - Will Schedule for Right L4/5 TFESI  - Consider GTB injection vs SI joint injection on Right at future visits  - RTC 2-4 weeks post-procedure  - Referral to Physical Therapy  - Will prescribe Mobic 7.5 mg with breakfast  . Patient advised to stop his other NSAIDs  - Counseled patient regarding the importance of physical therapy.    The above plan and management options were discussed at length with patient. Patient is in agreement with the above and verbalized understanding. It will be communicated with the referring physician via electronic record, fax, or mail.    Ephraim Lutz    I have personally reviewed the history and exam of this patient and agree with the resident/fellow/NPs note as stated above.    Maikel Millan MD    08/19/2019

## 2019-08-19 NOTE — H&P (VIEW-ONLY)
Chronic Pain - New Consult    Referring Physician: Tobin Thomason MD    Chief Complaint:   Chief Complaint   Patient presents with    Low-back Pain        SUBJECTIVE:    Maegan Cleary presents to the clinic for the evaluation of back pain. The pain started 8 weeks ago following non related injury and symptoms have been worsening.The pain is located in the lower back area and radiates to the right legs.  The pain is described as burning, sharp, shooting, tight band and intermittent and is rated as 7/10. The pain is rated with a score of  4/10 on the BEST day and a score of 9/10 on the WORST day.  Symptoms interfere with daily activity. The pain is exacerbated by Standing, Bending, Morning, Lifting and Getting out of bed/chair.  The pain is mitigated by ice and massage. He reports spending 0 hours per day reclining. The patient reports 7 hours of uninterrupted sleep per night. Pain radiates as far as right ankle down the back of the leg. When it happens he feels unstable and that he has to wait for it to pass. Patient states he was prescribed gabapentin but that his insurer denied it so he never took it. Additionally, he was on gabapentin many years ago for an ulnar neuropathy but that the medication did not help him.    Patient denies night fever/night sweats, urinary incontinence, bowel incontinence, significant weight loss, significant motor weakness and loss of sensations.    Physical Therapy/Home Exercise: yes      Pain Disability Index Review:  Last 3 PDI Scores 2019   Pain Disability Index (PDI) 14       Pain Medications:    - Adjuvant Medications: Advil,Motrin ( Ibuprofen) and Tylenol     report:  Not applicable    Pain Procedures: none    Imagin/5/19  Narrative     EXAMINATION:  MRI LUMBAR SPINE WITHOUT CONTRAST    CLINICAL HISTORY:  Low back pain, >6wks conservative tx, persistent-progressive sx, surgical candidate; Low back pain    TECHNIQUE:  Multiplanar, multisequence MR images were  acquired from the thoracolumbar junction to the sacrum without the administration of contrast.    COMPARISON:  Lumbar spine radiograph dated 07/01/2019    FINDINGS:  Lumbar spine vertebral body heights and alignment are maintained.  Incidental vertebral hemangiomas at L2 and S1.  No suspicious marrow enhancing lesion.  The spinal cord terminus lies L1-L2.  There are several, partially visualized T2 hyperintense renal lesions, probable cysts.  No abdominal aortic aneurysm.    T12-L1: No significant posterior disc herniation, spinal canal stenosis, or neural foraminal impingement.    L1-L2: Minimal bulge with flavum thickening without significant central canal stenosis or neural foraminal impingement.    L2-L3: Mild flavum buckling with minimal bulge without significant central canal stenosis or neural foraminal impingement.    L3-L4: Minimal bulge with flavum thickening and facet arthrosis resulting in very mild central canal narrowing.  Mild right without significant left neural foraminal narrowing.    L4-L5: Mild circumferential bulge with flavum thickening and facet arthropathy contributing to moderately severe central stenosis and lateral recess narrowing.  Mild bilateral neural foraminal narrowing, slightly worse on the right.    L5-S1: There is posterior annular fissure.  Minimal bulge, flavum thickening, and advanced bilateral facet DJD noting effusion on the right.  Associated lateral recess narrowing without significant central canal stenosis.  No significant neural foraminal impingement.      Impression       Lumbar spondylosis as detailed, most severe at L4-L5 resulting in moderately severe spinal canal stenosis and lateral recess narrowing.    Multilevel variable degree of neural foraminal narrowing.    Posterior annular fissure of the L5-S1 disc.    Multilevel facet arthrosis with trace effusion on the right at L5-S1.      Electronically signed by: Bill Casillas  Date: 07/05/2019  Time: 09:11                       7/1/19  Narrative     EXAMINATION:  XR LUMBAR SPINE AP AND LATERAL    CLINICAL HISTORY:  Low back pain, >6wks conservative tx, persistent-progressive sx, surgical candidate;  Low back pain    FINDINGS:  There is mild DJD.  No fracture dislocation bone destruction seen.      Impression       No acute process seen.      Electronically signed by: Obed Leo MD  Date: 07/01/2019  Time: 12:32              Past Medical History:   Diagnosis Date    Hyperlipidemia     Hypertension     Insomnia     CRIS (obstructive sleep apnea)     Vitamin D deficiency      Past Surgical History:   Procedure Laterality Date    BASAL CELL CARCINOMA EXCISION      cataract surgery       Social History     Socioeconomic History    Marital status:      Spouse name: Not on file    Number of children: Not on file    Years of education: Not on file    Highest education level: Not on file   Occupational History    Occupation: primary care physician, [employer]   Social Needs    Financial resource strain: Not hard at all    Food insecurity:     Worry: Never true     Inability: Never true    Transportation needs:     Medical: No     Non-medical: No   Tobacco Use    Smoking status: Former Smoker   Substance and Sexual Activity    Alcohol use: No     Frequency: Monthly or less     Drinks per session: 1 or 2     Binge frequency: Never    Drug use: No    Sexual activity: Not on file   Lifestyle    Physical activity:     Days per week: 0 days     Minutes per session: Not on file    Stress: Not at all   Relationships    Social connections:     Talks on phone: More than three times a week     Gets together: Twice a week     Attends Protestant service: Not on file     Active member of club or organization: Yes     Attends meetings of clubs or organizations: More than 4 times per year     Relationship status:    Other Topics Concern    Not on file   Social History Narrative    Regular exercise .   Healthy diet     Family History   Problem Relation Age of Onset    Prostate cancer Brother         prostate problems, unknown cancer    Heart attack Brother 72    Hypertension Brother        Review of patient's allergies indicates:  No Known Allergies    Current Outpatient Medications   Medication Sig    cyanocobalamin 2000 MCG tablet Take 5,000 mcg by mouth once daily.    ibuprofen (ADVIL,MOTRIN) 400 MG tablet Take 1 tablet (400 mg total) by mouth every 6 (six) hours as needed (pain).    losartan (COZAAR) 50 MG tablet Take 1 tablet (50 mg total) by mouth once daily.    nebivolol (BYSTOLIC) 10 MG Tab Take 1 tablet (10 mg total) by mouth once daily.    saw palmetto 80 MG capsule Take 80 mg by mouth 2 (two) times daily.    vitamin D 1000 units Tab Take 2,000 mg by mouth once daily.    aspirin (ECOTRIN) 81 MG EC tablet Take 81 mg by mouth once daily.    gabapentin (NEURONTIN) 300 MG capsule Take 1 capsule (300 mg total) by mouth 3 (three) times daily.    hydrOXYzine pamoate (VISTARIL) 50 MG Cap Take 1 capsule (50 mg total) by mouth nightly.     No current facility-administered medications for this visit.        REVIEW OF SYSTEMS:    GENERAL:  No weight loss, malaise or fevers.  HEENT:  Negative for frequent or significant headaches.  NECK:  Negative for lumps, goiter, pain and significant neck swelling.  RESPIRATORY:  Negative for cough, wheezing or shortness of breath.  CARDIOVASCULAR:  Negative for chest pain, leg swelling or palpitations. High BP  GI:  Negative for abdominal discomfort, blood in stools or black stools or change in bowel habits.  MUSCULOSKELETAL:  See HPI.  SKIN:  Negative for lesions, rash, and itching.  PSYCH:  +ve for sleep disturbance, mood disorder and recent psychosocial stressors.  HEMATOLOGY/LYMPHOLOGY:  Negative for prolonged bleeding, bruising easily or swollen nodes.  NEURO:   No history of headaches, syncope, paralysis, seizures or tremors.  All other reviewed and negative  other than HPI.    OBJECTIVE:    /64   Pulse 66   Temp 97.8 °F (36.6 °C)   Resp 18   Ht 6' (1.829 m)   Wt 93.9 kg (207 lb)   BMI 28.07 kg/m²     PHYSICAL EXAMINATION:    General appearance: Well appearing, in no acute distress, alert and oriented x3.  Psych:  Mood and affect appropriate.  Skin: Skin color, texture, turgor normal, no rashes or lesions, in both upper and lower body.  Head/face:  Normocephalic, atraumatic. No palpable lymph nodes.  Neck: No pain to palpation over the cervical paraspinous muscles. Spurling Negative. No pain with neck flexion, extension, or lateral flexion.   Cor: RRR  Pulm: CTA  GI:  Soft and non-tender.  Back: Straight leg raising in the sitting and supine positions is negative to radicular pain. No pain to palpation over the spine or costovertebral angles. Normal range of motion without pain reproduction.  Extremities: Peripheral joint ROM is full and pain free without obvious instability or laxity in all four extremities. No deformities, edema, or skin discoloration. Good capillary refill.  Musculoskeletal: FABERS positive on right. GTB tender on right. Bilateral upper and lower extremity strength is normal and symmetric.  No atrophy or tone abnormalities are noted.  Neuro: Bilateral upper and lower extremity coordination and muscle stretch reflexes are physiologic and symmetric.  Plantar response are downgoing. No loss of sensation is noted.  Gait: normal.    ASSESSMENT: 74 y.o. year old male with lower back pain, consistent with      1. DDD (degenerative disc disease), lumbar  Ambulatory consult to Physical Therapy   2. Spinal stenosis of lumbar region with neurogenic claudication  Ambulatory consult to Physical Therapy   3. Spondylosis without myelopathy  Ambulatory consult to Physical Therapy   4. Radiculopathy of thoracolumbar region  Ambulatory consult to Physical Therapy   5. Greater trochanteric bursitis, unspecified laterality  Ambulatory consult to Physical  Therapy         PLAN:   - I have stressed the importance of physical activity and a home exercise plan to help with pain and improve health.  - Patient can continue with medications for now since they are providing benefits, using them appropriately, and without side effects.  - Will Schedule for Right L4/5 TFESI  - Consider GTB injection vs SI joint injection on Right at future visits  - RTC 2-4 weeks post-procedure  - Referral to Physical Therapy  - Will prescribe Mobic 7.5 mg with breakfast  . Patient advised to stop his other NSAIDs  - Counseled patient regarding the importance of physical therapy.    The above plan and management options were discussed at length with patient. Patient is in agreement with the above and verbalized understanding. It will be communicated with the referring physician via electronic record, fax, or mail.    Ephraim Lutz    I have personally reviewed the history and exam of this patient and agree with the resident/fellow/NPs note as stated above.    Maikel Millan MD    08/19/2019

## 2019-08-19 NOTE — TELEPHONE ENCOUNTER
Staff contacted pt regarding a sooner appt to see Dr. Millan    Staff offered pt to be seen for 8/19/19 @ 3:00 in back and spine.    Pt accepted      Pt confirmed. Informed pt of IMP. Verified pt insurance. Informed pt of location and suite number.  Patient gave verbal understanding

## 2019-08-23 PROBLEM — M47.819 SPONDYLOSIS WITHOUT MYELOPATHY: Status: ACTIVE | Noted: 2019-08-23

## 2019-08-23 PROBLEM — M47.816 LUMBAR SPONDYLOSIS: Status: RESOLVED | Noted: 2019-08-19 | Resolved: 2019-08-23

## 2019-08-23 RX ORDER — MELOXICAM 7.5 MG/1
7.5 TABLET ORAL
Qty: 30 TABLET | Refills: 2 | Status: SHIPPED | OUTPATIENT
Start: 2019-08-23 | End: 2019-09-22

## 2019-08-26 ENCOUNTER — PATIENT MESSAGE (OUTPATIENT)
Dept: FAMILY MEDICINE | Facility: CLINIC | Age: 74
End: 2019-08-26

## 2019-08-26 ENCOUNTER — HOSPITAL ENCOUNTER (OUTPATIENT)
Facility: OTHER | Age: 74
Discharge: HOME | End: 2019-08-26
Attending: ANESTHESIOLOGY | Admitting: ANESTHESIOLOGY
Payer: MEDICARE

## 2019-08-26 VITALS
OXYGEN SATURATION: 97 % | HEART RATE: 58 BPM | TEMPERATURE: 98 F | SYSTOLIC BLOOD PRESSURE: 101 MMHG | RESPIRATION RATE: 18 BRPM | DIASTOLIC BLOOD PRESSURE: 62 MMHG

## 2019-08-26 DIAGNOSIS — M51.36 DDD (DEGENERATIVE DISC DISEASE), LUMBAR: ICD-10-CM

## 2019-08-26 DIAGNOSIS — M48.062 SPINAL STENOSIS OF LUMBAR REGION WITH NEUROGENIC CLAUDICATION: Primary | ICD-10-CM

## 2019-08-26 DIAGNOSIS — G89.29 CHRONIC PAIN: ICD-10-CM

## 2019-08-26 DIAGNOSIS — M47.819 SPONDYLOSIS WITHOUT MYELOPATHY: Primary | ICD-10-CM

## 2019-08-26 PROCEDURE — 64484 NJX AA&/STRD TFRM EPI L/S EA: CPT | Mod: RT,,, | Performed by: ANESTHESIOLOGY

## 2019-08-26 PROCEDURE — 64483 PR EPIDURAL INJ, ANES/STEROID, TRANSFORAMINAL, LUMB/SACR, SNGL LEVL: ICD-10-PCS | Mod: RT,,, | Performed by: ANESTHESIOLOGY

## 2019-08-26 PROCEDURE — 25500020 PHARM REV CODE 255: Performed by: ANESTHESIOLOGY

## 2019-08-26 PROCEDURE — 64483 NJX AA&/STRD TFRM EPI L/S 1: CPT | Mod: RT,,, | Performed by: ANESTHESIOLOGY

## 2019-08-26 PROCEDURE — 64484 NJX AA&/STRD TFRM EPI L/S EA: CPT | Performed by: ANESTHESIOLOGY

## 2019-08-26 PROCEDURE — 63600175 PHARM REV CODE 636 W HCPCS: Performed by: ANESTHESIOLOGY

## 2019-08-26 PROCEDURE — 64484 PRA INJECT ANES/STEROID FORAMEN LUMBAR/SACRAL W IMG GUIDE ,EA ADD LEVEL: ICD-10-PCS | Mod: RT,,, | Performed by: ANESTHESIOLOGY

## 2019-08-26 PROCEDURE — 64483 NJX AA&/STRD TFRM EPI L/S 1: CPT | Performed by: ANESTHESIOLOGY

## 2019-08-26 PROCEDURE — 99152 PR MOD CONSCIOUS SEDATION, SAME PHYS, 5+ YRS, FIRST 15 MIN: ICD-10-PCS | Mod: ,,, | Performed by: ANESTHESIOLOGY

## 2019-08-26 PROCEDURE — 99152 MOD SED SAME PHYS/QHP 5/>YRS: CPT | Mod: ,,, | Performed by: ANESTHESIOLOGY

## 2019-08-26 PROCEDURE — 25000003 PHARM REV CODE 250: Performed by: ANESTHESIOLOGY

## 2019-08-26 RX ORDER — LIDOCAINE HYDROCHLORIDE 5 MG/ML
INJECTION, SOLUTION INFILTRATION; INTRAVENOUS
Status: DISCONTINUED | OUTPATIENT
Start: 2019-08-26 | End: 2019-08-26 | Stop reason: HOSPADM

## 2019-08-26 RX ORDER — FENTANYL CITRATE 50 UG/ML
INJECTION, SOLUTION INTRAMUSCULAR; INTRAVENOUS
Status: DISCONTINUED | OUTPATIENT
Start: 2019-08-26 | End: 2019-08-26 | Stop reason: HOSPADM

## 2019-08-26 RX ORDER — LIDOCAINE HYDROCHLORIDE 10 MG/ML
INJECTION INFILTRATION; PERINEURAL
Status: DISCONTINUED | OUTPATIENT
Start: 2019-08-26 | End: 2019-08-26 | Stop reason: HOSPADM

## 2019-08-26 RX ORDER — MIDAZOLAM HYDROCHLORIDE 1 MG/ML
INJECTION INTRAMUSCULAR; INTRAVENOUS
Status: DISCONTINUED | OUTPATIENT
Start: 2019-08-26 | End: 2019-08-26 | Stop reason: HOSPADM

## 2019-08-26 RX ORDER — SODIUM CHLORIDE 9 MG/ML
500 INJECTION, SOLUTION INTRAVENOUS CONTINUOUS
Status: DISCONTINUED | OUTPATIENT
Start: 2019-08-26 | End: 2019-08-26 | Stop reason: HOSPADM

## 2019-08-26 RX ORDER — DEXAMETHASONE SODIUM PHOSPHATE 4 MG/ML
INJECTION, SOLUTION INTRA-ARTICULAR; INTRALESIONAL; INTRAMUSCULAR; INTRAVENOUS; SOFT TISSUE
Status: DISCONTINUED | OUTPATIENT
Start: 2019-08-26 | End: 2019-08-26 | Stop reason: HOSPADM

## 2019-08-26 NOTE — DISCHARGE SUMMARY
Discharge Note  Short Stay      SUMMARY     Admit Date: 8/26/2019    Attending Physician: Maikel Millan      Discharge Physician: Maikel Millan      Discharge Date: 8/26/2019 8:44 AM    Procedure(s) (LRB):  INJECTION, STEROID, EPIDURAL, TRANSFORAMINAL APPROACH (Right)    Final Diagnosis: Lumbar radiculopathy [M54.16]  DDD (degenerative disc disease), lumbar [M51.36]    Disposition: Home or self care    Patient Instructions:   Current Discharge Medication List      CONTINUE these medications which have NOT CHANGED    Details   aspirin (ECOTRIN) 81 MG EC tablet Take 81 mg by mouth once daily.      cyanocobalamin 2000 MCG tablet Take 5,000 mcg by mouth once daily.      hydrOXYzine pamoate (VISTARIL) 50 MG Cap Take 1 capsule (50 mg total) by mouth nightly.  Qty: 90 capsule, Refills: 3    Associated Diagnoses: Insomnia, unspecified type      ibuprofen (ADVIL,MOTRIN) 400 MG tablet Take 1 tablet (400 mg total) by mouth every 6 (six) hours as needed (pain).  Qty: 30 tablet, Refills: 3      losartan (COZAAR) 50 MG tablet Take 1 tablet (50 mg total) by mouth once daily.  Qty: 90 tablet, Refills: 3      meloxicam (MOBIC) 7.5 MG tablet Take 1 tablet (7.5 mg total) by mouth daily with breakfast.  Qty: 30 tablet, Refills: 2      nebivolol (BYSTOLIC) 10 MG Tab Take 1 tablet (10 mg total) by mouth once daily.  Qty: 90 tablet, Refills: 3      saw palmetto 80 MG capsule Take 80 mg by mouth 2 (two) times daily.      vitamin D 1000 units Tab Take 2,000 mg by mouth once daily.                 Discharge Diagnosis: Lumbar radiculopathy [M54.16]  DDD (degenerative disc disease), lumbar [M51.36]  Condition on Discharge: Stable with no complications to procedure   Diet on Discharge: Same as before.  Activity: as per instruction sheet.  Discharge to: Home with a responsible adult.  Follow up: 2-4 weeks    Please call my office or pager at [phone] if experienced any weakness or loss of sensation, fever > 101.5, pain uncontrolled  with oral medications, persistent nausea/vomiting/or diarrhea, redness or drainage from the incisions, or any other worrisome concerns. If physician on call was not reached or could not communicate with our office for any reason please go to the nearest emergency department

## 2019-08-26 NOTE — DISCHARGE INSTRUCTIONS
Thank you for allowing us to care for you today. You may receive a survey about the care we provided. Your feedback is valuable and helps us provide excellent care throughout the community.     Home Care Instructions for Pain Management:    1. DIET:   You may resume your normal diet today.   2. BATHING:   You may shower with luke warm water. No tub baths or anything that will soak injection sites under water for the next 24 hours.  3. DRESSING:   You may remove your bandage today.   4. ACTIVITY LEVEL:   You may resume your normal activities 24 hrs after your procedure. Nothing strenuous today.  5. MEDICATIONS:   You may resume your normal medications today. To restart blood thinners, ask your doctor.  6. DRIVING    If you have received any sedatives by mouth today, you may not drive for 12 hours.    If you have received any sedation through your IV, you may not drive for 24 hrs.   7. SPECIAL INSTRUCTIONS:   No heat to the injection site for 24 hrs including, hot bath or shower, heating pad, moist heat, or hot tubs.    Use ice pack to injection site for any pain or discomfort.  Apply ice packs for 20 minute intervals as needed.    IF you have diabetes, be sure to monitor your blood sugar more closely. IF your injection contained steroids your blood sugar levels may become higher than normal.    If you are still having pain upon discharge:  Your pain may improve over the next 48 hours. The anesthetic (numbing medication) works immediately to 48 hours. IF your injection contained a steroid (anti-inflammatory medication), it takes approximately 3 days to start feeling relief and 7-10 days to see your greatest results from the medication. It is possible you may need subsequent injections. This would be discussed at your follow up appointment with pain management or your referring doctor.    Please call my office or pager at [phone] if experienced any weakness or loss of sensation, fever > 101.5, pain uncontrolled  with oral medications, persistent nausea/vomiting/or diarrhea, redness or drainage from the incisions, or any other worrisome concerns. If physician on call was not reached or could not communicate with our office for any reason please go to the nearest emergency department.  Adult Procedural Sedation Instructions    Recovery After Procedural Sedation (Adult)  You have been given medicine by vein to make you sleep during your surgery. This may have included both a pain medicine and sleeping medicine. Most of the effects have worn off. But you may still have some drowsiness for the next 6 to 8 hours.  Home care  Follow these guidelines when you get home:  · For the next 8 hours, you should be watched by a responsible adult. This person should make sure your condition is not getting worse.  · Don't drink any alcohol for the next 24 hours.  · Don't drive, operate dangerous machinery, or make important business or personal decisions during the next 24 hours.  Note: Your healthcare provider may tell you not to take any medicine by mouth for pain or sleep in the next 4 hours. These medicines may react with the medicines you were given in the hospital. This could cause a much stronger response than usual.  Follow-up care  Follow up with your healthcare provider if you are not alert and back to your usual level of activity within 12 hours.  When to seek medical advice  Call your healthcare provider right away if any of these occur:  · Drowsiness gets worse  · Weakness or dizziness gets worse  · Repeated vomiting  · You can't be awakened   Date Last Reviewed: 10/18/2016  © 8950-8234 [employer]. [address] [place] [address], [place] [address]. All rights reserved. This information is not intended as a substitute for professional medical care. Always follow your healthcare professional's instructions.

## 2019-08-26 NOTE — OP NOTE
Patient Name: Maegan Cleary  MRN: [id number]    INFORMED CONSENT: The procedure, risks, benefits and options were discussed with patient. There are no contraindications to the procedure. The patient expressed understanding and agreed to proceed. The personnel performing the procedure was discussed. I verify that I personally obtained Maegan's consent prior to the start of the procedure and the signed consent can be found on the patient's chart.    Procedure Date: 08/26/2019    Anesthesia: Topical    Pre Procedure diagnosis: Lumbar radiculopathy [M54.16]  DDD (degenerative disc disease), lumbar [M51.36]  1. Spinal stenosis of lumbar region with neurogenic claudication    2. DDD (degenerative disc disease), lumbar    3. Chronic pain      Post-Procedure diagnosis: SAME    Sedation: Yes - Fentanyl 100 mcg and Midazolam 2 mg    PROCEDURE:Right L4-5 and L5-S1   TRANSFORAMINAL EPIDURAL STEROID INJECTION        DESCRIPTION OF PROCEDURE: The patient was brought to the procedure room. After performing time out IV access was obtained prior to the procedure. The patient was positioned prone on the fluoroscopy table. Continuous hemodynamic monitoring was initiated including blood pressure, EKG, and pulse oximetry. . The skin was prepped with chlorhexidine three times and draped in a sterile fashion. Skin anesthesia was achieved using 3 mL of lidocaine 1% over the respective injection site.     An oblique fluoroscopic view was obtained, with the superior articular process of the inferior vertebral body aligned with the pedicle. The tip of a 22-gauge 5-inch Quincke-type spinal needle was advanced toward the 6 oclock position of the pedicle under intermittent fluoroscopic guidance. Confirmation of proper needle position was made with AP, oblique, and lateral fluoroscopic views. Negative aspiration for blood or CSF was confirmed. 2 mL of Omnipaque 300 was injected. Live fluoroscopic imaging revealed a clear outline of the spinal nerve  with proximal spread of agent through the neural foramen into the anterior epidural space. A total combination of 3 mL of Lidocaine 0.5% and 5 mg decadron was injected at each level. Contrast spread was noted from L3 to L5 level. There was no pain on injection. The needle was removed and bleeding was nil.  A sterile dressing was applied. Maegan was taken back to the recovery room for further observation.     Blood Loss: Nill  Specimen: None    Maikel Millan MD

## 2019-08-27 ENCOUNTER — PATIENT MESSAGE (OUTPATIENT)
Dept: FAMILY MEDICINE | Facility: CLINIC | Age: 74
End: 2019-08-27

## 2019-08-27 ENCOUNTER — TELEPHONE (OUTPATIENT)
Dept: FAMILY MEDICINE | Facility: CLINIC | Age: 74
End: 2019-08-27

## 2019-08-27 RX ORDER — GABAPENTIN 300 MG/1
300 CAPSULE ORAL 3 TIMES DAILY
Qty: 270 CAPSULE | Refills: 3 | Status: SHIPPED | OUTPATIENT
Start: 2019-08-27 | End: 2019-10-28 | Stop reason: CLARIF

## 2019-08-27 NOTE — TELEPHONE ENCOUNTER
Pt came to the office upset due his Gabapentin not be filled. Pt stated that he needed a Prior Authorization and it was not done. Pt stated that he has been corresponding with Anh in Dr. Thomason's office and Anh stated that she did not see the Gabapentin refill by Dr. Thomason. Pt stated that the Gabapentin and Ibuprofen was on his AVS and he told Anh this. Pt stated he was hurt because of the lack of communication between Anh and Dr. Thomason. Informed pt that Dr. Thomason is on vacation this week and Anh is out of the office today. Informed pt that Anh may have already done the PA and it is probably pending. It does take 24-72 hours for a response which is sent to the pt and physician. Unfortunately, I would not be able to see the PA if it was already initiated by another co-worker. Pt stated that Dr. Manrique refilled his Gabapentin today, but still needed a PA. Offered to assist the pt and informed him that I would do the PA for the Gabapentin and personally call him once it is done. Spoke to Melody who stated that she had done the PA and it was pending approval. Melody left a message for a CB to inform pt that the PA had been done, but had to leave a message.

## 2019-08-27 NOTE — TELEPHONE ENCOUNTER
Called pt to let him know that his PA was done for his gabapentin and that it would take up to 72 hrs for a response, there was no answer so a message requesting a call back was left.

## 2019-08-28 ENCOUNTER — TELEPHONE (OUTPATIENT)
Dept: FAMILY MEDICINE | Facility: CLINIC | Age: 74
End: 2019-08-28

## 2019-08-28 NOTE — TELEPHONE ENCOUNTER
----- Message from Leola Dias sent at 8/28/2019  3:40 PM CDT -----  Contact: Dr. Cleary [phone]  Patient requesting to speak with you regarding a prior authorization. Please advise.

## 2019-08-28 NOTE — TELEPHONE ENCOUNTER
Spoke to pt and informed him that the PA has been approved for the Gabapentin and he has to call the pharmacy regarding . Informed pt that his pharmacy has to re-run the prescription for the approval. Pt verbalized understanding.

## 2019-08-29 ENCOUNTER — PATIENT MESSAGE (OUTPATIENT)
Dept: PAIN MEDICINE | Facility: CLINIC | Age: 74
End: 2019-08-29

## 2019-09-17 ENCOUNTER — PATIENT OUTREACH (OUTPATIENT)
Dept: ADMINISTRATIVE | Facility: OTHER | Age: 74
End: 2019-09-17

## 2019-09-19 ENCOUNTER — OFFICE VISIT (OUTPATIENT)
Dept: PAIN MEDICINE | Facility: CLINIC | Age: 74
End: 2019-09-19
Attending: ANESTHESIOLOGY
Payer: MEDICARE

## 2019-09-19 VITALS
SYSTOLIC BLOOD PRESSURE: 124 MMHG | RESPIRATION RATE: 18 BRPM | HEIGHT: 72 IN | BODY MASS INDEX: 28.58 KG/M2 | WEIGHT: 211 LBS | HEART RATE: 65 BPM | TEMPERATURE: 98 F | DIASTOLIC BLOOD PRESSURE: 81 MMHG

## 2019-09-19 DIAGNOSIS — M48.062 SPINAL STENOSIS OF LUMBAR REGION WITH NEUROGENIC CLAUDICATION: ICD-10-CM

## 2019-09-19 DIAGNOSIS — M51.36 DDD (DEGENERATIVE DISC DISEASE), LUMBAR: ICD-10-CM

## 2019-09-19 DIAGNOSIS — M47.819 SPONDYLOSIS WITHOUT MYELOPATHY: ICD-10-CM

## 2019-09-19 DIAGNOSIS — M70.61 TROCHANTERIC BURSITIS OF RIGHT HIP: Primary | ICD-10-CM

## 2019-09-19 PROCEDURE — 3074F PR MOST RECENT SYSTOLIC BLOOD PRESSURE < 130 MM HG: ICD-10-PCS | Mod: S$GLB,,, | Performed by: ANESTHESIOLOGY

## 2019-09-19 PROCEDURE — 20611 DRAIN/INJ JOINT/BURSA W/US: CPT | Mod: RT,S$GLB,, | Performed by: ANESTHESIOLOGY

## 2019-09-19 PROCEDURE — 1101F PT FALLS ASSESS-DOCD LE1/YR: CPT | Mod: S$GLB,,, | Performed by: ANESTHESIOLOGY

## 2019-09-19 PROCEDURE — 1101F PR PT FALLS ASSESS DOC 0-1 FALLS W/OUT INJ PAST YR: ICD-10-PCS | Mod: S$GLB,,, | Performed by: ANESTHESIOLOGY

## 2019-09-19 PROCEDURE — 99999 PR PBB SHADOW E&M-EST. PATIENT-LVL III: CPT | Mod: PBBFAC,,, | Performed by: ANESTHESIOLOGY

## 2019-09-19 PROCEDURE — 99213 OFFICE O/P EST LOW 20 MIN: CPT | Mod: 25,GC,S$GLB, | Performed by: ANESTHESIOLOGY

## 2019-09-19 PROCEDURE — 99213 PR OFFICE/OUTPT VISIT, EST, LEVL III, 20-29 MIN: ICD-10-PCS | Mod: 25,GC,S$GLB, | Performed by: ANESTHESIOLOGY

## 2019-09-19 PROCEDURE — 3074F SYST BP LT 130 MM HG: CPT | Mod: S$GLB,,, | Performed by: ANESTHESIOLOGY

## 2019-09-19 PROCEDURE — 3079F DIAST BP 80-89 MM HG: CPT | Mod: S$GLB,,, | Performed by: ANESTHESIOLOGY

## 2019-09-19 PROCEDURE — 3079F PR MOST RECENT DIASTOLIC BLOOD PRESSURE 80-89 MM HG: ICD-10-PCS | Mod: S$GLB,,, | Performed by: ANESTHESIOLOGY

## 2019-09-19 PROCEDURE — 99999 PR PBB SHADOW E&M-EST. PATIENT-LVL III: ICD-10-PCS | Mod: PBBFAC,,, | Performed by: ANESTHESIOLOGY

## 2019-09-19 PROCEDURE — 20611 PR DRAIN/ASP/INJECT MAJOR JOINT/BURSA W/US GUIDANCE: ICD-10-PCS | Mod: RT,S$GLB,, | Performed by: ANESTHESIOLOGY

## 2019-09-19 NOTE — PROGRESS NOTES
Chronic patient Established Note (Follow up visit)        HPI:  Maegan Cleary presents to the clinic for the evaluation of back pain. The pain started 8 weeks ago following non related injury and symptoms have been worsening.The pain is located in the lower back area and radiates to the right legs.  The pain is described as burning, sharp, shooting, tight band and intermittent and is rated as 7/10. The pain is rated with a score of  4/10 on the BEST day and a score of 9/10 on the WORST day.  Symptoms interfere with daily activity. The pain is exacerbated by Standing, Bending, Morning, Lifting and Getting out of bed/chair.  The pain is mitigated by ice and massage. He reports spending 0 hours per day reclining. The patient reports 7 hours of uninterrupted sleep per night. Pain radiates as far as right ankle down the back of the leg. When it happens he feels unstable and that he has to wait for it to pass. Patient states he was prescribed gabapentin but that his insurer denied it so he never took it. Additionally, he was on gabapentin many years ago for an ulnar neuropathy but that the medication did not help him.     Patient denies night fever/night sweats, urinary incontinence, bowel incontinence, significant weight loss, significant motor weakness and loss of sensations.    Interval History 9/19/19  SUBJECTIVE:    Maegan Cleary presents to the clinic for a follow-up appointment for back pain and radiculopathy. Since the last visit, Maegan Cleary states the pain has been improving. On 8/26/19 he received a right L4/L5 and L5/S1 TFESI with 70% relief of his back pain and radiculopathy. He states he feels great today, and his daily functional capacity has improved post-procedure. He still has isolated, throbbing pain over his right hip joint localized to his GTB.  Current pain intensity is 3/10. He continue to take Mobic and Gabapentin, which he feels are helping. He is also doing daily exercises he learned in  physical therapy which helps.     Pain Disability Index Review:  Last 3 PDI Scores 9/19/2019 8/19/2019   Pain Disability Index (PDI) 0 14       Pain Medications:    - Adjuvant Medications: Advil,Motrin ( Ibuprofen), Mobic (Meloxicam) and Neurontin (Gabapentin)    Opioid Contract: no     report:  Not applicable    Pain Procedures:   8/26/19-TFESI    Physical Therapy/Home Exercise: yes    Imaging:      Allergies: Review of patient's allergies indicates:  No Known Allergies    Current Medications:   Current Outpatient Medications   Medication Sig Dispense Refill    aspirin (ECOTRIN) 81 MG EC tablet Take 81 mg by mouth once daily.      cyanocobalamin 2000 MCG tablet Take 5,000 mcg by mouth once daily.      gabapentin (NEURONTIN) 300 MG capsule Take 1 capsule (300 mg total) by mouth 3 (three) times daily. 270 capsule 3    hydrOXYzine pamoate (VISTARIL) 50 MG Cap Take 1 capsule (50 mg total) by mouth nightly. 90 capsule 3    ibuprofen (ADVIL,MOTRIN) 400 MG tablet Take 1 tablet (400 mg total) by mouth every 6 (six) hours as needed (pain). 30 tablet 3    losartan (COZAAR) 50 MG tablet Take 1 tablet (50 mg total) by mouth once daily. 90 tablet 3    meloxicam (MOBIC) 7.5 MG tablet Take 1 tablet (7.5 mg total) by mouth daily with breakfast. 30 tablet 2    nebivolol (BYSTOLIC) 10 MG Tab Take 1 tablet (10 mg total) by mouth once daily. 90 tablet 3    saw palmetto 80 MG capsule Take 80 mg by mouth 2 (two) times daily.      vitamin D 1000 units Tab Take 2,000 mg by mouth once daily.       No current facility-administered medications for this visit.        REVIEW OF SYSTEMS:    GENERAL:  No weight loss, malaise or fevers.  HEENT:  Negative for frequent or significant headaches.  NECK:  Negative for lumps, goiter, pain and significant neck swelling.  RESPIRATORY:  Negative for cough, wheezing or shortness of breath.  CARDIOVASCULAR:  Negative for chest pain, leg swelling or palpitations.  GI:  Negative for abdominal  discomfort, blood in stools or black stools or change in bowel habits.  MUSCULOSKELETAL:  See HPI.  SKIN:  Negative for lesions, rash, and itching.  PSYCH:  Negative for sleep disturbance, mood disorder and recent psychosocial stressors.  HEMATOLOGY/LYMPHOLOGY:  Negative for prolonged bleeding, bruising easily or swollen nodes.  NEURO:   No history of headaches, syncope, paralysis, seizures or tremors.  All other reviewed and negative other than HPI.    Past Medical History:  Past Medical History:   Diagnosis Date    Hyperlipidemia     Hypertension     Insomnia     CRIS (obstructive sleep apnea)     Vitamin D deficiency        Past Surgical History:  Past Surgical History:   Procedure Laterality Date    BASAL CELL CARCINOMA EXCISION      cataract surgery      INJECTION, STEROID, EPIDURAL, TRANSFORAMINAL APPROACH Right 8/26/2019    Performed by [facility]ikel [facility]an [facility] at [facility]       Family History:  Family History   Problem Relation Age of Onset    Prostate cancer Brother         prostate problems, unknown cancer    Heart attack Brother 72    Hypertension Brother        Social History:  Social History     Socioeconomic History    Marital status:      Spouse name: Not on file    Number of children: Not on file    Years of education: Not on file    Highest education level: Not on file   Occupational History    Occupation: primary care physician, [employer]   Social Needs    Financial resource strain: Not hard at all    Food insecurity:     Worry: Never true     Inability: Never true    Transportation needs:     Medical: No     Non-medical: No   Tobacco Use    Smoking status: Former Smoker   Substance and Sexual Activity    Alcohol use: No     Frequency: Monthly or less     Drinks per session: 1 or 2     Binge frequency: Never    Drug use: No    Sexual activity: Not on file   Lifestyle    Physical activity:     Days per week: 0 days     Minutes per session: Not on file     Stress: Not at all   Relationships    Social connections:     Talks on phone: More than three times a week     Gets together: Twice a week     Attends Congregation service: Not on file     Active member of club or organization: Yes     Attends meetings of clubs or organizations: More than 4 times per year     Relationship status:    Other Topics Concern    Not on file   Social History Narrative    Regular exercise .  Healthy diet       OBJECTIVE:    /81   Pulse 65   Temp 98.4 °F (36.9 °C)   Resp 18   Ht 6' (1.829 m)   Wt 95.7 kg (211 lb)   BMI 28.62 kg/m²     PHYSICAL EXAMINATION:    General appearance: Well appearing, in no acute distress, alert and oriented x3.  Psych:  Mood and affect appropriate.  Skin: Skin color, texture, turgor normal, no rashes or lesions, in both upper and lower body.  Head/face:  Atraumatic, normocephalic. No palpable lymph nodes  Neck: No pain to palpation over the cervical paraspinous muscles. Spurling Negative. No pain with neck flexion, extension, or lateral flexion. .  Cor: RRR  Pulm: CTA  GI: Abdomen soft and non-tender.  Back: Straight leg raising in the sitting and supine positions is negative to radicular pain. No pain to palpation over the spine or costovertebral angles. Normal range of motion without pain reproduction.  Extremities: Peripheral joint ROM is full and pain free without obvious instability or laxity in all four extremities. No deformities, edema, or skin discoloration. Good capillary refill.  Musculoskeletal: Point TTP over the right GTB. RAD/FADIR negative bilaterally. Shoulder, hip, sacroiliac and knee provocative maneuvers are negative. Bilateral upper and lower extremity strength is normal and symmetric.  No atrophy or tone abnormalities are noted.  Neuro: Bilateral upper and lower extremity coordination and muscle stretch reflexes are physiologic and symmetric.  Plantar response are downgoing. No loss of sensation is noted.  Gait:  Normal.    ASSESSMENT: 74 y.o. year old male with chronic back and hip pain, consistent with     1. Trochanteric bursitis of right hip     2. Spinal stenosis of lumbar region with neurogenic claudication     3. DDD (degenerative disc disease), lumbar     4. Spondylosis without myelopathy           PLAN:     - I have stressed the importance of physical activity and a home exercise plan to help with pain and improve health.  - Patient can continue with medications for now since they are providing benefits, using them appropriately, and without side effects.  - Patient has gotten 70% relief from his prior epidural. If his radicular pain returns, the patient can call to repeat right L4/L5 and right L5/S1 TFESI  - Right GTB injection performed today in clinic for bursitis.   - RTC 6-8 weeks  - Counseled patient regarding the importance of activity modification, constant sleeping habits and physical therapy.    The above plan and management options were discussed at length with patient. Patient is in agreement with the above and verbalized understanding.    Flo Daley MD  [facility]  CA-2    I have personally reviewed the history and exam of this patient and agree with the resident/fellow/NPs note as stated above.    Patient Name: Maegan Cleary  MRN: [id number]    INFORMED CONSENT: The procedure, risks, benefits and options were discussed with patient. There are no contraindications to the procedure. The patient expressed understanding and agreed to proceed. The personnel performing the procedure was discussed. I verify that I personally obtained Maegan's consent prior to the start of the procedure and the signed consent can be found on the patient's chart.      Anesthesia: Topical    Pre Procedure diagnosis:   1. Trochanteric bursitis of right hip    2. Spinal stenosis of lumbar region with neurogenic claudication    3. DDD (degenerative disc disease), lumbar    4. Spondylosis without myelopathy   "      Post-Procedure diagnosis: same      Sedation: None    PROCEDURE: right GREATER TROCHANTERIC BURSA INJECTION under ultrasound guidance      DESCRIPTION OF PROCEDURE: The patient was brought to the procedure room. . The patient was positioned left lateral position on table. . . The skin overlying the rt greater trochanter was prepped with povidone-iodine three times and draped in a sterile fashion.  A  21 gauge 4." simplex needle was slowly advanced through under ultrasound guidance into the rt greater trochanteric bursa. . Negative aspiration was confirmed. . A combination of 5 cc of Bupivacaine 0.25% and 40 mg kenalog was easily injected. The needle was removed and bleeding was nil. A sterile dressing was applied. Patient tolerated procedure with no complications     Blood Loss: Nill  Specimen: None    Maikel Millan MD    09/19/2019    "

## 2019-09-29 PROBLEM — G89.29 CHRONIC PAIN: Status: RESOLVED | Noted: 2019-08-26 | Resolved: 2019-09-29

## 2019-10-18 ENCOUNTER — OFFICE VISIT (OUTPATIENT)
Dept: SURGERY | Facility: CLINIC | Age: 74
End: 2019-10-18
Attending: COLON & RECTAL SURGERY
Payer: MEDICARE

## 2019-10-18 ENCOUNTER — PATIENT OUTREACH (OUTPATIENT)
Dept: ADMINISTRATIVE | Facility: OTHER | Age: 74
End: 2019-10-18

## 2019-10-18 VITALS
BODY MASS INDEX: 27.35 KG/M2 | SYSTOLIC BLOOD PRESSURE: 149 MMHG | HEART RATE: 57 BPM | WEIGHT: 201.94 LBS | DIASTOLIC BLOOD PRESSURE: 75 MMHG | HEIGHT: 72 IN

## 2019-10-18 DIAGNOSIS — K60.2 ANAL FISSURE: Primary | ICD-10-CM

## 2019-10-18 PROCEDURE — 99999 PR PBB SHADOW E&M-EST. PATIENT-LVL III: ICD-10-PCS | Mod: PBBFAC,,, | Performed by: COLON & RECTAL SURGERY

## 2019-10-18 PROCEDURE — 1101F PR PT FALLS ASSESS DOC 0-1 FALLS W/OUT INJ PAST YR: ICD-10-PCS | Mod: S$GLB,,, | Performed by: COLON & RECTAL SURGERY

## 2019-10-18 PROCEDURE — 3077F SYST BP >= 140 MM HG: CPT | Mod: S$GLB,,, | Performed by: COLON & RECTAL SURGERY

## 2019-10-18 PROCEDURE — 3078F PR MOST RECENT DIASTOLIC BLOOD PRESSURE < 80 MM HG: ICD-10-PCS | Mod: S$GLB,,, | Performed by: COLON & RECTAL SURGERY

## 2019-10-18 PROCEDURE — 99999 PR PBB SHADOW E&M-EST. PATIENT-LVL III: CPT | Mod: PBBFAC,,, | Performed by: COLON & RECTAL SURGERY

## 2019-10-18 PROCEDURE — 3077F PR MOST RECENT SYSTOLIC BLOOD PRESSURE >= 140 MM HG: ICD-10-PCS | Mod: S$GLB,,, | Performed by: COLON & RECTAL SURGERY

## 2019-10-18 PROCEDURE — 1101F PT FALLS ASSESS-DOCD LE1/YR: CPT | Mod: S$GLB,,, | Performed by: COLON & RECTAL SURGERY

## 2019-10-18 PROCEDURE — 99203 OFFICE O/P NEW LOW 30 MIN: CPT | Mod: S$GLB,,, | Performed by: COLON & RECTAL SURGERY

## 2019-10-18 PROCEDURE — 99203 PR OFFICE/OUTPT VISIT, NEW, LEVL III, 30-44 MIN: ICD-10-PCS | Mod: S$GLB,,, | Performed by: COLON & RECTAL SURGERY

## 2019-10-18 PROCEDURE — 3078F DIAST BP <80 MM HG: CPT | Mod: S$GLB,,, | Performed by: COLON & RECTAL SURGERY

## 2019-10-18 NOTE — PROGRESS NOTES
CRS Office Visit History and Physical      SUBJECTIVE:     Chief Complaint:  Anal pain    History of Present Illness:  The patient is new patient to this practice.  He is a primary care physician.  Course is as follows:  Patient is a 74 y.o. male presents with intermittent anal pain with bowel movements for the last few months.  This is associated with occasional bright red blood on toilet paper.  He states that he has 1-2 bowel movements per day and that they are soft and easy to pass.  He follows a high-fiber diet.  Denies sitting on the toilet for long periods of time or straining.  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  Steroids did not give him any improvement.  He did self prescribed diltiazem ointment, but has been using it once a day for the last 3 days.  Has not noticed a difference yet.  Last colonoscopy was 3 years ago, has had polyps in the past.  He has a son who had colon cancer at 40 years old, currently in remission.      Review of patient's allergies indicates:  No Known Allergies    Past Medical History:   Diagnosis Date    Hyperlipidemia     Hypertension     Insomnia     CRIS (obstructive sleep apnea)     Vitamin D deficiency      Past Surgical History:   Procedure Laterality Date    BASAL CELL CARCINOMA EXCISION      cataract surgery      TRANSFORAMINAL EPIDURAL INJECTION OF STEROID Right 8/26/2019    Procedure: INJECTION, STEROID, EPIDURAL, TRANSFORAMINAL APPROACH;  Surgeon: Maikel Millan MD;  Location: [facility];  Service: Pain Management;  Laterality: Right;  RT TF JOSE MARIA L4-5, LF-S1  *add Monday per Dr Millan     Family History   Problem Relation Age of Onset    Prostate cancer Brother         prostate problems, unknown cancer    Heart attack Brother 72    Hypertension Brother      Social History     Tobacco Use    Smoking status: Former Smoker   Substance Use Topics    Alcohol use: No     Frequency: Monthly or less     Drinks per session:  1 or 2     Binge frequency: Never    Drug use: No        Review of Systems:  ROS    OBJECTIVE:     Vital Signs (Most Recent)  BP (!) 149/75 (BP Location: Left arm, Patient Position: Sitting, BP Method: Large (Automatic))   Pulse (!) 57   Ht 6' (1.829 m)   Wt 91.6 kg (201 lb 15.1 oz)   BMI 27.39 kg/m²     Physical Exam:  General: White male in no distress   Neuro: Alert and oriented x 4.  Moves all extremities.     HEENT: No icterus.  Trachea midline  Respiratory: Respirations are even and unlabored  Cardiac: Regular rate  Extremities: Warm dry and intact  Skin: No rashes    Anorectal:   External exam:  Very small thrombosed external hemorrhoid in the left posterior position, acute appearing fissure just to the right of the posterior midline with sentinel pile      ASSESSMENT/PLAN:     Diagnoses and all orders for this visit:    Anal fissure      The patient was instructed to:  We reviewed use of diltiazem ointment, instructed to place a pea-sized amount over the internal sphincter 3 times a day  Okay to continue lidocaine cream and soaks  Increase water intake to at least 8-10 glasses of water per day.  Take a daily fiber supplement (Konsyl, Benefiber, Metamucil) and increase dietary intake to 20-30 grams/day.  Avoid straining or spending >5min/event with bowel movements.  Follow-up in clinic in 3-4 weeks.  Would plan for exam under anesthesia, possible biopsy, possible sphincterotomy if no improvement at that time    Allison Mina MD  Staff Surgeon, Colon and Rectal Surgery  [facility]

## 2019-10-27 ENCOUNTER — PATIENT MESSAGE (OUTPATIENT)
Dept: SPINE | Facility: CLINIC | Age: 74
End: 2019-10-27

## 2019-10-28 ENCOUNTER — PATIENT OUTREACH (OUTPATIENT)
Dept: ADMINISTRATIVE | Facility: OTHER | Age: 74
End: 2019-10-28

## 2019-10-28 ENCOUNTER — TELEPHONE (OUTPATIENT)
Dept: SURGERY | Facility: CLINIC | Age: 74
End: 2019-10-28

## 2019-10-28 ENCOUNTER — OFFICE VISIT (OUTPATIENT)
Dept: SURGERY | Facility: CLINIC | Age: 74
End: 2019-10-28
Payer: MEDICARE

## 2019-10-28 ENCOUNTER — HOSPITAL ENCOUNTER (OUTPATIENT)
Dept: CARDIOLOGY | Facility: CLINIC | Age: 74
Discharge: HOME | End: 2019-10-28
Payer: MEDICARE

## 2019-10-28 DIAGNOSIS — K60.2 ANAL FISSURE: Primary | ICD-10-CM

## 2019-10-28 DIAGNOSIS — K60.2 ANAL FISSURE: ICD-10-CM

## 2019-10-28 PROCEDURE — 99999 PR PBB SHADOW E&M-EST. PATIENT-LVL III: CPT | Mod: PBBFAC,,, | Performed by: COLON & RECTAL SURGERY

## 2019-10-28 PROCEDURE — 99999 PR PBB SHADOW E&M-EST. PATIENT-LVL III: ICD-10-PCS | Mod: PBBFAC,,, | Performed by: COLON & RECTAL SURGERY

## 2019-10-28 PROCEDURE — 99213 OFFICE O/P EST LOW 20 MIN: CPT | Mod: S$GLB,,, | Performed by: COLON & RECTAL SURGERY

## 2019-10-28 PROCEDURE — 93010 ELECTROCARDIOGRAM REPORT: CPT | Mod: S$GLB,,, | Performed by: INTERNAL MEDICINE

## 2019-10-28 PROCEDURE — 93005 EKG 12-LEAD: ICD-10-PCS | Mod: S$GLB,,, | Performed by: COLON & RECTAL SURGERY

## 2019-10-28 PROCEDURE — 99213 PR OFFICE/OUTPT VISIT, EST, LEVL III, 20-29 MIN: ICD-10-PCS | Mod: S$GLB,,, | Performed by: COLON & RECTAL SURGERY

## 2019-10-28 PROCEDURE — 93010 EKG 12-LEAD: ICD-10-PCS | Mod: S$GLB,,, | Performed by: INTERNAL MEDICINE

## 2019-10-28 PROCEDURE — 1101F PR PT FALLS ASSESS DOC 0-1 FALLS W/OUT INJ PAST YR: ICD-10-PCS | Mod: S$GLB,,, | Performed by: COLON & RECTAL SURGERY

## 2019-10-28 PROCEDURE — 93005 ELECTROCARDIOGRAM TRACING: CPT | Mod: S$GLB,,, | Performed by: COLON & RECTAL SURGERY

## 2019-10-28 PROCEDURE — 1101F PT FALLS ASSESS-DOCD LE1/YR: CPT | Mod: S$GLB,,, | Performed by: COLON & RECTAL SURGERY

## 2019-10-28 NOTE — LETTER
October 31, 2019      Tobin Thomason MD  [address]  [address]  [place] [address]           [facility]  [address]  [facility] [address]  Phone: [phone]          Patient: Maegan Cleary   MR Number: [id number]   YOB: 1945   Date of Visit: 10/28/2019       Dear Dr. Tobin Thomason:    Thank you for referring Maegan Cleary to me for evaluation. Attached you will find relevant portions of my assessment and plan of care.    If you have questions, please do not hesitate to call me. I look forward to following Maegan Cleary along with you.    Sincerely,    Martell Ceja MD    Enclosure  CC:  No Recipients    If you would like to receive this communication electronically, please contact [email or web address] or [phone] to request more information on My [employer] access.    For providers and/or their staff who would like to refer a patient to [facility], please contact us through our one-stop-shop provider referral line, [facility], at [phone].    If you feel you have received this communication in error or would no longer like to receive these types of communications, please e-mail [email or web address]

## 2019-10-28 NOTE — TELEPHONE ENCOUNTER
Spoke with phone staff in regards to patient having pain and needed to be seen today.  Informed phone staff that the patient can be seen today by Dr. Ceja at 1:30.

## 2019-10-31 NOTE — PROGRESS NOTES
Subjective:       Patient ID: Maegan Cleary is a 74 y.o. male.    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  On exam he had a very small thrombosed external hemorrhoid in the left posterior position and an acute appearing fissure just to the right of the posterior midline with sentinel pile.  At that point, he was continued on a conservative treatment plans, increasing diltiazem to 3x/day, with plans to follow-up in 3-4 weeks.    He comes into today b/c he thought he had an associated abscess, prescribed himself Bactrim over the weekend.  His pain persists and he has seen no improvement.  No constipation/straining.  +Bleeding (BRB) with most BM's associated with significant pain.  No F/C.     Last colonoscopy - 3 yrs ago, +hx of colon polyps  + family hx of CRC - son diagnosed at age 40.    Review of patient's allergies indicates:  No Known Allergies    Past Medical History:   Diagnosis Date    Hyperlipidemia     Hypertension     Insomnia     CRIS (obstructive sleep apnea)     Vitamin D deficiency        Past Surgical History:   Procedure Laterality Date    BASAL CELL CARCINOMA EXCISION      cataract surgery      TRANSFORAMINAL EPIDURAL INJECTION OF STEROID Right 8/26/2019    Procedure: INJECTION, STEROID, EPIDURAL, TRANSFORAMINAL APPROACH;  Surgeon: Maikel Millan MD;  Location: [facility];  Service: Pain Management;  Laterality: Right;  RT TF JOSE MARIA L4-5, LF-S1  *add Monday per Dr Millan       Current Outpatient Medications   Medication Sig Dispense Refill    aspirin (ECOTRIN) 81 MG EC tablet Take 81 mg by mouth once daily.      cyanocobalamin 2000 MCG tablet Take 5,000 mcg by mouth once daily.       diltiazem HCl (DILTIAZEM 2% CREAM) Apply topically 3 (three) times daily. Apply topically to anal area. Use a pea-sized amount.  Apply to the OUTER [place] of your anus.  Do not insert into the anus. 30 g 3    hydrOXYzine pamoate (VISTARIL) 50 MG Cap Take 1 capsule (50 mg total) by mouth nightly. 90 capsule 3    losartan (COZAAR) 50 MG tablet Take 1 tablet (50 mg total) by mouth once daily. 90 tablet 3    nebivolol (BYSTOLIC) 10 MG Tab Take 1 tablet (10 mg total) by mouth once daily. 90 tablet 3    saw palmetto 80 MG capsule Take 80 mg by mouth 2 (two) times daily.      TURMERIC ORAL Take by mouth.      vitamin D 1000 units Tab Take 2,000 mg by mouth once daily.       No current facility-administered medications for this visit.        Family History   Problem Relation Age of Onset    Prostate cancer Brother         prostate problems, unknown cancer    Heart attack Brother 72    Hypertension Brother        Social History     Socioeconomic History    Marital status:      Spouse name: Not on file    Number of children: Not on file    Years of education: Not on file    Highest education level: Not on file   Occupational History    Occupation: primary care physician, [employer]   Social Needs    Financial resource strain: Not hard at all    Food insecurity:     Worry: Never true     Inability: Never true    Transportation needs:     Medical: No     Non-medical: No   Tobacco Use    Smoking status: Former Smoker   Substance and Sexual Activity    Alcohol use: No     Frequency: Monthly or less     Drinks per session: 1 or 2     Binge frequency: Never    Drug use: No    Sexual activity: Not on file   Lifestyle    Physical activity:     Days per week: 0 days     Minutes per session: Not on file    Stress: Not at all   Relationships    Social connections:     Talks on phone: More than three times a week     Gets together: Twice a week     Attends Mandaeism service: Not on file     Active  member of club or organization: Yes     Attends meetings of clubs or organizations: More than 4 times per year     Relationship status:    Other Topics Concern    Not on file   Social History Narrative    Regular exercise .  Healthy diet       Review of Systems   Constitutional: Negative for chills and fever.   HENT: Negative for congestion and sinus pressure.    Eyes: Negative for visual disturbance.   Respiratory: Negative for cough and shortness of breath.    Cardiovascular: Negative for chest pain and palpitations.   Gastrointestinal: Positive for anal bleeding and rectal pain. Negative for abdominal distention, abdominal pain, blood in stool, constipation, diarrhea, nausea and vomiting.   Endocrine: Negative for cold intolerance and heat intolerance.   Genitourinary: Negative for dysuria and frequency.   Musculoskeletal: Negative for arthralgias and back pain.   Skin: Negative for rash.   Allergic/Immunologic: Negative for immunocompromised state.   Neurological: Negative for dizziness, light-headedness and headaches.   Psychiatric/Behavioral: Negative for confusion. The patient is not nervous/anxious.        Objective:      Physical Exam   Constitutional: He is oriented to person, place, and time. He appears well-developed and well-nourished.   HENT:   Head: Normocephalic and atraumatic.   Eyes: Conjunctivae are normal.   Neck: Normal range of motion. Neck supple.   Cardiovascular: Normal heart sounds.   Pulmonary/Chest: Effort normal. No respiratory distress.   Abdominal: Soft. Bowel sounds are normal. He exhibits no distension and no mass. There is no tenderness. There is no rebound and no guarding.   Genitourinary:   Genitourinary Comments: Perineum - normal perianal skin, no mass, +PML anal  fissure, small external hemorrhoids LPL       Neurological: He is alert and oriented to person, place, and time.   Skin: Skin is warm and dry. No erythema.         Lab Results   Component Value Date    WBC 5.50  10/28/2019    HGB 16.7 10/28/2019    HCT 50.1 10/28/2019    MCV 93 10/28/2019     10/28/2019     BMP  Lab Results   Component Value Date     10/28/2019    K 4.6 10/28/2019     10/28/2019    CO2 29 10/28/2019    BUN 16 10/28/2019    CREATININE 1.4 10/28/2019    CALCIUM 9.7 10/28/2019    ANIONGAP 6 (L) 10/28/2019    ESTGFRAFRICA 57.6 (A) 10/28/2019    EGFRNONAA 49.8 (A) 10/28/2019     CMP  Sodium   Date Value Ref Range Status   10/28/2019 137 136 - 145 mmol/L Final     Potassium   Date Value Ref Range Status   10/28/2019 4.6 3.5 - 5.1 mmol/L Final     Chloride   Date Value Ref Range Status   10/28/2019 102 95 - 110 mmol/L Final     CO2   Date Value Ref Range Status   10/28/2019 29 23 - 29 mmol/L Final     Glucose   Date Value Ref Range Status   10/28/2019 98 70 - 110 mg/dL Final     BUN, Bld   Date Value Ref Range Status   10/28/2019 16 8 - 23 mg/dL Final     Creatinine   Date Value Ref Range Status   10/28/2019 1.4 0.5 - 1.4 mg/dL Final     Calcium   Date Value Ref Range Status   10/28/2019 9.7 8.7 - 10.5 mg/dL Final     Total Protein   Date Value Ref Range Status   07/01/2019 6.8 6.0 - 8.4 g/dL Final     Albumin   Date Value Ref Range Status   07/01/2019 3.8 3.5 - 5.2 g/dL Final     Total Bilirubin   Date Value Ref Range Status   07/01/2019 0.5 0.1 - 1.0 mg/dL Final     Comment:     For infants and newborns, interpretation of results should be based  on gestational age, weight and in agreement with clinical  observations.  Premature Infant recommended reference ranges:  Up to 24 hours.............<8.0 mg/dL  Up to 48 hours............<12.0 mg/dL  3-5 days..................<15.0 mg/dL  6-29 days.................<15.0 mg/dL       Alkaline Phosphatase   Date Value Ref Range Status   07/01/2019 70 55 - 135 U/L Final     AST   Date Value Ref Range Status   07/01/2019 32 10 - 40 U/L Final     ALT   Date Value Ref Range Status   07/01/2019 34 10 - 44 U/L Final     Anion Gap   Date Value Ref Range Status    10/28/2019 6 (L) 8 - 16 mmol/L Final     eGFR if    Date Value Ref Range Status   10/28/2019 57.6 (A) >60 mL/min/1.73 m^2 Final     eGFR if non    Date Value Ref Range Status   10/28/2019 49.8 (A) >60 mL/min/1.73 m^2 Final     Comment:     Calculation used to obtain the estimated glomerular filtration  rate (eGFR) is the CKD-EPI equation.        No results found for: CEA        Assessment:       1. Anal fissure        Plan:   He has seen no improvement over the past week with conservative measures and is asking to move forward with the next step.   We discussed LIAS, but I also told him that he may not have given conservative measures enough time to work - I explained that it can sometimes take several weeks to see improvement, but he insists that he wants to proceed with surgery.  He has no problems with continence at baseline.    He is scheduled for LIAS in the OR 11/8/19.  I have discussed the procedure at length with Maegan Cleary.  We discussed the rationale, risks, benefits, and alternatives in depth.  We discussed the expected outcomes and potential complications including but not limited to bleeding, infection, recurrence, prolonged pain, need for further procedures and altered continence.  He verbalized his understanding of the procedure and wishes to proceed.  Written consent was obtained.    Martell Ceja MD, FACS, FASCRS  Senior Staff Surgeon  Department of Colon & Rectal Surgery     This note was created using voice recognition software, and may contain some unrecognized transcriptional errors.

## 2019-11-01 ENCOUNTER — TELEPHONE (OUTPATIENT)
Dept: SURGERY | Facility: CLINIC | Age: 74
End: 2019-11-01

## 2019-11-05 ENCOUNTER — OFFICE VISIT (OUTPATIENT)
Dept: FAMILY MEDICINE | Facility: CLINIC | Age: 74
End: 2019-11-05
Payer: MEDICARE

## 2019-11-05 VITALS
DIASTOLIC BLOOD PRESSURE: 86 MMHG | WEIGHT: 199.5 LBS | OXYGEN SATURATION: 97 % | TEMPERATURE: 98 F | HEART RATE: 68 BPM | HEIGHT: 72 IN | SYSTOLIC BLOOD PRESSURE: 124 MMHG | BODY MASS INDEX: 27.02 KG/M2

## 2019-11-05 DIAGNOSIS — I10 HYPERTENSION, UNSPECIFIED TYPE: ICD-10-CM

## 2019-11-05 DIAGNOSIS — R79.89 ABNORMAL BLOOD CREATININE LEVEL: ICD-10-CM

## 2019-11-05 DIAGNOSIS — Z01.818 PREOP EXAMINATION: ICD-10-CM

## 2019-11-05 DIAGNOSIS — K60.2 ANAL FISSURE: Primary | ICD-10-CM

## 2019-11-05 PROCEDURE — 99214 OFFICE O/P EST MOD 30 MIN: CPT | Mod: S$GLB,,, | Performed by: FAMILY MEDICINE

## 2019-11-05 PROCEDURE — 99214 PR OFFICE/OUTPT VISIT, EST, LEVL IV, 30-39 MIN: ICD-10-PCS | Mod: S$GLB,,, | Performed by: FAMILY MEDICINE

## 2019-11-05 PROCEDURE — 3079F PR MOST RECENT DIASTOLIC BLOOD PRESSURE 80-89 MM HG: ICD-10-PCS | Mod: S$GLB,,, | Performed by: FAMILY MEDICINE

## 2019-11-05 PROCEDURE — 1101F PR PT FALLS ASSESS DOC 0-1 FALLS W/OUT INJ PAST YR: ICD-10-PCS | Mod: S$GLB,,, | Performed by: FAMILY MEDICINE

## 2019-11-05 PROCEDURE — 3074F PR MOST RECENT SYSTOLIC BLOOD PRESSURE < 130 MM HG: ICD-10-PCS | Mod: S$GLB,,, | Performed by: FAMILY MEDICINE

## 2019-11-05 PROCEDURE — 3079F DIAST BP 80-89 MM HG: CPT | Mod: S$GLB,,, | Performed by: FAMILY MEDICINE

## 2019-11-05 PROCEDURE — 1101F PT FALLS ASSESS-DOCD LE1/YR: CPT | Mod: S$GLB,,, | Performed by: FAMILY MEDICINE

## 2019-11-05 PROCEDURE — 3074F SYST BP LT 130 MM HG: CPT | Mod: S$GLB,,, | Performed by: FAMILY MEDICINE

## 2019-11-05 PROCEDURE — 99999 PR PBB SHADOW E&M-EST. PATIENT-LVL III: ICD-10-PCS | Mod: PBBFAC,,, | Performed by: FAMILY MEDICINE

## 2019-11-05 PROCEDURE — 99999 PR PBB SHADOW E&M-EST. PATIENT-LVL III: CPT | Mod: PBBFAC,,, | Performed by: FAMILY MEDICINE

## 2019-11-05 NOTE — PROGRESS NOTES
(Portions of this note were dictated using voice recognition software and may contain dictation related errors in spelling/grammar/syntax not found on text review)    CC:   Chief Complaint   Patient presents with    Pre-op Exam       HPI: 74 y.o. male Last seen August 2019 for lumbar radiculopathy pains.  More recently saw Colorectal surgery on 10/28 secondary to anal fissure.  Prior evaluation showed small thrombosed external hemorrhoid an acute appearing fissure.  Was put on conservative management will diltiazem topical.  Planning currently for surgical management for sphincterotomy    Surgeon:  Dr. Allison Antoine  Date: 11/7/19      .  Currently feeling well, no cardiovascular symptoms.  Had preop EKG and labs.  Did have bump in his creatinine but states that he was taking naproxen at that time, since has stopped all these medications.  Only taking Bystolic for blood pressure control.  Plans to have repeat labs in a few months just to track his creatinine which is always otherwise been.        Past Medical History:   Diagnosis Date    Hyperlipidemia     Hypertension     Insomnia     CRIS (obstructive sleep apnea)     Vitamin D deficiency        Past Surgical History:   Procedure Laterality Date    BASAL CELL CARCINOMA EXCISION      cataract surgery      TRANSFORAMINAL EPIDURAL INJECTION OF STEROID Right 8/26/2019    Procedure: INJECTION, STEROID, EPIDURAL, TRANSFORAMINAL APPROACH;  Surgeon: Maikel Millan MD;  Location: [facility], [place], operated by [facility];  Service: Pain Management;  Laterality: Right;  RT TF JOSE MARIA L4-5, LF-S1  *add Monday per Dr Millan       Family History   Problem Relation Age of Onset    Prostate cancer Brother         prostate problems, unknown cancer    Heart attack Brother 72    Hypertension Brother        Social History     Tobacco Use    Smoking status: Former Smoker   Substance Use Topics    Alcohol use: No     Frequency: Monthly or less     Drinks per session: 1 or 2     Binge  frequency: Never    Drug use: No       Lab Results   Component Value Date    WBC 5.50 10/28/2019    HGB 16.7 10/28/2019    HCT 50.1 10/28/2019    MCV 93 10/28/2019     10/28/2019    CHOL 169 02/02/2019    TRIG 208 (H) 02/02/2019    HDL 32 (L) 02/02/2019    ALT 34 07/01/2019    AST 32 07/01/2019    BILITOT 0.5 07/01/2019    ALKPHOS 70 07/01/2019     10/28/2019    K 4.6 10/28/2019     10/28/2019    CREATININE 1.4 10/28/2019    ESTGFRAFRICA 57.6 (A) 10/28/2019    EGFRNONAA 49.8 (A) 10/28/2019    CALCIUM 9.7 10/28/2019    ALBUMIN 3.8 07/01/2019    BUN 16 10/28/2019    CO2 29 10/28/2019    TSH 1.642 02/02/2019    PSA 2.23 10/13/2012    LDLCALC 95.4 02/02/2019    GLU 98 10/28/2019                 ROS:  GENERAL: No fever, chills, fatigability or weight loss.  SKIN: No rashes, no itching.  HEAD: No headaches.  EYES: No visual changes  EARS: No ear pain or changes in hearing.  NOSE: No congestion or rhinorrhea.  MOUTH & THROAT: No hoarseness, change in voice, or sore throat.  NODES: Denies swollen glands.  CHEST: Denies VALLADARES, cyanosis, wheezing, cough and sputum production.  CARDIOVASCULAR: Denies chest pain, PND, orthopnea.  ABDOMEN: No nausea, vomiting, or changes in bowel function.  URINARY: No flank pain, dysuria or hematuria.  PERIPHERAL VASCULAR: No claudication or cyanosis.  MUSCULOSKELETAL: No joint stiffness or swelling. Denies back pain.  NEUROLOGIC: No weakness or numbness.  Sciatica symptoms have resolved, not gabapentin    Vital signs reviewed  PE:   APPEARANCE: Well nourished, well developed, in no acute distress.    HEAD: Normocephalic, atraumatic.  EYES: PERRL. EOMI.   Conjunctivae noninjected.  EARS: TM's intact. Light reflex normal. No retraction or perforation  NOSE: Mucosa pink. Airway clear.  MOUTH & THROAT: No tonsillar enlargement. No pharyngeal erythema or exudate.   NECK: Supple with no cervical lymphadenopathy.  No carotid bruits.  No thyromegaly   CHEST: Good inspiratory effort.  Lungs clear to auscultation with no wheezes or crackles.  CARDIOVASCULAR: Normal S1, S2. No rubs, murmurs, or gallops.  ABDOMEN: Bowel sounds normal. Not distended. Soft. No tenderness or masses. No organomegaly.  EXTREMITIES: No edema, cyanosis, or clubbing.      IMPRESSION  1. Anal fissure    2. Preop examination    3. Abnormal blood creatinine level    4. Hypertension, unspecified type            PLAN  Only abnormality as above was elevated creatinine but patient feels like it is likely from his taking naproxen at the time along with being on ARB.  Currently off everything except for Bystolic.  Feels well overall.  Reviewed his EKG which did not show any acute issues.  At this time he is stable for upcoming sphincterotomy procedure for his anal fissure.  Medical optimization note sent to his surgeon.  Advised recheck of renal function the next 2-3 months

## 2019-11-05 NOTE — LETTER
November 5, 2019        Allison Antoine MD  [address]  [address]  [facility] [address]             [address], [address]  [facility] [address]  Phone: [phone]  Fax: [phone]   Patient: Maegan Cleary   MR Number: [id number]   YOB: 1945   Date of Visit: 11/5/2019       Dear Dr. Antoine:    Thank you for referring Maegan Cleary to me for evaluation. He is medically stable for upcoming surgery. .    If you have questions, please do not hesitate to call me. I look forward to following Maegan Cleary along with you.    Sincerely,      Tobin Thomason MD            CC  No Recipients    Enclosure

## 2019-12-03 ENCOUNTER — OFFICE VISIT (OUTPATIENT)
Dept: FAMILY MEDICINE | Facility: CLINIC | Age: 74
End: 2019-12-03
Payer: MEDICARE

## 2019-12-03 VITALS
HEIGHT: 72 IN | WEIGHT: 196 LBS | DIASTOLIC BLOOD PRESSURE: 76 MMHG | HEART RATE: 69 BPM | OXYGEN SATURATION: 97 % | BODY MASS INDEX: 26.55 KG/M2 | SYSTOLIC BLOOD PRESSURE: 128 MMHG

## 2019-12-03 DIAGNOSIS — N18.30 CKD (CHRONIC KIDNEY DISEASE) STAGE 3, GFR 30-59 ML/MIN: ICD-10-CM

## 2019-12-03 DIAGNOSIS — G47.33 OSA (OBSTRUCTIVE SLEEP APNEA): ICD-10-CM

## 2019-12-03 DIAGNOSIS — Z00.00 ENCOUNTER FOR PREVENTIVE HEALTH EXAMINATION: Primary | ICD-10-CM

## 2019-12-03 DIAGNOSIS — E66.3 OVERWEIGHT (BMI 25.0-29.9): ICD-10-CM

## 2019-12-03 DIAGNOSIS — I50.32 CHRONIC DIASTOLIC HEART FAILURE: ICD-10-CM

## 2019-12-03 DIAGNOSIS — I10 ESSENTIAL HYPERTENSION: ICD-10-CM

## 2019-12-03 DIAGNOSIS — E78.5 HYPERLIPIDEMIA, UNSPECIFIED HYPERLIPIDEMIA TYPE: ICD-10-CM

## 2019-12-03 DIAGNOSIS — G47.09 OTHER INSOMNIA: ICD-10-CM

## 2019-12-03 PROBLEM — K21.9 LARYNGOPHARYNGEAL REFLUX (LPR): Status: RESOLVED | Noted: 2019-02-25 | Resolved: 2019-12-03

## 2019-12-03 PROCEDURE — G0439 PPPS, SUBSEQ VISIT: HCPCS | Mod: S$GLB,,, | Performed by: NURSE PRACTITIONER

## 2019-12-03 PROCEDURE — 3074F PR MOST RECENT SYSTOLIC BLOOD PRESSURE < 130 MM HG: ICD-10-PCS | Mod: S$GLB,,, | Performed by: NURSE PRACTITIONER

## 2019-12-03 PROCEDURE — 3074F SYST BP LT 130 MM HG: CPT | Mod: S$GLB,,, | Performed by: NURSE PRACTITIONER

## 2019-12-03 PROCEDURE — 99999 PR PBB SHADOW E&M-EST. PATIENT-LVL IV: CPT | Mod: PBBFAC,,, | Performed by: NURSE PRACTITIONER

## 2019-12-03 PROCEDURE — 3078F PR MOST RECENT DIASTOLIC BLOOD PRESSURE < 80 MM HG: ICD-10-PCS | Mod: S$GLB,,, | Performed by: NURSE PRACTITIONER

## 2019-12-03 PROCEDURE — 99999 PR PBB SHADOW E&M-EST. PATIENT-LVL IV: ICD-10-PCS | Mod: PBBFAC,,, | Performed by: NURSE PRACTITIONER

## 2019-12-03 PROCEDURE — G0439 PR MEDICARE ANNUAL WELLNESS SUBSEQUENT VISIT: ICD-10-PCS | Mod: S$GLB,,, | Performed by: NURSE PRACTITIONER

## 2019-12-03 PROCEDURE — 3078F DIAST BP <80 MM HG: CPT | Mod: S$GLB,,, | Performed by: NURSE PRACTITIONER

## 2019-12-03 RX ORDER — LOSARTAN POTASSIUM 50 MG/1
50 TABLET ORAL DAILY
COMMUNITY
Start: 2019-01-28 | End: 2020-01-31 | Stop reason: ALTCHOICE

## 2019-12-03 RX ORDER — GABAPENTIN 300 MG/1
300 CAPSULE ORAL DAILY
Refills: 3 | COMMUNITY
Start: 2019-11-26 | End: 2020-10-20

## 2019-12-03 NOTE — PROGRESS NOTES
"Maegan Cleary presented for a  Medicare AWV and comprehensive Health Risk Assessment today. The following components were reviewed and updated:    · Medical history  · Family History  · Social history  · Allergies and Current Medications  · Health Risk Assessment  · Health Maintenance  · Care Team     Patient is also requesting a bone density scan.    ** See Completed Assessments for Annual Wellness Visit within the encounter summary.**       The following assessments were completed:  · Living Situation  · CAGE  · Depression Screening  · Timed Get Up and Go  · Whisper Test  · Cognitive Function Screening      Patient given time in Tristanian "10 to 1". (12:50)      · Nutrition Screening  · ADL Screening  · PAQ Screening    Vitals:    12/03/19 0708   BP: 128/76   BP Location: Right arm   Patient Position: Sitting   BP Method: Medium (Manual)   Pulse: 69   SpO2: 97%   Weight: 88.9 kg (195 lb 15.8 oz)   Height: 6' (1.829 m)     Body mass index is 26.58 kg/m².     Physical Exam   Constitutional: He is oriented to person, place, and time. Vital signs are normal. He appears well-developed. He is cooperative.   overweight   HENT:   Head: Normocephalic and atraumatic.   Right Ear: Hearing, tympanic membrane, external ear and ear canal normal.   Left Ear: Hearing, tympanic membrane, external ear and ear canal normal.   Nose: Nose normal.   Mouth/Throat: Uvula is midline, oropharynx is clear and moist and mucous membranes are normal.   Eyes: Pupils are equal, round, and reactive to light. Conjunctivae, EOM and lids are normal.   Neck: Trachea normal, normal range of motion and full passive range of motion without pain. Neck supple. No JVD present.   Cardiovascular: Normal rate, S1 normal, S2 normal, normal heart sounds, intact distal pulses and normal pulses. An irregular rhythm present.   No murmur heard.  Pulses:       Carotid pulses are 2+ on the right side, and 2+ on the left side.       Radial pulses are 2+ on the right " side, and 2+ on the left side.        Dorsalis pedis pulses are 2+ on the right side, and 2+ on the left side.        Posterior tibial pulses are 2+ on the right side, and 2+ on the left side.   Pulmonary/Chest: Effort normal and breath sounds normal. No respiratory distress. He has no wheezes.   Abdominal: Soft. Normal appearance and bowel sounds are normal. He exhibits no distension.   Musculoskeletal: Normal range of motion. He exhibits no edema.   Neurological: He is alert and oriented to person, place, and time. He has normal strength and normal reflexes. He exhibits normal muscle tone.   Skin: Skin is warm, dry and intact. Capillary refill takes less than 2 seconds. No erythema.   Psychiatric: He has a normal mood and affect. His speech is normal and behavior is normal. Judgment and thought content normal.   Vitals reviewed.        Diagnoses and health risks identified today and associated recommendations/orders:    1. Encounter for preventive health examination    2. Essential hypertension  Chronic; stable on medication. Follow up with PCP.    3. CKD (chronic kidney disease) stage 3, GFR 30-59 ml/min  Chronic; stable. Follow up with PCP.    4. Hyperlipidemia, unspecified hyperlipidemia type  Chronic; stable. Not on medication but patient follows low fat diet. Follow up with PCP.    5. Chronic diastolic heart failure  Chronic; stable on medication. Follow up with PCP.    6. Other insomnia  Chronic; stable. Patient says he sleeps much better with new CPAP.    7. CRIS (obstructive sleep apnea)  Chronic; stable. Uses CPAP at night. Follow up with PCP.    8. Overweight (BMI 25.0-29.9)  Discussed importance of engaging in physical activity at least 3x/week for a minimum of 30 min/day, and encouraged to continue to eat a low salt, low fat diet.      Provided Maegan with a 5-10 year written screening schedule and personal prevention plan. Recommendations were developed using the USPSTF age appropriate recommendations.  Education, counseling, and referrals were provided as needed. After Visit Summary printed and given to patient which includes a list of additional screenings\tests needed.    Follow up for next annual wellness visit.    Nayeli Bernstein NP    I offered to discuss end of life issues, including information on how to make advance directives that the patient could use to name someone who would make medical decisions on their behalf if they became too ill to make themselves.    ___Patient declined  _X_Patient will provide paperwork at next follow-up visit.

## 2019-12-03 NOTE — PATIENT INSTRUCTIONS
Counseling and Referral of Other Preventative  (Italic type indicates deductible and co-insurance are waived)    Patient Name: Maegan Cleary  Today's Date: 12/3/2019    Health Maintenance       Date Due Completion Date    Pneumococcal Vaccine (65+ Low/Medium Risk) (2 of 2 - PPSV23) 12/30/2017 12/30/2016    Eye Exam 12/17/2019 12/17/2018    Override on 12/17/2018: Done    Override on 10/26/2016: Done    Shingles Vaccine (1 of 2) 08/16/2020 (Originally 7/10/1997) ---    Lipid Panel 02/02/2024 2/2/2019    TETANUS VACCINE 01/01/2026 1/1/2016 (Done)    Override on 1/1/2016: Done    Colonoscopy 04/27/2026 4/27/2016        No orders of the defined types were placed in this encounter.    The following information is provided to all patients.  This information is to help you find resources for any of the problems found today that may be affecting your health:                Living healthy guide: www.[facility].[place].gov      Understanding Diabetes: www.diabetes.org      Eating healthy: www.cdc.gov/healthyweight      CDC home safety checklist: www.cdc.gov/steadi/patient.html      Agency on Aging: www.goea.[place].gov      Alcoholics anonymous (AA): www.aa.org      Physical Activity: www.harvey.nih.gov/ay4biln      Tobacco use: www.quitwithusla.org

## 2019-12-03 NOTE — Clinical Note
Dr. Carr,I saw Dr. Cleary today for his annual wellness visit. He is requesting to have a DEXA scan ordered. I scheduled a lab appt here at [facility] and follow-up with you for next month.Thanks,Nayeli

## 2020-01-25 ENCOUNTER — TELEPHONE (OUTPATIENT)
Dept: FAMILY MEDICINE | Facility: CLINIC | Age: 75
End: 2020-01-25

## 2020-01-25 ENCOUNTER — NURSE TRIAGE (OUTPATIENT)
Dept: ADMINISTRATIVE | Facility: CLINIC | Age: 75
End: 2020-01-25

## 2020-01-25 DIAGNOSIS — Z00.00 WELL ADULT EXAM: Primary | ICD-10-CM

## 2020-01-25 DIAGNOSIS — E78.5 HYPERLIPIDEMIA, UNSPECIFIED HYPERLIPIDEMIA TYPE: ICD-10-CM

## 2020-01-25 DIAGNOSIS — I10 ESSENTIAL HYPERTENSION: ICD-10-CM

## 2020-01-25 NOTE — TELEPHONE ENCOUNTER
Patient states he was seen by np on 12/03.  Patient reports he fasted for labs today and was told there was no orders. Patient reports he will wait to get orders from pcp.     Reason for Disposition   General information question, no triage required and triager able to answer question    Additional Information   Negative: [1] Caller is not with the adult (patient) AND [2] reporting urgent symptoms   Negative: Lab result questions   Negative: Medication questions   Negative: Caller can't be reached by phone   Negative: Caller has already spoken to PCP or another triager   Negative: RN needs further essential information from caller in order to complete triage   Negative: Requesting regular office appointment   Negative: [1] Caller requesting NON-URGENT health information AND [2] PCP's office is the best resource   Negative: Health Information question, no triage required and triager able to answer question    Protocols used: INFORMATION ONLY CALL-A-

## 2020-01-25 NOTE — TELEPHONE ENCOUNTER
Patient would like to have annual labs done prior to his appointment with Dr. Carr. Please call the patient if this can be done.

## 2020-01-31 ENCOUNTER — LAB VISIT (OUTPATIENT)
Dept: LAB | Facility: HOSPITAL | Age: 75
End: 2020-01-31
Attending: INTERNAL MEDICINE
Payer: MEDICARE

## 2020-01-31 ENCOUNTER — OFFICE VISIT (OUTPATIENT)
Dept: INTERNAL MEDICINE | Facility: CLINIC | Age: 75
End: 2020-01-31
Payer: MEDICARE

## 2020-01-31 VITALS
TEMPERATURE: 98 F | SYSTOLIC BLOOD PRESSURE: 108 MMHG | HEIGHT: 72 IN | BODY MASS INDEX: 27.53 KG/M2 | HEART RATE: 60 BPM | DIASTOLIC BLOOD PRESSURE: 66 MMHG | RESPIRATION RATE: 16 BRPM | WEIGHT: 203.25 LBS

## 2020-01-31 DIAGNOSIS — I10 ESSENTIAL HYPERTENSION: ICD-10-CM

## 2020-01-31 DIAGNOSIS — Z00.00 WELL ADULT EXAM: ICD-10-CM

## 2020-01-31 DIAGNOSIS — M85.80 OSTEOPENIA, UNSPECIFIED LOCATION: ICD-10-CM

## 2020-01-31 DIAGNOSIS — Z00.00 WELL ADULT EXAM: Primary | ICD-10-CM

## 2020-01-31 DIAGNOSIS — M51.36 LUMBAR DEGENERATIVE DISC DISEASE: ICD-10-CM

## 2020-01-31 PROBLEM — N18.30 CKD (CHRONIC KIDNEY DISEASE) STAGE 3, GFR 30-59 ML/MIN: Status: RESOLVED | Noted: 2019-12-03 | Resolved: 2020-01-31

## 2020-01-31 LAB
ALBUMIN SERPL BCP-MCNC: 3.9 G/DL (ref 3.5–5.2)
ALP SERPL-CCNC: 61 U/L (ref 55–135)
ALT SERPL W/O P-5'-P-CCNC: 19 U/L (ref 10–44)
ANION GAP SERPL CALC-SCNC: 8 MMOL/L (ref 8–16)
AST SERPL-CCNC: 18 U/L (ref 10–40)
BASOPHILS # BLD AUTO: 0.01 K/UL (ref 0–0.2)
BASOPHILS NFR BLD: 0.1 % (ref 0–1.9)
BILIRUB SERPL-MCNC: 0.7 MG/DL (ref 0.1–1)
BUN SERPL-MCNC: 14 MG/DL (ref 8–23)
CALCIUM SERPL-MCNC: 9.1 MG/DL (ref 8.7–10.5)
CHLORIDE SERPL-SCNC: 106 MMOL/L (ref 95–110)
CHOLEST SERPL-MCNC: 135 MG/DL (ref 120–199)
CHOLEST/HDLC SERPL: 3.9 {RATIO} (ref 2–5)
CO2 SERPL-SCNC: 28 MMOL/L (ref 23–29)
COMPLEXED PSA SERPL-MCNC: 1.9 NG/ML (ref 0–4)
CREAT SERPL-MCNC: 1 MG/DL (ref 0.5–1.4)
DIFFERENTIAL METHOD: ABNORMAL
EOSINOPHIL # BLD AUTO: 0.1 K/UL (ref 0–0.5)
EOSINOPHIL NFR BLD: 1.7 % (ref 0–8)
ERYTHROCYTE [DISTWIDTH] IN BLOOD BY AUTOMATED COUNT: 13.2 % (ref 11.5–14.5)
EST. GFR  (AFRICAN AMERICAN): >60 ML/MIN/1.73 M^2
EST. GFR  (NON AFRICAN AMERICAN): >60 ML/MIN/1.73 M^2
GLUCOSE SERPL-MCNC: 98 MG/DL (ref 70–110)
HCT VFR BLD AUTO: 47.7 % (ref 40–54)
HDLC SERPL-MCNC: 35 MG/DL (ref 40–75)
HDLC SERPL: 25.9 % (ref 20–50)
HGB BLD-MCNC: 15.5 G/DL (ref 14–18)
IMM GRANULOCYTES # BLD AUTO: 0.02 K/UL (ref 0–0.04)
IMM GRANULOCYTES NFR BLD AUTO: 0.3 % (ref 0–0.5)
LDLC SERPL CALC-MCNC: 69.8 MG/DL (ref 63–159)
LYMPHOCYTES # BLD AUTO: 3 K/UL (ref 1–4.8)
LYMPHOCYTES NFR BLD: 39.2 % (ref 18–48)
MCH RBC QN AUTO: 31.2 PG (ref 27–31)
MCHC RBC AUTO-ENTMCNC: 32.5 G/DL (ref 32–36)
MCV RBC AUTO: 96 FL (ref 82–98)
MONOCYTES # BLD AUTO: 0.5 K/UL (ref 0.3–1)
MONOCYTES NFR BLD: 6.3 % (ref 4–15)
NEUTROPHILS # BLD AUTO: 4 K/UL (ref 1.8–7.7)
NEUTROPHILS NFR BLD: 52.4 % (ref 38–73)
NONHDLC SERPL-MCNC: 100 MG/DL
NRBC BLD-RTO: 0 /100 WBC
PLATELET # BLD AUTO: 188 K/UL (ref 150–350)
PMV BLD AUTO: 10.7 FL (ref 9.2–12.9)
POTASSIUM SERPL-SCNC: 4.6 MMOL/L (ref 3.5–5.1)
PROT SERPL-MCNC: 7.2 G/DL (ref 6–8.4)
RBC # BLD AUTO: 4.97 M/UL (ref 4.6–6.2)
SODIUM SERPL-SCNC: 142 MMOL/L (ref 136–145)
TRIGL SERPL-MCNC: 151 MG/DL (ref 30–150)
TSH SERPL DL<=0.005 MIU/L-ACNC: 1.5 UIU/ML (ref 0.4–4)
WBC # BLD AUTO: 7.72 K/UL (ref 3.9–12.7)

## 2020-01-31 PROCEDURE — 85025 COMPLETE CBC W/AUTO DIFF WBC: CPT

## 2020-01-31 PROCEDURE — 84443 ASSAY THYROID STIM HORMONE: CPT

## 2020-01-31 PROCEDURE — 3078F PR MOST RECENT DIASTOLIC BLOOD PRESSURE < 80 MM HG: ICD-10-PCS | Mod: S$GLB,,, | Performed by: INTERNAL MEDICINE

## 2020-01-31 PROCEDURE — 80053 COMPREHEN METABOLIC PANEL: CPT

## 2020-01-31 PROCEDURE — 99999 PR PBB SHADOW E&M-EST. PATIENT-LVL IV: CPT | Mod: PBBFAC,,, | Performed by: INTERNAL MEDICINE

## 2020-01-31 PROCEDURE — 3074F SYST BP LT 130 MM HG: CPT | Mod: S$GLB,,, | Performed by: INTERNAL MEDICINE

## 2020-01-31 PROCEDURE — 99397 PR PREVENTIVE VISIT,EST,65 & OVER: ICD-10-PCS | Mod: S$GLB,,, | Performed by: INTERNAL MEDICINE

## 2020-01-31 PROCEDURE — 36415 COLL VENOUS BLD VENIPUNCTURE: CPT | Mod: PO

## 2020-01-31 PROCEDURE — 3074F PR MOST RECENT SYSTOLIC BLOOD PRESSURE < 130 MM HG: ICD-10-PCS | Mod: S$GLB,,, | Performed by: INTERNAL MEDICINE

## 2020-01-31 PROCEDURE — 3078F DIAST BP <80 MM HG: CPT | Mod: S$GLB,,, | Performed by: INTERNAL MEDICINE

## 2020-01-31 PROCEDURE — 84153 ASSAY OF PSA TOTAL: CPT

## 2020-01-31 PROCEDURE — 99397 PER PM REEVAL EST PAT 65+ YR: CPT | Mod: S$GLB,,, | Performed by: INTERNAL MEDICINE

## 2020-01-31 PROCEDURE — 80061 LIPID PANEL: CPT

## 2020-01-31 PROCEDURE — 99999 PR PBB SHADOW E&M-EST. PATIENT-LVL IV: ICD-10-PCS | Mod: PBBFAC,,, | Performed by: INTERNAL MEDICINE

## 2020-01-31 RX ORDER — LOSARTAN POTASSIUM AND HYDROCHLOROTHIAZIDE 12.5; 5 MG/1; MG/1
1 TABLET ORAL DAILY
Qty: 90 TABLET | Refills: 3 | Status: SHIPPED | OUTPATIENT
Start: 2020-01-31 | End: 2020-07-20

## 2020-01-31 RX ORDER — LOSARTAN POTASSIUM AND HYDROCHLOROTHIAZIDE 12.5; 5 MG/1; MG/1
TABLET ORAL
COMMUNITY
Start: 2019-11-07 | End: 2020-01-31 | Stop reason: SDUPTHER

## 2020-01-31 RX ORDER — CHOLECALCIFEROL (VITAMIN D3) 50 MCG
1 TABLET ORAL DAILY
COMMUNITY
Start: 2012-01-01

## 2020-01-31 RX ORDER — WITCH HAZEL 50 %
5000 PADS, MEDICATED (EA) TOPICAL DAILY
COMMUNITY
Start: 2010-01-01

## 2020-01-31 RX ORDER — NEBIVOLOL 10 MG/1
10 TABLET ORAL DAILY
Qty: 90 TABLET | Refills: 3 | Status: SHIPPED | OUTPATIENT
Start: 2020-01-31 | End: 2020-08-31 | Stop reason: DRUGHIGH

## 2020-01-31 NOTE — PROGRESS NOTES
Subjective:       Patient ID: Maegan Cleary is a 74 y.o. male.    Chief Complaint: Annual Exam    HPI   The patient presents for annual physical examination.  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  He remains physically active.  He is resting well at night.  Active medical conditions have included essential hypertension, obstructive sleep apnea on CPAP therapy, dyslipidemia, and degenerative disc disease of the lumbar spine with spinal stenosis and osteopenia.  Fosamax therapy was deferred at the time of diagnosis since the patient was undergoing dental procedures.  The patient reports his CPAP therapy has been very effective.  He averages 7-8 hours of sleep every night.    He is status post surgical treatment for an anal fissure performed by a colorectal surgeon at [facility]; good results are reported.  His last colonoscopy was April 2016 by his gastroenterologist Dr. Eyal Morris.  He will be due for his next screening colonoscopy in 2021.  His last eye examination was in December 2019 by Dr. Humberto Phillips-ophthalmologist ([facility]).    Immunization record was reviewed.  The patient reports he is up-to-date on his immunizations including his Tdap and Pneumovax which are not yet documented in our immunization record.    Social history:  The patient has never smoked.  He does not use alcohol.  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    Review of Systems   Constitutional: Negative for activity change, appetite change, chills, fatigue, fever and unexpected weight change.   HENT: Negative for congestion, ear pain, nosebleeds and postnasal drip.    Eyes: Negative for pain, redness, itching and visual disturbance.   Respiratory: Negative for cough, chest tightness, shortness of breath and wheezing.    Cardiovascular: Negative for chest pain, palpitations and leg swelling.   Gastrointestinal: Negative for abdominal pain,  blood in stool, constipation, nausea and vomiting.   Genitourinary: Negative for difficulty urinating, dysuria, frequency, hematuria and urgency.   Musculoskeletal: Negative for arthralgias, back pain, gait problem, joint swelling, myalgias, neck pain and neck stiffness.   Skin: Negative for color change and rash.   Neurological: Negative for dizziness, seizures, syncope, weakness, light-headedness, numbness and headaches.   Hematological: Does not bruise/bleed easily.   Psychiatric/Behavioral: Negative for agitation, confusion, hallucinations and sleep disturbance. The patient is not nervous/anxious.        Objective:      Physical Exam   Constitutional: He is oriented to person, place, and time. He appears well-developed and well-nourished. No distress.   HENT:   Head: Normocephalic and atraumatic.   Right Ear: External ear normal.   Left Ear: External ear normal.   Mouth/Throat: Oropharynx is clear and moist. No oropharyngeal exudate.   Eyes: Pupils are equal, round, and reactive to light. Conjunctivae and EOM are normal. No scleral icterus.   Neck: Normal range of motion. Neck supple. No JVD present. No thyromegaly present.   Cardiovascular: Normal rate, regular rhythm, normal heart sounds and intact distal pulses. Exam reveals no gallop and no friction rub.   No murmur heard.  Pulmonary/Chest: Effort normal and breath sounds normal. No respiratory distress. He has no wheezes. He has no rales.   Abdominal: Soft. Bowel sounds are normal. He exhibits no distension and no mass. There is no tenderness. There is no guarding.   Musculoskeletal: Normal range of motion. He exhibits no edema or tenderness.   Lymphadenopathy:     He has no cervical adenopathy.   Neurological: He is alert and oriented to person, place, and time. He has normal reflexes. No cranial nerve deficit. He exhibits normal muscle tone.   Skin: Skin is warm and dry. No rash noted.   Psychiatric: He has a normal mood and affect. His behavior is  normal. Thought content normal.   Nursing note and vitals reviewed.      Results for orders placed or performed in visit on 01/31/20   Comprehensive metabolic panel   Result Value Ref Range    Sodium 142 136 - 145 mmol/L    Potassium 4.6 3.5 - 5.1 mmol/L    Chloride 106 95 - 110 mmol/L    CO2 28 23 - 29 mmol/L    Glucose 98 70 - 110 mg/dL    BUN, Bld 14 8 - 23 mg/dL    Creatinine 1.0 0.5 - 1.4 mg/dL    Calcium 9.1 8.7 - 10.5 mg/dL    Total Protein 7.2 6.0 - 8.4 g/dL    Albumin 3.9 3.5 - 5.2 g/dL    Total Bilirubin 0.7 0.1 - 1.0 mg/dL    Alkaline Phosphatase 61 55 - 135 U/L    AST 18 10 - 40 U/L    ALT 19 10 - 44 U/L    Anion Gap 8 8 - 16 mmol/L    eGFR if African American >60.0 >60 mL/min/1.73 m^2    eGFR if non African American >60.0 >60 mL/min/1.73 m^2   CBC auto differential   Result Value Ref Range    WBC 7.72 3.90 - 12.70 K/uL    RBC 4.97 4.60 - 6.20 M/uL    Hemoglobin 15.5 14.0 - 18.0 g/dL    Hematocrit 47.7 40.0 - 54.0 %    Mean Corpuscular Volume 96 82 - 98 fL    Mean Corpuscular Hemoglobin 31.2 (H) 27.0 - 31.0 pg    Mean Corpuscular Hemoglobin Conc 32.5 32.0 - 36.0 g/dL    RDW 13.2 11.5 - 14.5 %    Platelets 188 150 - 350 K/uL    MPV 10.7 9.2 - 12.9 fL    Immature Granulocytes 0.3 0.0 - 0.5 %    Gran # (ANC) 4.0 1.8 - 7.7 K/uL    Immature Grans (Abs) 0.02 0.00 - 0.04 K/uL    Lymph # 3.0 1.0 - 4.8 K/uL    Mono # 0.5 0.3 - 1.0 K/uL    Eos # 0.1 0.0 - 0.5 K/uL    Baso # 0.01 0.00 - 0.20 K/uL    nRBC 0 0 /100 WBC    Gran% 52.4 38.0 - 73.0 %    Lymph% 39.2 18.0 - 48.0 %    Mono% 6.3 4.0 - 15.0 %    Eosinophil% 1.7 0.0 - 8.0 %    Basophil% 0.1 0.0 - 1.9 %    Differential Method Automated    PSA, Screening   Result Value Ref Range    PSA, SCREEN 1.9 0.00 - 4.00 ng/mL   TSH   Result Value Ref Range    TSH 1.501 0.400 - 4.000 uIU/mL   Lipid panel   Result Value Ref Range    Cholesterol 135 120 - 199 mg/dL    Triglycerides 151 (H) 30 - 150 mg/dL    HDL 35 (L) 40 - 75 mg/dL    LDL Cholesterol 69.8 63.0 - 159.0 mg/dL     Hdl/Cholesterol Ratio 25.9 20.0 - 50.0 %    Total Cholesterol/HDL Ratio 3.9 2.0 - 5.0    Non-HDL Cholesterol 100 mg/dL       Assessment:       1. Well adult exam    2. Osteopenia, unspecified location    3. Essential hypertension    4. Lumbar degenerative disc disease        Plan:     Maegan was seen today for annual exam.  A bone density study will be obtained follow-up evaluation of osteopenia.  A prescription for the shingles vaccine was given to the patient to take to his pharmacy.  Current blood pressure medication will be renewed.  The patient will be seen annually and as needed.    Diagnoses and all orders for this visit:    Well adult exam    Osteopenia, unspecified location  -     DXA Bone Density Spine And Hip; Future    Essential hypertension    Lumbar degenerative disc disease    Other orders  -     varicella-zoster [id number], PF, (SHINGRIX, PF,) 50 mcg/0.5 mL injection; Inject 0.5 mLs into the muscle once. for 1 dose  -     nebivolol (BYSTOLIC) 10 MG Tab; Take 1 tablet (10 mg total) by mouth once daily.  -     losartan-hydrochlorothiazide 50-12.5 mg (HYZAAR) 50-12.5 mg per tablet; Take 1 tablet by mouth once daily.

## 2020-02-01 ENCOUNTER — PATIENT MESSAGE (OUTPATIENT)
Dept: INTERNAL MEDICINE | Facility: CLINIC | Age: 75
End: 2020-02-01

## 2020-02-01 DIAGNOSIS — R82.90 ABNORMAL URINALYSIS: Primary | ICD-10-CM

## 2020-02-07 ENCOUNTER — HOSPITAL ENCOUNTER (OUTPATIENT)
Dept: RADIOLOGY | Facility: HOSPITAL | Age: 75
Discharge: HOME | End: 2020-02-07
Attending: INTERNAL MEDICINE
Payer: MEDICARE

## 2020-02-07 DIAGNOSIS — M85.80 OSTEOPENIA, UNSPECIFIED LOCATION: ICD-10-CM

## 2020-02-07 PROCEDURE — 77080 DEXA BONE DENSITY SPINE HIP: ICD-10-PCS | Mod: 26,,, | Performed by: RADIOLOGY

## 2020-02-07 PROCEDURE — 77080 DXA BONE DENSITY AXIAL: CPT | Mod: 26,,, | Performed by: RADIOLOGY

## 2020-02-07 PROCEDURE — 77080 DXA BONE DENSITY AXIAL: CPT | Mod: TC

## 2020-04-20 ENCOUNTER — TELEPHONE (OUTPATIENT)
Dept: INTERNAL MEDICINE | Facility: CLINIC | Age: 75
End: 2020-04-20

## 2020-04-20 NOTE — TELEPHONE ENCOUNTER
----- Message from Pamela Siddiqui sent at 4/20/2020  9:48 AM CDT -----  Contact: Birgit/CORTEZ [phone]   Stated that the appt on 01/31/2020 was not coded as an annual and should have been. Please correct    Please call and advise.    Thank You

## 2020-04-20 NOTE — TELEPHONE ENCOUNTER
It was billed as preventive 85287.    I called [employer] and spoke to william  . .  159.02 not paid. claim is being reviewed. She says patient can appeal if still is not paid in full.     She was unable to tell me what code they consider annual but we did you our code for annual for the visit.

## 2020-05-04 ENCOUNTER — LAB VISIT (OUTPATIENT)
Dept: LAB | Facility: HOSPITAL | Age: 75
End: 2020-05-04
Attending: INTERNAL MEDICINE
Payer: MEDICARE

## 2020-05-04 DIAGNOSIS — Z00.00 PREVENTATIVE HEALTH CARE: Primary | ICD-10-CM

## 2020-05-04 DIAGNOSIS — Z00.00 PREVENTATIVE HEALTH CARE: ICD-10-CM

## 2020-05-04 LAB — SARS-COV-2 IGG SERPLBLD QL IA.RAPID: NEGATIVE

## 2020-05-04 PROCEDURE — 86769 SARS-COV-2 COVID-19 ANTIBODY: CPT

## 2020-05-04 PROCEDURE — 36415 COLL VENOUS BLD VENIPUNCTURE: CPT

## 2020-05-14 ENCOUNTER — TELEPHONE (OUTPATIENT)
Dept: INTERNAL MEDICINE | Facility: CLINIC | Age: 75
End: 2020-05-14

## 2020-05-14 NOTE — TELEPHONE ENCOUNTER
----- Message from Lillie Richey sent at 5/14/2020  9:02 AM CDT -----  Contact: trinity p/ w [facility] [phone]  Trinity states she needs to know what visit did the pt have on 1/31 (was it a annual wellness visit) so that the pt can receive his visit points. Please call and advise.

## 2020-06-03 ENCOUNTER — PATIENT OUTREACH (OUTPATIENT)
Dept: ADMINISTRATIVE | Facility: OTHER | Age: 75
End: 2020-06-03

## 2020-06-04 ENCOUNTER — OFFICE VISIT (OUTPATIENT)
Dept: CARDIOLOGY | Facility: CLINIC | Age: 75
End: 2020-06-04
Payer: MEDICARE

## 2020-06-04 VITALS
SYSTOLIC BLOOD PRESSURE: 123 MMHG | BODY MASS INDEX: 28.35 KG/M2 | HEIGHT: 72 IN | DIASTOLIC BLOOD PRESSURE: 65 MMHG | HEART RATE: 62 BPM | WEIGHT: 209.31 LBS

## 2020-06-04 DIAGNOSIS — Q21.12 PFO WITH ATRIAL SEPTAL ANEURYSM: ICD-10-CM

## 2020-06-04 DIAGNOSIS — R06.02 SHORTNESS OF BREATH: ICD-10-CM

## 2020-06-04 DIAGNOSIS — G47.33 OSA ON CPAP: ICD-10-CM

## 2020-06-04 DIAGNOSIS — I10 ESSENTIAL HYPERTENSION: Primary | ICD-10-CM

## 2020-06-04 DIAGNOSIS — I25.3 PFO WITH ATRIAL SEPTAL ANEURYSM: ICD-10-CM

## 2020-06-04 DIAGNOSIS — I49.49 PREMATURE BEATS: ICD-10-CM

## 2020-06-04 PROCEDURE — 1159F MED LIST DOCD IN RCRD: CPT | Mod: S$GLB,,, | Performed by: INTERNAL MEDICINE

## 2020-06-04 PROCEDURE — 93010 ELECTROCARDIOGRAM REPORT: CPT | Mod: S$GLB,,, | Performed by: INTERNAL MEDICINE

## 2020-06-04 PROCEDURE — 1159F PR MEDICATION LIST DOCUMENTED IN MEDICAL RECORD: ICD-10-PCS | Mod: S$GLB,,, | Performed by: INTERNAL MEDICINE

## 2020-06-04 PROCEDURE — 3078F PR MOST RECENT DIASTOLIC BLOOD PRESSURE < 80 MM HG: ICD-10-PCS | Mod: S$GLB,,, | Performed by: INTERNAL MEDICINE

## 2020-06-04 PROCEDURE — 93005 ELECTROCARDIOGRAM TRACING: CPT | Mod: S$GLB,,, | Performed by: INTERNAL MEDICINE

## 2020-06-04 PROCEDURE — 99204 OFFICE O/P NEW MOD 45 MIN: CPT | Mod: S$GLB,,, | Performed by: INTERNAL MEDICINE

## 2020-06-04 PROCEDURE — 93005 EKG 12-LEAD: ICD-10-PCS | Mod: S$GLB,,, | Performed by: INTERNAL MEDICINE

## 2020-06-04 PROCEDURE — 1125F AMNT PAIN NOTED PAIN PRSNT: CPT | Mod: S$GLB,,, | Performed by: INTERNAL MEDICINE

## 2020-06-04 PROCEDURE — 1101F PT FALLS ASSESS-DOCD LE1/YR: CPT | Mod: S$GLB,,, | Performed by: INTERNAL MEDICINE

## 2020-06-04 PROCEDURE — 3074F SYST BP LT 130 MM HG: CPT | Mod: S$GLB,,, | Performed by: INTERNAL MEDICINE

## 2020-06-04 PROCEDURE — 93010 EKG 12-LEAD: ICD-10-PCS | Mod: S$GLB,,, | Performed by: INTERNAL MEDICINE

## 2020-06-04 PROCEDURE — 1125F PR PAIN SEVERITY QUANTIFIED, PAIN PRESENT: ICD-10-PCS | Mod: S$GLB,,, | Performed by: INTERNAL MEDICINE

## 2020-06-04 PROCEDURE — 3074F PR MOST RECENT SYSTOLIC BLOOD PRESSURE < 130 MM HG: ICD-10-PCS | Mod: S$GLB,,, | Performed by: INTERNAL MEDICINE

## 2020-06-04 PROCEDURE — 99204 PR OFFICE/OUTPT VISIT, NEW, LEVL IV, 45-59 MIN: ICD-10-PCS | Mod: S$GLB,,, | Performed by: INTERNAL MEDICINE

## 2020-06-04 PROCEDURE — 3078F DIAST BP <80 MM HG: CPT | Mod: S$GLB,,, | Performed by: INTERNAL MEDICINE

## 2020-06-04 PROCEDURE — 99999 PR PBB SHADOW E&M-EST. PATIENT-LVL III: ICD-10-PCS | Mod: PBBFAC,,, | Performed by: INTERNAL MEDICINE

## 2020-06-04 PROCEDURE — 1101F PR PT FALLS ASSESS DOC 0-1 FALLS W/OUT INJ PAST YR: ICD-10-PCS | Mod: S$GLB,,, | Performed by: INTERNAL MEDICINE

## 2020-06-04 PROCEDURE — 99999 PR PBB SHADOW E&M-EST. PATIENT-LVL III: CPT | Mod: PBBFAC,,, | Performed by: INTERNAL MEDICINE

## 2020-06-04 RX ORDER — LOSARTAN POTASSIUM 50 MG/1
50 TABLET ORAL DAILY
COMMUNITY
End: 2020-10-20

## 2020-06-04 NOTE — PROGRESS NOTES
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  He reports onset of shortness of breath 3 months ago. The shortness of breath resolved after he restarted HCTZ. He remarked that he was short of breath while seated but did not experience it while exercising.   In 10/2019 creatinine increased to 1.4 (GFR dropped to 49) while on HCTZ 12.5 mg and a PPI. Creatinine is back to baseline.   On losartan and nebivolol for hypertension.  He does not have a h/o CAD. LDL(c) is 70, but HDL is in the 30s. He is not diabetic and he does not take a statin. A CT chest revealed moderate coronary atherosclerosis.  He has sleep apnea and uses CPAP regularly.   He had seen Dr. Araya in EP in 2016 for palpitations. He had occasional PACs and PVCs on a Holter at that time. He does not have atrial fib.   Addendum: He reported frequent premature beats on his Apple Watch.       Review of Systems   Constitution: Negative for fever, malaise/fatigue, weight gain and weight loss.   HENT: Negative for hearing loss and nosebleeds.    Eyes: Negative for vision loss in left eye and vision loss in right eye.   Cardiovascular: Positive for dyspnea on exertion. Negative for chest pain, claudication, irregular heartbeat, leg swelling, palpitations and syncope.   Respiratory: Negative for cough, hemoptysis, sputum production and wheezing.    Hematologic/Lymphatic: Negative for bleeding problem. Does not bruise/bleed easily.   Musculoskeletal: Positive for arthritis and back pain. Negative for falls, joint pain, muscle cramps, muscle weakness and neck pain.   Gastrointestinal: Positive for heartburn. Negative for abdominal pain, constipation, diarrhea, hematochezia and melena.   Genitourinary: Negative for frequency, hematuria and nocturia.   Neurological: Negative for excessive daytime sleepiness, dizziness, focal weakness, headaches,  light-headedness, loss of balance, numbness, paresthesias, seizures and weakness.   Psychiatric/Behavioral: Negative for depression. The patient is not nervous/anxious.        Review of patient's allergies indicates:  No Known Allergies     Current Outpatient Medications   Medication Sig    cholecalciferol, vitamin D3, (VITAMIN D3) 2,000 unit Tab Take 1 tablet by mouth once daily.     cyanocobalamin (VITAMIN B-12) 2000 MCG tablet Take 2,000 mcg by mouth once daily.     gabapentin (NEURONTIN) 300 MG capsule Take 300 mg by mouth once daily. Patient takes as needed    losartan (COZAAR) 50 MG tablet Take 50 mg by mouth once daily.    nebivolol (BYSTOLIC) 10 MG Tab Take 1 tablet (10 mg total) by mouth once daily.    SAW PALMETTO ORAL Take 1 tablet by mouth once daily.         Past Medical and Surgical history:  Maegan Cleary  has a past medical history of Hyperlipidemia, Hypertension, Insomnia, CRIS (obstructive sleep apnea), CRIS on CPAP, and Vitamin D deficiency.   Past Surgical History:   Procedure Laterality Date    BASAL CELL CARCINOMA EXCISION      cataract surgery      Colorectal Surgery  11/07/2019    Anal Sphincterectomy    TRANSFORAMINAL EPIDURAL INJECTION OF STEROID Right 8/26/2019    Procedure: INJECTION, STEROID, EPIDURAL, TRANSFORAMINAL APPROACH;  Surgeon: Maikel Millan MD;  Location[facility];  Service: Pain Management;  Laterality: Right;  RT TF JOSE MARIA L4-5, LF-S1  *add Monday per Dr Millan         Social history:  Maegan Cleary  reports that he has quit smoking. His smoking use included cigarettes. He has never used smokeless tobacco. He reports that he does not drink alcohol or use drugs.    Family history:  Maegan Cleary's family history includes Cancer in his brother; Heart attack (age of onset: 72) in his brother; Hypertension in his brother; Prostate cancer in his brother.         Objective:     /65   Pulse 62   Ht 6' (1.829 m)   Wt 95 kg (209 lb 5.2 oz)   BMI 28.39 kg/m²       Physical Exam   Constitutional: He is oriented to person, place, and time. He appears well-developed and well-nourished.   /65   Pulse 62   Ht 6' (1.829 m)   Wt 95 kg (209 lb 5.2 oz)   BMI 28.39 kg/m²      HENT:   Head: Normocephalic.   Neck: No JVD present.   Cardiovascular: Normal rate, regular rhythm, S1 normal and S2 normal. Frequent extrasystoles are present.   No murmur heard.  Pulses:       Radial pulses are 2+ on the right side, and 2+ on the left side.        Posterior tibial pulses are 2+ on the right side, and 2+ on the left side.   Pulmonary/Chest: Breath sounds normal. Chest wall is not dull to percussion.   Abdominal: Soft. He exhibits no mass. There is no splenomegaly or hepatomegaly.   Musculoskeletal:        Right lower leg: He exhibits no edema.        Left lower leg: He exhibits no edema.   Neurological: He is alert and oriented to person, place, and time. Gait normal.   Skin: No bruising noted. Nails show no clubbing.   Psychiatric: He has a normal mood and affect. His speech is normal and behavior is normal.         Lab Results   Component Value Date    CHOL 135 01/31/2020    CHOL 169 02/02/2019    CHOL 187 01/27/2018     Lab Results   Component Value Date    HDL 35 (L) 01/31/2020    HDL 32 (L) 02/02/2019    HDL 31 (L) 01/27/2018     Lab Results   Component Value Date    LDLCALC 69.8 01/31/2020    LDLCALC 95.4 02/02/2019    LDLCALC 108.8 01/27/2018     Lab Results   Component Value Date    TRIG 151 (H) 01/31/2020    TRIG 208 (H) 02/02/2019    TRIG 236 (H) 01/27/2018     Lab Results   Component Value Date    CHOLHDL 25.9 01/31/2020    CHOLHDL 18.9 (L) 02/02/2019    CHOLHDL 16.6 (L) 01/27/2018     Lab Results   Component Value Date    ALT 19 01/31/2020     Lab Results   Component Value Date    ALT 19 01/31/2020    AST 18 01/31/2020    ALKPHOS 61 01/31/2020    BILITOT 0.7 01/31/2020      Lab Results   Component Value Date    CREATININE 1.0 01/31/2020    BUN 14 01/31/2020      01/31/2020    K 4.6 01/31/2020     01/31/2020    CO2 28 01/31/2020         ECG today reviewed by me. It reveals sinus rhythm with occasional PACs and no ST or T abnormality.       Assessment and Plan:       ICD-10-CM ICD-9-CM   1. Essential hypertension  I10 401.9   2. Shortness of breath  R06.02 786.05   3. CRIS on CPAP  G47.33 327.23    Z99.89 V46.8   4. PFO with atrial septal aneurysm  Q21.1 745.5   5. Premature beats  I49.49 427.60        Obtain echo for shortness of breath. He has LV diastolic dysfunction on a prior echo. He has no symptoms suggestive of obstructive coronary artery disease. I don't feel that a stress test is indicated.   LDL(c) is 70, but HDL is in the 30s. He is not diabetic and he does not take a statin. 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 I don't recommend a statin or a formal coronary calcium score, but he should take an aspirin.  He called 3 days after his appt and reported frequent premature beats. Order for Holter placed but none is available at main [place] and we may have given away the last one at [place]. Will reserve one for him and also try the [place] location.     Orders placed during this visit:    Essential hypertension  -     Echo Color Flow Doppler? Yes; Future  -     IN OFFICE EKG 12-LEAD (to Muse); Future    Shortness of breath  -     Echo Color Flow Doppler? Yes; Future    CRIS on CPAP  -     Echo Color Flow Doppler? Yes; Future    PFO with atrial septal aneurysm  -     Echo Color Flow Doppler? Yes; Future    Premature beats  -     Holter monitor - 48 hour; Future; Expected date: 06/08/2020           Follow up if symptoms worsen or fail to improve.

## 2020-06-08 ENCOUNTER — CLINICAL SUPPORT (OUTPATIENT)
Dept: CARDIOLOGY | Facility: CLINIC | Age: 75
End: 2020-06-08
Attending: INTERNAL MEDICINE
Payer: MEDICARE

## 2020-06-08 DIAGNOSIS — R00.2 PALPITATIONS: ICD-10-CM

## 2020-06-08 PROCEDURE — 93224 XTRNL ECG REC UP TO 48 HRS: CPT | Mod: S$GLB,,, | Performed by: INTERNAL MEDICINE

## 2020-06-08 PROCEDURE — 93224 HOLTER MONITOR - 48 HOUR (CUPID ONLY): ICD-10-PCS | Mod: S$GLB,,, | Performed by: INTERNAL MEDICINE

## 2020-06-11 LAB
OHS CV EVENT MONITOR DAY: 0
OHS CV HOLTER LENGTH DECIMAL HOURS: 48
OHS CV HOLTER LENGTH HOURS: 48
OHS CV HOLTER LENGTH MINUTES: 0

## 2020-06-12 ENCOUNTER — CLINICAL SUPPORT (OUTPATIENT)
Dept: CARDIOLOGY | Facility: CLINIC | Age: 75
End: 2020-06-12
Attending: INTERNAL MEDICINE
Payer: MEDICARE

## 2020-06-12 VITALS
WEIGHT: 209 LBS | DIASTOLIC BLOOD PRESSURE: 70 MMHG | HEIGHT: 72 IN | SYSTOLIC BLOOD PRESSURE: 120 MMHG | BODY MASS INDEX: 28.31 KG/M2 | HEART RATE: 72 BPM

## 2020-06-12 DIAGNOSIS — G47.33 OSA ON CPAP: ICD-10-CM

## 2020-06-12 DIAGNOSIS — I25.3 PFO WITH ATRIAL SEPTAL ANEURYSM: ICD-10-CM

## 2020-06-12 DIAGNOSIS — I10 ESSENTIAL HYPERTENSION: ICD-10-CM

## 2020-06-12 DIAGNOSIS — Q21.12 PFO WITH ATRIAL SEPTAL ANEURYSM: ICD-10-CM

## 2020-06-12 LAB
ASCENDING AORTA: 3.4 CM
AV INDEX (PROSTH): 0.61
AV MEAN GRADIENT: 6 MMHG
AV PEAK GRADIENT: 11 MMHG
AV VALVE AREA: 1.94 CM2
AV VELOCITY RATIO: 0.61
BSA FOR ECHO PROCEDURE: 2.19 M2
CV ECHO LV RWT: 0.41 CM
DOP CALC AO PEAK VEL: 1.68 M/S
DOP CALC AO VTI: 35.57 CM
DOP CALC LVOT AREA: 3.2 CM2
DOP CALC LVOT DIAMETER: 2.01 CM
DOP CALC LVOT PEAK VEL: 1.02 M/S
DOP CALC LVOT STROKE VOLUME: 68.98 CM3
DOP CALCLVOT PEAK VEL VTI: 21.75 CM
E WAVE DECELERATION TIME: 220 MSEC
E/A RATIO: 1.18
E/E' RATIO: 8.92 M/S
ECHO LV POSTERIOR WALL: 0.9 CM (ref 0.6–1.1)
FRACTIONAL SHORTENING: 40 % (ref 28–44)
INTERVENTRICULAR SEPTUM: 0.94 CM (ref 0.6–1.1)
IVRT: 91.34 MSEC
LA MAJOR: 5.95 CM
LA MINOR: 5.89 CM
LA WIDTH: 4.05 CM
LEFT ATRIUM SIZE: 4.45 CM
LEFT ATRIUM VOLUME INDEX MOD: 45.6 ML/M2
LEFT ATRIUM VOLUME INDEX: 41.8 ML/M2
LEFT ATRIUM VOLUME MOD: 99 CM3
LEFT ATRIUM VOLUME: 90.69 CM3
LEFT INTERNAL DIMENSION IN SYSTOLE: 2.63 CM (ref 2.1–4)
LEFT VENTRICLE DIASTOLIC VOLUME INDEX: 39.64 ML/M2
LEFT VENTRICLE DIASTOLIC VOLUME: 86.05 ML
LEFT VENTRICLE MASS INDEX: 60 G/M2
LEFT VENTRICLE SYSTOLIC VOLUME INDEX: 11.7 ML/M2
LEFT VENTRICLE SYSTOLIC VOLUME: 25.29 ML
LEFT VENTRICULAR INTERNAL DIMENSION IN DIASTOLE: 4.36 CM (ref 3.5–6)
LEFT VENTRICULAR MASS: 129.93 G
LV LATERAL E/E' RATIO: 8.29 M/S
LV SEPTAL E/E' RATIO: 9.67 M/S
MV PEAK A VEL: 0.49 M/S
MV PEAK E VEL: 0.58 M/S
PISA TR MAX VEL: 2.69 M/S
PULM VEIN S/D RATIO: 1
PV PEAK D VEL: 0.49 M/S
PV PEAK S VEL: 0.49 M/S
PV PEAK VELOCITY: 1.63 CM/S
RA MAJOR: 5.52 CM
RA PRESSURE: 3 MMHG
RA WIDTH: 3.93 CM
RIGHT VENTRICULAR END-DIASTOLIC DIMENSION: 4.17 CM
RV TISSUE DOPPLER FREE WALL SYSTOLIC VELOCITY 1 (APICAL 4 CHAMBER VIEW): 18 CM/S
SINUS: 3.4 CM
STJ: 3.1 CM
TDI LATERAL: 0.07 M/S
TDI SEPTAL: 0.06 M/S
TDI: 0.07 M/S
TR MAX PG: 29 MMHG
TRICUSPID ANNULAR PLANE SYSTOLIC EXCURSION: 2.2 CM
TV REST PULMONARY ARTERY PRESSURE: 32 MMHG

## 2020-06-12 PROCEDURE — 93306 ECHO (CUPID ONLY): ICD-10-PCS | Mod: S$GLB,,, | Performed by: INTERNAL MEDICINE

## 2020-06-12 PROCEDURE — 93306 TTE W/DOPPLER COMPLETE: CPT | Mod: S$GLB,,, | Performed by: INTERNAL MEDICINE

## 2020-06-12 PROCEDURE — 99999 PR PBB SHADOW E&M-EST. PATIENT-LVL II: ICD-10-PCS | Mod: PBBFAC,,,

## 2020-06-12 PROCEDURE — 99999 PR PBB SHADOW E&M-EST. PATIENT-LVL II: CPT | Mod: PBBFAC,,,

## 2020-06-15 ENCOUNTER — TELEPHONE (OUTPATIENT)
Dept: CARDIOLOGY | Facility: HOSPITAL | Age: 75
End: 2020-06-15

## 2020-06-15 DIAGNOSIS — I25.10 CORONARY ARTERY CALCIFICATION: Primary | ICD-10-CM

## 2020-06-15 DIAGNOSIS — R00.2 PALPITATIONS: Primary | ICD-10-CM

## 2020-06-15 DIAGNOSIS — I25.84 CORONARY ARTERY CALCIFICATION: Primary | ICD-10-CM

## 2020-06-15 RX ORDER — ROSUVASTATIN CALCIUM 10 MG/1
10 TABLET, COATED ORAL DAILY
Qty: 90 TABLET | Refills: 3 | Status: SHIPPED | OUTPATIENT
Start: 2020-06-15 | End: 2021-03-25 | Stop reason: SDUPTHER

## 2020-06-15 NOTE — TELEPHONE ENCOUNTER
Discussed with Dr. Cleary the findings of his holter monitor.  The concern is development of atrial fibrillation, either already or in the future. My suspicion is that if he does not yet have paroxysmal AF, he will in the future.  Recommend:  Weight reduction  Objective assessment of sleep apnea management efficacy.  Increase Bisoprolol to 20 mg daily.    Implantable loop monitor  He is in agreement.

## 2020-06-16 ENCOUNTER — TELEPHONE (OUTPATIENT)
Dept: INTERNAL MEDICINE | Facility: CLINIC | Age: 75
End: 2020-06-16

## 2020-06-16 DIAGNOSIS — Q61.02 MULTIPLE RENAL CYSTS: Primary | ICD-10-CM

## 2020-06-16 NOTE — TELEPHONE ENCOUNTER
----- Message from Veronica Guillen MD sent at 6/15/2020  8:47 PM CDT -----  Discussed results w patient. Normal LVEF, enlarged left atrium and PA systolic pressure in the 30s. Consider gradually increasing the dose of Bystolic 10 mg to one and a half tab a day then perhaps to 2 tab a day as suggested by James Araya.  Also the 7 cm cyst seen in the subcostal view requires additional imaging. Two cysts were noted on chest CT in the right kidney in 2019.  I will ask Dr. Carr to schedule an ultrasound or CT of the abdomen.  Lastly there was calcification of the coronary arteries seen on the same CT. Recommend a statin and low dose aspirin. Will send a prescription for rosuvastatin 10 mg

## 2020-06-16 NOTE — TELEPHONE ENCOUNTER
I left a phone message this p.m. regarding the need for renal ultrasound for follow-up of the right renal cysts.

## 2020-06-19 ENCOUNTER — HOSPITAL ENCOUNTER (OUTPATIENT)
Dept: RADIOLOGY | Facility: HOSPITAL | Age: 75
Discharge: HOME | End: 2020-06-19
Attending: INTERNAL MEDICINE
Payer: MEDICARE

## 2020-06-19 DIAGNOSIS — Q61.02 MULTIPLE RENAL CYSTS: ICD-10-CM

## 2020-06-19 PROCEDURE — 76770 US EXAM ABDO BACK WALL COMP: CPT | Mod: 26,,, | Performed by: RADIOLOGY

## 2020-06-19 PROCEDURE — 76770 US EXAM ABDO BACK WALL COMP: CPT | Mod: TC

## 2020-06-19 PROCEDURE — 76770 US RETROPERITONEAL COMPLETE: ICD-10-PCS | Mod: 26,,, | Performed by: RADIOLOGY

## 2020-06-22 ENCOUNTER — TELEPHONE (OUTPATIENT)
Dept: SLEEP MEDICINE | Facility: CLINIC | Age: 75
End: 2020-06-22

## 2020-06-22 DIAGNOSIS — G47.33 OSA (OBSTRUCTIVE SLEEP APNEA): Primary | ICD-10-CM

## 2020-06-22 NOTE — TELEPHONE ENCOUNTER
----- Message from Olya Mack sent at 6/22/2020  6:57 AM CDT -----  Regarding: Portal  Pt made contact via [facility] as follows:    Appointment Request From: Maegan Cleary    With Provider: Meghna Ayon MD [[facility]]    Preferred Date Range: Any date 6/29/2020 or later    Preferred Times: Any Time    Reason for visit: I have no CPAP supplies    Comments:  I have not received CPAP supplies since October 2019 I have called several times spoke with Allison the supervisor  ,I have palpitation     Thank you.

## 2020-06-22 NOTE — TELEPHONE ENCOUNTER
Spoke with patient and scheduled ILR for 7/17/2020. Will send all information through patient portal, as requested.

## 2020-06-27 ENCOUNTER — PATIENT MESSAGE (OUTPATIENT)
Dept: INTERNAL MEDICINE | Facility: CLINIC | Age: 75
End: 2020-06-27

## 2020-07-14 ENCOUNTER — LAB VISIT (OUTPATIENT)
Dept: FAMILY MEDICINE | Facility: CLINIC | Age: 75
End: 2020-07-14
Payer: MEDICARE

## 2020-07-14 PROCEDURE — U0003 INFECTIOUS AGENT DETECTION BY NUCLEIC ACID (DNA OR RNA); SEVERE ACUTE RESPIRATORY SYNDROME CORONAVIRUS 2 (SARS-COV-2) (CORONAVIRUS DISEASE [COVID-19]), AMPLIFIED PROBE TECHNIQUE, MAKING USE OF HIGH THROUGHPUT TECHNOLOGIES AS DESCRIBED BY CMS-2020-01-R: HCPCS

## 2020-07-15 LAB — SARS-COV-2 RNA RESP QL NAA+PROBE: NOT DETECTED

## 2020-07-16 ENCOUNTER — PATIENT OUTREACH (OUTPATIENT)
Dept: ADMINISTRATIVE | Facility: OTHER | Age: 75
End: 2020-07-16

## 2020-07-16 NOTE — PROGRESS NOTES
Requested updates within Care Everywhere.  Patient's chart was reviewed for overdue WARREN topics.  Immunizations reconciled.    Orders placed:  Tasked appts:  Labs Linked:

## 2020-07-16 NOTE — LETTER
July 16, 2020    Humberto Phillips MD             [facility]  [address]  [facility] [address]  Phone: [phone] July 16, 2020     Patient: Maegan Cleary    YOB: 1945   Date of Visit: 7/16/2020     Maegan Baird a patient of Dr. Leandro Carr (PCP) at [facility]. While reviewing his/her chart, it has come to our attention that there is an outstanding lab/procedure. Please help keep our Health Maintenance records as accurate and as up to date as possible by supplying the following:     2019 Eye exam                                                                          Please fax to [facility] @ [phone].    Thank you for your assistance in our patient's healthcare.     Sincerely,     Claribel Sam MA   [facility]  Panel Care Coordinator  [facility]

## 2020-07-17 ENCOUNTER — HOSPITAL ENCOUNTER (OUTPATIENT)
Facility: HOSPITAL | Age: 75
Discharge: HOME | End: 2020-07-17
Attending: INTERNAL MEDICINE | Admitting: INTERNAL MEDICINE
Payer: MEDICARE

## 2020-07-17 VITALS
OXYGEN SATURATION: 96 % | DIASTOLIC BLOOD PRESSURE: 66 MMHG | HEART RATE: 64 BPM | WEIGHT: 204 LBS | TEMPERATURE: 97 F | RESPIRATION RATE: 20 BRPM | HEIGHT: 72 IN | SYSTOLIC BLOOD PRESSURE: 121 MMHG | BODY MASS INDEX: 27.63 KG/M2

## 2020-07-17 DIAGNOSIS — I48.91 ATRIAL FIBRILLATION: ICD-10-CM

## 2020-07-17 DIAGNOSIS — R00.2 PALPITATIONS: ICD-10-CM

## 2020-07-17 PROCEDURE — 93010 ELECTROCARDIOGRAM REPORT: CPT | Mod: ,,, | Performed by: INTERNAL MEDICINE

## 2020-07-17 PROCEDURE — C1764 EVENT RECORDER, CARDIAC: HCPCS | Performed by: INTERNAL MEDICINE

## 2020-07-17 PROCEDURE — 93005 ELECTROCARDIOGRAM TRACING: CPT

## 2020-07-17 PROCEDURE — 93010 EKG 12-LEAD: ICD-10-PCS | Mod: ,,, | Performed by: INTERNAL MEDICINE

## 2020-07-17 PROCEDURE — 33285 INSJ SUBQ CAR RHYTHM MNTR: CPT | Mod: ,,, | Performed by: INTERNAL MEDICINE

## 2020-07-17 PROCEDURE — 33285 PR INSERTION,SUBQ CARDIAC RHYTHM MONITOR, W/PRGRMG: ICD-10-PCS | Mod: ,,, | Performed by: INTERNAL MEDICINE

## 2020-07-17 PROCEDURE — 25000003 PHARM REV CODE 250: Performed by: INTERNAL MEDICINE

## 2020-07-17 PROCEDURE — 33285 INSJ SUBQ CAR RHYTHM MNTR: CPT | Performed by: INTERNAL MEDICINE

## 2020-07-17 PROCEDURE — 63600175 PHARM REV CODE 636 W HCPCS: Performed by: NURSE PRACTITIONER

## 2020-07-17 DEVICE — MON LNQ11 REVEAL LINQ USA FW2.0
Type: IMPLANTABLE DEVICE | Site: CHEST | Status: NON-FUNCTIONAL
Brand: REVEAL LINQ™
Removed: 2024-01-25

## 2020-07-17 RX ORDER — LIDOCAINE HYDROCHLORIDE 20 MG/ML
INJECTION, SOLUTION INFILTRATION; PERINEURAL
Status: DISCONTINUED | OUTPATIENT
Start: 2020-07-17 | End: 2020-07-17 | Stop reason: HOSPADM

## 2020-07-17 RX ORDER — SODIUM CHLORIDE 9 MG/ML
INJECTION, SOLUTION INTRAVENOUS ONCE
Status: ACTIVE | OUTPATIENT
Start: 2020-07-17

## 2020-07-17 RX ORDER — CEFAZOLIN SODIUM 1 G/3ML
2 INJECTION, POWDER, FOR SOLUTION INTRAMUSCULAR; INTRAVENOUS
Status: COMPLETED | OUTPATIENT
Start: 2020-07-17 | End: 2020-07-17

## 2020-07-17 NOTE — DISCHARGE SUMMARY
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 He was evaluated for being  shortness of breath, and evaluated with Holter monitor which showed very frequent PACs and nonsustained AT. No evidence of atrial fibrillation. He presented today for ILR implantation. Not taking anticoagulation.     TTE 6/2020  · Normal left ventricular systolic function. The estimated ejection fraction is 65%.  Indeterminate left ventricular diastolic function.    Procedure(s) (LRB):  Insertion, Implantable Loop Recorder (N/A)     Indwelling Lines/Drains at time of discharge:  Lines/Drains/Airways     None             Hospital Course:  Successful implantation of Loop Recorder. No complication. Discharged with no change in his medications.     Pending Diagnostic Studies:     None          Final Active Diagnoses:    Diagnosis Date Noted POA    Palpitations [R00.2] 07/17/2020 Yes      Problems Resolved During this Admission:     No new Assessment & Plan notes have been filed under this hospital service since the last note was generated.  Service: Arrhythmia      Discharged Condition: stable    Disposition: Home or Self Care    Medications:  Reconciled Home Medications:      Medication List      CONTINUE taking these medications    gabapentin 300 MG capsule  Commonly known as: NEURONTIN  Take 300 mg by mouth once daily. Patient takes as needed     losartan 50 MG tablet  Commonly known as: COZAAR  Take 50 mg by mouth once daily.      losartan-hydrochlorothiazide 50-12.5 mg 50-12.5 mg per tablet  Commonly known as: HYZAAR  Take 1 tablet by mouth once daily.     nebivoloL 10 MG Tab  Commonly known as: BYSTOLIC  Take 1 tablet (10 mg total) by mouth once daily.     rosuvastatin 10 MG tablet  Commonly known as: CRESTOR  Take 1 tablet (10 mg total) by mouth once daily.     SAW PALMETTO ORAL  Take 1 tablet by mouth once daily.     VITAMIN B-12 2000 MCG tablet  Generic drug: cyanocobalamin  Take 2,000 mcg by mouth once daily.     VITAMIN D3 50 mcg (2,000 unit) Tab  Generic drug: cholecalciferol (vitamin D3)  Take 1 tablet by mouth once daily.            Time spent on the discharge of patient: 35 minutes    Flavio Hendrickson MD  Cardiac Electrophysiology  [facility] Short Stay Cardiac Unit

## 2020-07-17 NOTE — ASSESSMENT & PLAN NOTE
74 year old male with recently evaluated Holter which showed very frequent PACs and nonsustained AT.  Recently increased nebivolol to 20 mg PO daily  ILR placement to monitor his rhythm     Informed Consent  -The risks of ILR implantation include but are not limited to: Bleeding, infection, heart rhythm abnormalities, allergic reactions, kidney injury, stroke and death.    -Informed consent was obtained & the patient is agreeable to proceed with the procedure.

## 2020-07-17 NOTE — SUBJECTIVE & OBJECTIVE
Past Medical History:   Diagnosis Date    Hyperlipidemia     Hypertension     Insomnia     CRIS (obstructive sleep apnea)     CRIS on CPAP     Vitamin D deficiency        Past Surgical History:   Procedure Laterality Date    BASAL CELL CARCINOMA EXCISION      cataract surgery      Colorectal Surgery  11/07/2019    Anal Sphincterectomy    TRANSFORAMINAL EPIDURAL INJECTION OF STEROID Right 8/26/2019    Procedure: INJECTION, STEROID, EPIDURAL, TRANSFORAMINAL APPROACH;  Surgeon: Maikel Millan MD;  [facility];  Service: Pain Management;  Laterality: Right;  RT TF JOSE MARIA L4-5, LF-S1  *add Monday per Dr Millan       Review of patient's allergies indicates:  No Known Allergies    No current facility-administered medications on file prior to encounter.      Current Outpatient Medications on File Prior to Encounter   Medication Sig    cholecalciferol, vitamin D3, (VITAMIN D3) 2,000 unit Tab Take 1 tablet by mouth once daily.     cyanocobalamin (VITAMIN B-12) 2000 MCG tablet Take 2,000 mcg by mouth once daily.     gabapentin (NEURONTIN) 300 MG capsule Take 300 mg by mouth once daily. Patient takes as needed    losartan (COZAAR) 50 MG tablet Take 50 mg by mouth once daily.    losartan-hydrochlorothiazide 50-12.5 mg (HYZAAR) 50-12.5 mg per tablet Take 1 tablet by mouth once daily. (Patient not taking: Reported on 6/4/2020)    nebivolol (BYSTOLIC) 10 MG Tab Take 1 tablet (10 mg total) by mouth once daily.    SAW PALMETTO ORAL Take 1 tablet by mouth once daily.     Family History     Problem Relation (Age of Onset)    Cancer Brother    Heart attack Brother (72)    Hypertension Brother    Prostate cancer Brother        Tobacco Use    Smoking status: Former Smoker     Types: Cigarettes    Smokeless tobacco: Never Used    Tobacco comment: Stopped about 40 years ago   Substance and Sexual Activity    Alcohol use: No     Frequency: Monthly or less     Drinks per session: 1 or 2     Binge frequency:  Never     Comment: Rarely, only on special occasions    Drug use: No    Sexual activity: Not on file     Review of Systems   Constitution: Negative for chills and decreased appetite.   HENT: Negative for congestion.    Cardiovascular: Negative for chest pain, irregular heartbeat and leg swelling.   Respiratory: Negative for cough and shortness of breath.    Endocrine: Negative for cold intolerance and heat intolerance.   Skin: Negative for rash.   Musculoskeletal: Negative for arthritis and back pain.   Gastrointestinal: Negative for abdominal pain, constipation and diarrhea.   Genitourinary: Negative for dysuria and hematuria.   Neurological: Negative for dizziness and headaches.   Psychiatric/Behavioral: Negative for altered mental status.     Objective:     Vital Signs (Most Recent):    Vital Signs (24h Range):  BP: ()/()   Arterial Line BP: ()/()           There is no height or weight on file to calculate BMI.            Physical Exam   Constitutional: He appears well-developed and well-nourished. No distress.   HENT:   Head: Normocephalic.   Left Ear: External ear normal.   Eyes: Pupils are equal, round, and reactive to light. Right eye exhibits no discharge. Left eye exhibits no discharge.   Neck: Normal range of motion. No thyromegaly present.   Cardiovascular: Normal rate and regular rhythm. Exam reveals no friction rub.   No murmur heard.  Pulmonary/Chest: Effort normal and breath sounds normal. No respiratory distress.   Abdominal: Soft. Bowel sounds are normal. He exhibits no distension.   Musculoskeletal: Normal range of motion.         General: No edema.   Neurological: He is alert.       Significant Labs: All pertinent lab results from the last 24 hours have been reviewed.    Significant Imaging: Echocardiogram:   2D echo with color flow doppler:   Results for orders placed or performed during the hospital encounter of 07/20/16   2D echo with color flow doppler   Result Value Ref Range    QEF 60 55 -  65    Diastolic Dysfunction No     Narrative    Date of Procedure: 07/20/2016        TEST DESCRIPTION       Aorta: The aortic root is normal in size, measuring 2.8 cm at sinotubular junction and 3.2 cm at Sinuses of Valsalva. The proximal ascending aorta is normal in size, measuring 3.1 cm across.     Left Atrium: The left atrial volume index is severely enlarged, measuring 53.52 cc/m2.     Left Ventricle: The left ventricle is normal in size, with an end-diastolic diameter of 5.0 cm, and an end-systolic diameter of 2.8 cm. LV wall thickness is normal, with the septum and the posterior wall each measuring 1.1 cm across. Relative wall   thickness was increased at 0.44, and the LV mass index was 112.2 g/m2 consistent with concentric remodeling. Global left ventricular systolic function appears normal. Visually estimated ejection fraction is 60-65%. The LV Doppler derived stroke volume   equals 78.0 ccs.   The E/e'(lat) is -5, consistent with normal diastolic function.     Right Atrium: The right atrium is normal in size, measuring 6.1 cm in length and 4.7 cm in width in the apical view.     Right Ventricle: The right ventricle is normal in size measuring 4.5 cm at the base in the apical right ventricle-focused view. Global right ventricular systolic function appears normal. Tricuspid annular plane systolic excursion (TAPSE) is 3.2 cm.     Aortic Valve:  The aortic valve is normal in structure.     Mitral Valve:  The mitral valve is normal in structure.     Tricuspid Valve:  The tricuspid valve is normal in structure.     Pulmonary Valve:  The pulmonic valve is normal in structure.     IVC: IVC is normal in size and collapses > 50% with a sniff, suggesting normal right atrial pressure of 3 mmHg.     Intracavitary: There is no evidence of pericardial effusion, intracavity mass, thrombi, or vegetation.     Incidental finding of PFO.        CONCLUSIONS     1 - Normal left ventricular systolic function (EF 60-65%).     2 -  Severe left atrial enlargement.     3 - Normal left ventricular diastolic function.     4 - Normal right ventricular systolic function .             This document has been electronically    SIGNED BY: Melissa Meyers MD On: 07/20/2016 10:10    and Transthoracic echo (TTE) complete (Cupid Only):   Results for orders placed or performed in visit on 06/12/20   Echo Color Flow Doppler? Yes   Result Value Ref Range    Ascending aorta 3.40 cm    STJ 3.10 cm    AV mean gradient 6 mmHg    Ao peak josé antonio 1.68 m/s    Ao VTI 35.57 cm    IVRT 91.34 msec    IVS 0.94 0.6 - 1.1 cm    LA size 4.45 cm    Left Atrium Major Axis 5.95 cm    Left Atrium Minor Axis 5.89 cm    LVIDD 4.36 3.5 - 6.0 cm    LVIDS 2.63 2.1 - 4.0 cm    LVOT diameter 2.01 cm    LVOT peak VTI 21.75 cm    PW 0.90 0.6 - 1.1 cm    MV Peak A José Antonio 0.49 m/s    E wave decelartion time 220.00 msec    MV Peak E José Antonio 0.58 m/s    PV Peak D José Antonio 0.49 m/s    PV Peak S José Antonio 0.49 m/s    RA Major Axis 5.52 cm    RA Width 3.93 cm    RVDD 4.17 cm    Sinus 3.40 cm    TAPSE 2.20 cm    TR Max José Antonio 2.69 m/s    TDI LATERAL 0.07 m/s    TDI SEPTAL 0.06 m/s    LA WIDTH 4.05 cm    PV PEAK VELOCITY 1.63 cm/s    LV Diastolic Volume 86.05 mL    LV Systolic Volume 25.29 mL    LVOT peak josé antonio 1.02 m/s    LV LATERAL E/E' RATIO 8.29 m/s    LV SEPTAL E/E' RATIO 9.67 m/s    FS 40 %    LA volume 90.69 cm3    LV mass 129.93 g    Left Ventricle Relative Wall Thickness 0.41 cm    AV valve area 1.94 cm2    AV Velocity Ratio 0.61     AV index (prosthetic) 0.61     E/A ratio 1.18     Mean e' 0.07 m/s    Pulm vein S/D ratio 1.00     LVOT area 3.2 cm2    LVOT stroke volume 68.98 cm3    AV peak gradient 11 mmHg    E/E' ratio 8.92 m/s    Triscuspid Valve Regurgitation Peak Gradient 29 mmHg    BSA 2.19 m2    LV Systolic Volume Index 11.7 mL/m2    LV Diastolic Volume Index 39.64 mL/m2    LA Volume Index 41.8 mL/m2    LV Mass Index 60 g/m2    Right Atrial Pressure (from IVC) 3 mmHg    RV S' 18 cm/s    TV rest pulmonary  artery pressure 32 mmHg    LA Volume Index (Mod) 45.6 mL/m2    LA volume (mod) 99.00 cm3    Narrative    · Moderate left atrial enlargement.  · Normal left ventricular systolic function. The estimated ejection   fraction is 65%.  · Indeterminate left ventricular diastolic function.  · Normal right ventricular systolic function.  · Mild right atrial enlargement.  · Mild aortic regurgitation.  · Mild tricuspid regurgitation.  · Mild pulmonic regurgitation.  · Normal central venous pressure (3 mmHg).  · The estimated PA systolic pressure is 32 mmHg.  · There is a large cyst measuring 6.8 x 7.8 cm in the subcostal view.

## 2020-07-17 NOTE — H&P
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 He was evaluated for being  shortness of breath, and evaluated with Holter monitor which showed very frequent PACs and nonsustained AT. No evidence of atrial fibrillation. He presented today for ILR implantation. Not taking anticoagulation.     TTE 6/2020  · Normal left ventricular systolic function. The estimated ejection fraction is 65%.  Indeterminate left ventricular diastolic function.    Past Medical History:   Diagnosis Date    Hyperlipidemia     Hypertension     Insomnia     CRIS (obstructive sleep apnea)     CRIS on CPAP     Vitamin D deficiency        Past Surgical History:   Procedure Laterality Date    BASAL CELL CARCINOMA EXCISION      cataract surgery      Colorectal Surgery  11/07/2019    Anal Sphincterectomy    TRANSFORAMINAL EPIDURAL INJECTION OF STEROID Right 8/26/2019    Procedure: INJECTION, STEROID, EPIDURAL, TRANSFORAMINAL APPROACH;  Surgeon: Maikel Millan MD;  Location: [facility];  Service: Pain Management;  Laterality: Right;  RT TF JOSE MARIA L4-5, LF-S1  *add Monday per Dr Millan       Review of patient's allergies indicates:  No Known Allergies    No current facility-administered medications on file prior to encounter.      Current Outpatient Medications on File Prior to Encounter   Medication Sig    cholecalciferol, vitamin D3, (VITAMIN D3) 2,000 unit Tab Take 1 tablet by mouth once daily.     cyanocobalamin (VITAMIN B-12) 2000 MCG tablet Take 2,000 mcg by mouth once daily.     gabapentin  (NEURONTIN) 300 MG capsule Take 300 mg by mouth once daily. Patient takes as needed    losartan (COZAAR) 50 MG tablet Take 50 mg by mouth once daily.    losartan-hydrochlorothiazide 50-12.5 mg (HYZAAR) 50-12.5 mg per tablet Take 1 tablet by mouth once daily. (Patient not taking: Reported on 6/4/2020)    nebivolol (BYSTOLIC) 10 MG Tab Take 1 tablet (10 mg total) by mouth once daily.    SAW PALMETTO ORAL Take 1 tablet by mouth once daily.     Family History     Problem Relation (Age of Onset)    Cancer Brother    Heart attack Brother (72)    Hypertension Brother    Prostate cancer Brother        Tobacco Use    Smoking status: Former Smoker     Types: Cigarettes    Smokeless tobacco: Never Used    Tobacco comment: Stopped about 40 years ago   Substance and Sexual Activity    Alcohol use: No     Frequency: Monthly or less     Drinks per session: 1 or 2     Binge frequency: Never     Comment: Rarely, only on special occasions    Drug use: No    Sexual activity: Not on file     Review of Systems   Constitution: Negative for chills and decreased appetite.   HENT: Negative for congestion.    Cardiovascular: Negative for chest pain, irregular heartbeat and leg swelling.   Respiratory: Negative for cough and shortness of breath.    Endocrine: Negative for cold intolerance and heat intolerance.   Skin: Negative for rash.   Musculoskeletal: Negative for arthritis and back pain.   Gastrointestinal: Negative for abdominal pain, constipation and diarrhea.   Genitourinary: Negative for dysuria and hematuria.   Neurological: Negative for dizziness and headaches.   Psychiatric/Behavioral: Negative for altered mental status.     Objective:     Vital Signs (Most Recent):    Vital Signs (24h Range):  BP: ()/()   Arterial Line BP: ()/()           There is no height or weight on file to calculate BMI.            Physical Exam   Constitutional: He appears well-developed and well-nourished. No distress.   HENT:   Head:  Normocephalic.   Left Ear: External ear normal.   Eyes: Pupils are equal, round, and reactive to light. Right eye exhibits no discharge. Left eye exhibits no discharge.   Neck: Normal range of motion. No thyromegaly present.   Cardiovascular: Normal rate and regular rhythm. Exam reveals no friction rub.   No murmur heard.  Pulmonary/Chest: Effort normal and breath sounds normal. No respiratory distress.   Abdominal: Soft. Bowel sounds are normal. He exhibits no distension.   Musculoskeletal: Normal range of motion.         General: No edema.   Neurological: He is alert.       Significant Labs: All pertinent lab results from the last 24 hours have been reviewed.    Significant Imaging: Echocardiogram:   2D echo with color flow doppler:   Results for orders placed or performed during the hospital encounter of 07/20/16   2D echo with color flow doppler   Result Value Ref Range    QEF 60 55 - 65    Diastolic Dysfunction No     Narrative    Date of Procedure: 07/20/2016        TEST DESCRIPTION       Aorta: The aortic root is normal in size, measuring 2.8 cm at sinotubular junction and 3.2 cm at Sinuses of Valsalva. The proximal ascending aorta is normal in size, measuring 3.1 cm across.     Left Atrium: The left atrial volume index is severely enlarged, measuring 53.52 cc/m2.     Left Ventricle: The left ventricle is normal in size, with an end-diastolic diameter of 5.0 cm, and an end-systolic diameter of 2.8 cm. LV wall thickness is normal, with the septum and the posterior wall each measuring 1.1 cm across. Relative wall   thickness was increased at 0.44, and the LV mass index was 112.2 g/m2 consistent with concentric remodeling. Global left ventricular systolic function appears normal. Visually estimated ejection fraction is 60-65%. The LV Doppler derived stroke volume   equals 78.0 ccs.   The E/e'(lat) is -5, consistent with normal diastolic function.     Right Atrium: The right atrium is normal in size, measuring  6.1 cm in length and 4.7 cm in width in the apical view.     Right Ventricle: The right ventricle is normal in size measuring 4.5 cm at the base in the apical right ventricle-focused view. Global right ventricular systolic function appears normal. Tricuspid annular plane systolic excursion (TAPSE) is 3.2 cm.     Aortic Valve:  The aortic valve is normal in structure.     Mitral Valve:  The mitral valve is normal in structure.     Tricuspid Valve:  The tricuspid valve is normal in structure.     Pulmonary Valve:  The pulmonic valve is normal in structure.     IVC: IVC is normal in size and collapses > 50% with a sniff, suggesting normal right atrial pressure of 3 mmHg.     Intracavitary: There is no evidence of pericardial effusion, intracavity mass, thrombi, or vegetation.     Incidental finding of PFO.        CONCLUSIONS     1 - Normal left ventricular systolic function (EF 60-65%).     2 - Severe left atrial enlargement.     3 - Normal left ventricular diastolic function.     4 - Normal right ventricular systolic function .             This document has been electronically    SIGNED BY: Melissa Meyers MD On: 07/20/2016 10:10    and Transthoracic echo (TTE) complete (Cupid Only):   Results for orders placed or performed in visit on 06/12/20   Echo Color Flow Doppler? Yes   Result Value Ref Range    Ascending aorta 3.40 cm    STJ 3.10 cm    AV mean gradient 6 mmHg    Ao peak josé antonio 1.68 m/s    Ao VTI 35.57 cm    IVRT 91.34 msec    IVS 0.94 0.6 - 1.1 cm    LA size 4.45 cm    Left Atrium Major Axis 5.95 cm    Left Atrium Minor Axis 5.89 cm    LVIDD 4.36 3.5 - 6.0 cm    LVIDS 2.63 2.1 - 4.0 cm    LVOT diameter 2.01 cm    LVOT peak VTI 21.75 cm    PW 0.90 0.6 - 1.1 cm    MV Peak A José Antonio 0.49 m/s    E wave decelartion time 220.00 msec    MV Peak E José Antonio 0.58 m/s    PV Peak D José Antonio 0.49 m/s    PV Peak S José Antonio 0.49 m/s    RA Major Axis 5.52 cm    RA Width 3.93 cm    RVDD 4.17 cm    Sinus 3.40 cm    TAPSE 2.20 cm    TR Max José Antonio 2.69  m/s    TDI LATERAL 0.07 m/s    TDI SEPTAL 0.06 m/s    LA WIDTH 4.05 cm    PV PEAK VELOCITY 1.63 cm/s    LV Diastolic Volume 86.05 mL    LV Systolic Volume 25.29 mL    LVOT peak melissa 1.02 m/s    LV LATERAL E/E' RATIO 8.29 m/s    LV SEPTAL E/E' RATIO 9.67 m/s    FS 40 %    LA volume 90.69 cm3    LV mass 129.93 g    Left Ventricle Relative Wall Thickness 0.41 cm    AV valve area 1.94 cm2    AV Velocity Ratio 0.61     AV index (prosthetic) 0.61     E/A ratio 1.18     Mean e' 0.07 m/s    Pulm vein S/D ratio 1.00     LVOT area 3.2 cm2    LVOT stroke volume 68.98 cm3    AV peak gradient 11 mmHg    E/E' ratio 8.92 m/s    Triscuspid Valve Regurgitation Peak Gradient 29 mmHg    BSA 2.19 m2    LV Systolic Volume Index 11.7 mL/m2    LV Diastolic Volume Index 39.64 mL/m2    LA Volume Index 41.8 mL/m2    LV Mass Index 60 g/m2    Right Atrial Pressure (from IVC) 3 mmHg    RV S' 18 cm/s    TV rest pulmonary artery pressure 32 mmHg    LA Volume Index (Mod) 45.6 mL/m2    LA volume (mod) 99.00 cm3    Narrative    · Moderate left atrial enlargement.  · Normal left ventricular systolic function. The estimated ejection   fraction is 65%.  · Indeterminate left ventricular diastolic function.  · Normal right ventricular systolic function.  · Mild right atrial enlargement.  · Mild aortic regurgitation.  · Mild tricuspid regurgitation.  · Mild pulmonic regurgitation.  · Normal central venous pressure (3 mmHg).  · The estimated PA systolic pressure is 32 mmHg.  · There is a large cyst measuring 6.8 x 7.8 cm in the subcostal view.        Assessment and Plan:     Palpitations  74 year old male with recently evaluated Holter which showed very frequent PACs and nonsustained AT.  Recently increased nebivolol to 20 mg PO daily  ILR placement to monitor his rhythm     Informed Consent  -The risks of ILR implantation include but are not limited to: Bleeding, infection, heart rhythm abnormalities, allergic reactions, kidney injury, stroke and death.     -Informed consent was obtained & the patient is agreeable to proceed with the procedure.    Flavio Hendrickson MD  Cardiac Electrophysiology  [facility]

## 2020-07-17 NOTE — HPI
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 He was evaluated for being  shortness of breath, and evaluated with Holter monitor which showed very frequent PACs and nonsustained AT. No evidence of atrial fibrillation. He presented today for ILR implantation. Not taking anticoagulation.     TTE 6/2020  · Normal left ventricular systolic function. The estimated ejection fraction is 65%.  · Indeterminate left ventricular diastolic function.

## 2020-07-17 NOTE — HOSPITAL COURSE
Successful implantation of Loop Recorder. No complication. Discharged with no change in his medications.

## 2020-07-17 NOTE — PLAN OF CARE
Received report from SCOUT [facility]er. Patient s/p ILR, AAOx3. VSS, no c/o pain or discomfort at this time, resp even and unlabored. Dressing to chest wall is CDI. No active bleeding. No hematoma noted. Post procedure protocol reviewed with patient and patient's family. Understanding verbalized. Family members at bedside. Nurse call bell within reach. Will continue to monitor per post procedure protocol.

## 2020-07-20 ENCOUNTER — OFFICE VISIT (OUTPATIENT)
Dept: SLEEP MEDICINE | Facility: CLINIC | Age: 75
End: 2020-07-20
Payer: MEDICARE

## 2020-07-20 ENCOUNTER — TELEPHONE (OUTPATIENT)
Dept: ELECTROPHYSIOLOGY | Facility: CLINIC | Age: 75
End: 2020-07-20

## 2020-07-20 VITALS
SYSTOLIC BLOOD PRESSURE: 97 MMHG | WEIGHT: 204 LBS | DIASTOLIC BLOOD PRESSURE: 56 MMHG | HEIGHT: 72 IN | TEMPERATURE: 97 F | HEART RATE: 55 BPM | BODY MASS INDEX: 27.63 KG/M2

## 2020-07-20 DIAGNOSIS — G47.33 OSA (OBSTRUCTIVE SLEEP APNEA): Primary | ICD-10-CM

## 2020-07-20 DIAGNOSIS — I48.0 PAF (PAROXYSMAL ATRIAL FIBRILLATION): Primary | ICD-10-CM

## 2020-07-20 PROCEDURE — 1101F PR PT FALLS ASSESS DOC 0-1 FALLS W/OUT INJ PAST YR: ICD-10-PCS | Mod: S$GLB,,, | Performed by: PSYCHIATRY & NEUROLOGY

## 2020-07-20 PROCEDURE — 1101F PT FALLS ASSESS-DOCD LE1/YR: CPT | Mod: S$GLB,,, | Performed by: PSYCHIATRY & NEUROLOGY

## 2020-07-20 PROCEDURE — 3078F DIAST BP <80 MM HG: CPT | Mod: S$GLB,,, | Performed by: PSYCHIATRY & NEUROLOGY

## 2020-07-20 PROCEDURE — 3074F SYST BP LT 130 MM HG: CPT | Mod: S$GLB,,, | Performed by: PSYCHIATRY & NEUROLOGY

## 2020-07-20 PROCEDURE — 1126F PR PAIN SEVERITY QUANTIFIED, NO PAIN PRESENT: ICD-10-PCS | Mod: S$GLB,,, | Performed by: PSYCHIATRY & NEUROLOGY

## 2020-07-20 PROCEDURE — 99999 PR PBB SHADOW E&M-EST. PATIENT-LVL III: ICD-10-PCS | Mod: PBBFAC,,, | Performed by: PSYCHIATRY & NEUROLOGY

## 2020-07-20 PROCEDURE — 99214 OFFICE O/P EST MOD 30 MIN: CPT | Mod: S$GLB,,, | Performed by: PSYCHIATRY & NEUROLOGY

## 2020-07-20 PROCEDURE — 1159F MED LIST DOCD IN RCRD: CPT | Mod: S$GLB,,, | Performed by: PSYCHIATRY & NEUROLOGY

## 2020-07-20 PROCEDURE — 1159F PR MEDICATION LIST DOCUMENTED IN MEDICAL RECORD: ICD-10-PCS | Mod: S$GLB,,, | Performed by: PSYCHIATRY & NEUROLOGY

## 2020-07-20 PROCEDURE — 99214 PR OFFICE/OUTPT VISIT, EST, LEVL IV, 30-39 MIN: ICD-10-PCS | Mod: S$GLB,,, | Performed by: PSYCHIATRY & NEUROLOGY

## 2020-07-20 PROCEDURE — 3078F PR MOST RECENT DIASTOLIC BLOOD PRESSURE < 80 MM HG: ICD-10-PCS | Mod: S$GLB,,, | Performed by: PSYCHIATRY & NEUROLOGY

## 2020-07-20 PROCEDURE — 3008F BODY MASS INDEX DOCD: CPT | Mod: S$GLB,,, | Performed by: PSYCHIATRY & NEUROLOGY

## 2020-07-20 PROCEDURE — 3074F PR MOST RECENT SYSTOLIC BLOOD PRESSURE < 130 MM HG: ICD-10-PCS | Mod: S$GLB,,, | Performed by: PSYCHIATRY & NEUROLOGY

## 2020-07-20 PROCEDURE — 1126F AMNT PAIN NOTED NONE PRSNT: CPT | Mod: S$GLB,,, | Performed by: PSYCHIATRY & NEUROLOGY

## 2020-07-20 PROCEDURE — 3008F PR BODY MASS INDEX (BMI) DOCUMENTED: ICD-10-PCS | Mod: S$GLB,,, | Performed by: PSYCHIATRY & NEUROLOGY

## 2020-07-20 PROCEDURE — 99999 PR PBB SHADOW E&M-EST. PATIENT-LVL III: CPT | Mod: PBBFAC,,, | Performed by: PSYCHIATRY & NEUROLOGY

## 2020-07-20 RX ORDER — HYDROXYZINE PAMOATE 50 MG/1
50 CAPSULE ORAL NIGHTLY
COMMUNITY
Start: 2020-06-12 | End: 2021-04-23 | Stop reason: SDUPTHER

## 2020-07-20 RX ORDER — HYDROCHLOROTHIAZIDE 12.5 MG/1
12.5 TABLET ORAL DAILY
COMMUNITY
Start: 2020-06-19 | End: 2020-09-08 | Stop reason: SDUPTHER

## 2020-07-20 NOTE — TELEPHONE ENCOUNTER
Spoke with pt to schedule  3m f/u from ILR implant.  Pt asked that we make the appt and he will see it on [facility]

## 2020-07-20 NOTE — PROGRESS NOTES
The patient reports improved sleep continuity and daytime sleepiness on PAP. ESS today is 0/24.  Denies break through snoring. No dry mouth.   No Aerophagia or air hunger. No significant mask leaks and discomfort.      Started  having significant PACS - recently got an  Implanted device.    He has been taking Gabapentin for L-radiculopathy - walking helps him the best  APAP 7-18 cm H2O with Wisp mask    Therapy Event Summary   Date Range: 1/22/2020 - 7/19/2020   Hide             Compliance Summary   Days with Device Usage:   180 days   Percentage of Days >=4 Hours:   99.4%   Average Usage (Days Used):   8 hrs. 24 mins. 47 secs.   Average Usage (All Days):   8 hrs. 24 mins. 47 secs.   Apnea Indices   Average AHI:   3.6   Average OA Index:   0.3   Average CA Index:   0.1       Ventilator Statistics   Average Breath Rate:   N/A   Average % Patient Triggered Breaths:   N/A   Average Tidal Volume:   N/A   Average Minute Vent:   N/A   Average % Flow Limited Breaths:   N/A      Large Leak   Average Time in Large Leak:   1 hrs. 15 mins. 35 secs.   Average % of Night in Large Leak:   15.0%       Periodic Breathing   Average % of Night in PB:   1.8%       EPWORTH SLEEPINESS SCALE 7/18/2020   Sitting and reading 0   Watching TV 0   Sitting, inactive in a public place (e.g. a theatre or a meeting) 0   As a passenger in a car for an hour without a break 0   Lying down to rest in the afternoon when circumstances permit 0   Sitting and talking to someone 0   Sitting quietly after a lunch without alcohol 0   In a car, while stopped for a few minutes in traffic 0   Total score 0       PHQ9 4/1/2019   Little interest or pleasure in doing things: Not at all   Feeling down, depressed or hopeless: Not at all   Trouble falling asleep, staying asleep, or sleeping too much: More than half the days   Feeling tired or having little energy: Not at all   Poor appetite or overeating: Not at all   Feeling bad about yourself- or that you are a  failure or have let yourself or family down Not at all   Trouble concentrating on things, such as reading the newspaper or watching television: Not at all   Moving or speaking so slowly that other people could have noticed. Or the opposite- being so fidgety or restless that you have been moving around a lot more than usual: Not at all   Thoughts that you would be better off dead or hurting yourself in some way: Not at all   If you indicated you have experienced any of the aforementioned problems, how difficult have these problems made it for you to do your work, take care of things at home or get along with other people? Not difficult at all   Total Score 2     GAD7 11/5/2019 8/16/2019 4/1/2019   1. Feeling nervous, anxious, or on edge? 0 0 0   2. Not being able to stop or control worrying? 0 0 1   3. Worrying too much about different things? - - 1   4. Trouble relaxing? - - 1   5. Being so restless that it is hard to sit still? - - 0   6. Becoming easily annoyed or irritable? - - 0   7. Feeling afraid as if something awful might happen? - - 0   8. If you checked off any problems, how difficult have these problems made it for you to do your work, take care of things at home, or get along with other people? - - 0   TOMMY-7 Score - - 3             APAP 6-18 cm H2O  90% is 9 cm H2o        HISTORY OF PRESENT ILLNESS:  Maegan Cleary  was seen at the request of  No ref. provider found for the evaluation of  sleep apnea.      HISTORY OF PRESENT ILLNESS: Maegan Cleary is a 75 y.o. male is here for sleep evaluation.       CHIEF COMPLAINT:      The patient's complaints include excessive daytime sleepiness, snoring,  witnessed breathing pauses, , excessive daytime fatigue and , gasping for air in sleep,   interrupted sleep over the last  2 years.    Reports  dry mouth and sore throat  Reports nasal congestion   Denies  morning headaches  Reports  interrupted sleep  Denies RLS symptoms  Denies symptoms concerning for  "parasomnia    The ESS ([place] Sleepiness Score) taken on initial visit is 0 /24    INTERVAL HISTORY:    10/30/13: The patient has not presented any new complaints since the previous visit. He is very compliant on CPAP 7 cm with SM Wisp mask. In fact, he sometimes does not feel "enough air" on 7 cm H2O. He feels more rested on CPAP. It is easier to fall asleep with the machine.He is interested in trying EPAP patches for travelling.    CPAP pressure: 7 cm H2O  Mask comfort / fit:  Wisp SM - fits well    Pressure tolerance: "not enough air"   Humidification: OK   CPAP Interrogation:    Ave daily usage:8:45 hours /7days  Ave daily usage:8:30 hours /30days  Days >4 hours usage: 7 /7 days  Days >4 hours usage: 30/30 days   Machine condition: good       04/01/2019:     Wheezing since viral illness since a couple months ago. Started PPI, combined inhaler (long acting beta-mimetic and steroid) and recently Singulair.  Taking Vystaril and Melatonin and bedtime with good control of insomnia.  Denied daytime sleepiness.    Voice changes.  Reports improvement   8 cm H2O  Machine shows 8-9 hrs   Due for replacement.  AHI and leak are not available on his older generation CPAP.      05/27/2019:    APAP 7-15  90% - 10 , but sometimes 8    The patient reports improved sleep continuity and daytime sleepiness on PAP. ESS today is 4/24.  Denies break through snoring. No dry mouth.   Therapy Event Summary Date Range: 4/27/2019 - 5/26/2019     Hide      Compliance Summary  Apnea Indices  Ventilator Statistics    Days with Device Usage:  30 days  Average AHI:  1.7  Average Breath Rate:  16.6 bpm    Percentage of Days >=4 Hours:  100.0%  Average OA Index:  0.3  Average % Patient Triggered Breaths:  N/A    Average Usage (Days Used):  8 hrs. 8 mins. 9 secs.  Average CA Index:  0.2  Average Tidal Volume:  458.3 ml    Average Usage (All Days):  8 hrs. 8 mins. 9 secs.    Average Minute Vent:  N/A        Large Leak  Periodic Breathing   "   Average Time in Large Leak:  26 secs.  Average % of Night in PB:  1.7%     Average % of Night in Large Leak:  0.1%                EPWORTH SLEEPINESS SCALE 5/27/2019   Sitting and reading 0   Watching TV 0   Sitting, inactive in a public place (e.g. a theatre or a meeting) 0   As a passenger in a car for an hour without a break 0   Lying down to rest in the afternoon when circumstances permit 2   Sitting and talking to someone 0   Sitting quietly after a lunch without alcohol 2   In a car, while stopped for a few minutes in traffic 0   Total score 4       PHQ9 4/1/2019   Little interest or pleasure in doing things: Not at all   Feeling down, depressed or hopeless: Not at all   Trouble falling asleep, staying asleep, or sleeping too much: More than half the days   Feeling tired or having little energy: Not at all   Poor appetite or overeating: Not at all   Feeling bad about yourself- or that you are a failure or have let yourself or family down Not at all   Trouble concentrating on things, such as reading the newspaper or watching television: Not at all   Moving or speaking so slowly that other people could have noticed. Or the opposite- being so fidgety or restless that you have been moving around a lot more than usual: Not at all   Thoughts that you would be better off dead or hurting yourself in some way: Not at all   If you indicated you have experienced any of the aforementioned problems, how difficult have these problems made it for you to do your work, take care of things at home or get along with other people? Not difficult at all   Total Score 2     GAD7 11/5/2019   1. Feeling nervous, anxious, or on edge? 0   2. Not being able to stop or control worrying? 0   3. Worrying too much about different things? -   4. Trouble relaxing? -   5. Being so restless that it is hard to sit still? -   6. Becoming easily annoyed or irritable? -   7. Feeling afraid as if something awful might happen? -   8. If you checked  off any problems, how difficult have these problems made it for you to do your work, take care of things at home, or get along with other people? -   TOMMY-7 Score -                     SLEEP ROUTINE:      Bed partner: his wife  Time to bed: 9:30-10 PM  Sleep onset latency: 40-60 min  Disruptions or awakenings: once 3- 5 AM  Time to fall back into sleep: not long with CPAP  Wakeup time: 6 AM   Perceived sleep quality: 2-3/5  Perceived total sleep time:  5  hours.  Daytime naps: none  Weekend sleep routine: 10-11 PM - 6 am  Exercise routine: not every day        PSG 1/30/13: Significant Obstructive sleep apnea (CRIS) with AHI (apnea hypopnea Index) of 5.1 and SaO2 of 87%  (weight  198).  CPAP titration on 2/22/13 Effective control of respiratory events was achieved at 2.9 cm of H2O. Weight 198 lbs.        PAST MEDICAL HISTORY:    Active Ambulatory Problems     Diagnosis Date Noted    Essential hypertension     Hyperlipidemia     Insomnia     CRIS on CPAP 01/23/2013    Premature beats 07/12/2016    Pulmonary nodule 02/22/2019    Diastolic heart failure 02/25/2019    Decreased ROM of lumbar spine 07/12/2019    Acute midline low back pain without sciatica 07/12/2019    Spinal stenosis of lumbar region with neurogenic claudication 08/19/2019    DDD (degenerative disc disease), lumbar 08/19/2019    Spondylosis without myelopathy 08/23/2019    Trochanteric bursitis of right hip 09/19/2019    Overweight (BMI 25.0-29.9) 12/03/2019    Palpitations 07/17/2020     Resolved Ambulatory Problems     Diagnosis Date Noted    Cough     Laryngopharyngeal reflux (LPR) 02/25/2019    Lumbar spondylosis 08/19/2019    Chronic pain 08/26/2019    CKD (chronic kidney disease) stage 3, GFR 30-59 ml/min 12/03/2019     Past Medical History:   Diagnosis Date    Hypertension     CRIS (obstructive sleep apnea)     Vitamin D deficiency                 PAST SURGICAL HISTORY:    Past Surgical History:   Procedure Laterality Date     BASAL CELL CARCINOMA EXCISION      cataract surgery      Colorectal Surgery  11/07/2019    Anal Sphincterectomy    INSERTION OF IMPLANTABLE LOOP RECORDER N/A 7/17/2020    Procedure: Insertion, Implantable Loop Recorder;  Surgeon: James Araya MD;  Location: [facility] EP LAB;  Service: Cardiology;  Laterality: N/A;  PAF, ILR, MDT, Local, SK, 3 Prep    TRANSFORAMINAL EPIDURAL INJECTION OF STEROID Right 8/26/2019    Procedure: INJECTION, STEROID, EPIDURAL, TRANSFORAMINAL APPROACH;  Surgeon: Maikel Millan MD;  Location: [facility] PAIN MGT;  Service: Pain Management;  Laterality: Right;  RT TF JOSE MARIA L4-5, LF-S1  *add Monday per Dr Millan         FAMILY HISTORY:                Family History   Problem Relation Age of Onset    Prostate cancer Brother         prostate problems, unknown cancer    Cancer Brother     Heart attack Brother 72    Hypertension Brother        SOCIAL HISTORY:          Tobacco:   Social History     Tobacco Use   Smoking Status Former Smoker    Types: Cigarettes   Smokeless Tobacco Never Used   Tobacco Comment    Stopped about 40 years ago       alcohol use:    Social History     Substance and Sexual Activity   Alcohol Use No    Frequency: Monthly or less    Drinks per session: 1 or 2    Binge frequency: Never    Comment: Rarely, only on special occasions                 Occupation:Internal Medicine doctor in [place]    ALLERGIES:  Review of patient's allergies indicates:  No Known Allergies    CURRENT MEDICATIONS:    Current Outpatient Medications   Medication Sig Dispense Refill    cholecalciferol, vitamin D3, (VITAMIN D3) 2,000 unit Tab Take 1 tablet by mouth once daily.       cyanocobalamin (VITAMIN B-12) 2000 MCG tablet Take 2,000 mcg by mouth once daily.       gabapentin (NEURONTIN) 300 MG capsule Take 300 mg by mouth once daily. Patient takes as needed  3    hydroCHLOROthiazide (HYDRODIURIL) 12.5 MG Tab Take 12.5 mg by mouth once daily.      hydrOXYzine pamoate (VISTARIL) 50 MG Cap  Take 50 mg by mouth nightly.      nebivolol (BYSTOLIC) 10 MG Tab Take 1 tablet (10 mg total) by mouth once daily. (Patient taking differently: Take 20 mg by mouth once daily. ) 90 tablet 3    rosuvastatin (CRESTOR) 10 MG tablet Take 1 tablet (10 mg total) by mouth once daily. 90 tablet 3    SAW PALMETTO ORAL Take 1 tablet by mouth once daily.      losartan (COZAAR) 50 MG tablet Take 50 mg by mouth once daily.       No current facility-administered medications for this visit.      Facility-Administered Medications Ordered in Other Visits   Medication Dose Route Frequency Provider Last Rate Last Dose    0.9%  NaCl infusion   Intravenous Once Allison Roa NP                          REVIEW OF SYSTEMS:   Sleep related symptoms as per HPI    reports weight gain 10 lbs over the last 6 months  Denies dyspnea  Reports palpitations  Reports acid reflux   Denied nocturnal polyuria  Reports occasional  mood diturbance  Denies  anemia  Reports occasional  muscle pain (neck)    Otherwise, a balance of 10 systems reviewed is negative.    PHYSICAL EXAM:  BP (!) 97/56 (BP Location: Left arm, Patient Position: Sitting, BP Method: Large (Automatic))   Pulse (!) 55   Temp 96.9 °F (36.1 °C)   Ht 6' (1.829 m)   Wt 92.5 kg (204 lb)   BMI 27.67 kg/m²   GENERAL: Normal development, well groomed.  HEENT:   HEENT:  Conjunctivae are non-erythematous; MP III-IV  NECK: Supple. No thyromegaly. No palpable nodes.     SKIN: On face and neck: No abrasions, no rashes, no lesions.  No subcutaneous nodules are palpable.  RESPIRATORY: Chest is clear to auscultation.  Normal chest expansion and non-labored breathing at rest.  CARDIOVASCULAR: Normal S1, S2.  No murmurs, gallops or rubs. No carotid bruits bilaterally.  EXTREMITIES: No edema. No clubbing. No cyanosis. Station normal. Gait normal.        NEURO/PSYCH: Oriented to time, place and person. Normal attention span and concentration. Affect is full. Mood is  normal      ASSESSMENT:    1. CRIS. The patient symptomatically has  snoring,  witnessed breathing pauses, gasping for air in sleep and interrupted sleep . The patient has medical co-morbidities of hyperlipidemia and hypertension,  which can be worsened by CRIS.  Benefiting from CPAP use in terms of sleep continuity and daytime sleepiness.       2. Sleep maintenance insomnia NEC, likelImproved on a new APAP + Melatonin and occasional hydroxyzine.    y multifactorial: significantly improved on CPAP; he does not require a sleeping aide.   4. Insomnia - combine Vystaril + Melatonin.     PLAN:    Order APAP 7-15  Continue Wisp SM mask.  Trial pack of EPAP will be faxed to [facility].    Education: During our discussion today, we talked about the etiology of obstructive sleep apnea as well as the potential ramifications of untreated sleep apnea, which could include daytime sleepiness, hypertension, heart disease and/or stroke.  We discussed potential treatment options, which could include weight loss, body positioning, continuous positive airway pressure (CPAP), or referral for surgical consideration. Meanwhile, he  is urged to avoid supine sleep, weight gain and alcoholic beverages since all of these can worsen CRIS.       Precautions: The patient was advised to abstain from driving should he feel sleepy or drowsy.  Follow up: I will see the patient back in 3-4 months.    Thank you for allowing me the opportunity to participate in the care of your patient.

## 2020-07-24 ENCOUNTER — CLINICAL SUPPORT (OUTPATIENT)
Dept: CARDIOLOGY | Facility: HOSPITAL | Age: 75
End: 2020-07-24
Attending: INTERNAL MEDICINE
Payer: MEDICARE

## 2020-07-24 DIAGNOSIS — R00.2 PALPITATIONS: ICD-10-CM

## 2020-07-24 PROCEDURE — 93291 INTERROG DEV EVAL SCRMS IP: CPT | Mod: 26,,, | Performed by: INTERNAL MEDICINE

## 2020-07-24 PROCEDURE — 93291 INTERROG DEV EVAL SCRMS IP: CPT

## 2020-07-24 PROCEDURE — 93291 CARDIAC DEVICE CHECK - IN CLINIC & HOSPITAL: ICD-10-PCS | Mod: 26,,, | Performed by: INTERNAL MEDICINE

## 2020-07-30 ENCOUNTER — TELEPHONE (OUTPATIENT)
Dept: ELECTROPHYSIOLOGY | Facility: CLINIC | Age: 75
End: 2020-07-30

## 2020-07-30 NOTE — TELEPHONE ENCOUNTER
Spoke with pt to offer virtual visit option, which he agreed to do.  He would be able to do the virit between 7:30 and 8, so we will discuss appt time when it gets closer to his appointment day.  Will need to ensure that he is able to do the virtual visit on his phone and review meds.  ----- Message from Paty Noble sent at 7/28/2020  1:48 PM CDT -----  Patient will need hosp f/u in Oct/Nov s/p ILR implant. The option days listed below are what the patient request. I don't know if it fits in line with what Dr. GALLO has available.     Thanks  ----- Message -----  From: James Araya MD  Sent: 7/24/2020   3:47 PM CDT  To: Paty Noble    I don't have afternoon clinic. I hope you didn't give him that impression.  ----- Message -----  From: Paty Noble  Sent: 7/24/2020  10:42 AM CDT  To: James Araya MD    Good morning,    Dr. 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 He is off on Mon and Friday or would like the latest appt you have on Wednedsay--say 3-4 in the afternoon.    He only wants to see you. I saw him today s/p LINQ implant.       Thanks,  Tamra

## 2020-08-23 ENCOUNTER — CLINICAL SUPPORT (OUTPATIENT)
Dept: CARDIOLOGY | Facility: HOSPITAL | Age: 75
End: 2020-08-23
Attending: INTERNAL MEDICINE
Payer: MEDICARE

## 2020-08-23 DIAGNOSIS — Z95.818 PRESENCE OF OTHER CARDIAC IMPLANTS AND GRAFTS: ICD-10-CM

## 2020-08-23 PROCEDURE — 93298 REM INTERROG DEV EVAL SCRMS: CPT | Mod: ,,, | Performed by: INTERNAL MEDICINE

## 2020-08-23 PROCEDURE — G2066 INTER DEVC REMOTE 30D: HCPCS | Performed by: INTERNAL MEDICINE

## 2020-08-23 PROCEDURE — 93298 CARDIAC DEVICE CHECK - REMOTE: ICD-10-PCS | Mod: ,,, | Performed by: INTERNAL MEDICINE

## 2020-08-24 ENCOUNTER — LAB VISIT (OUTPATIENT)
Dept: LAB | Facility: HOSPITAL | Age: 75
End: 2020-08-24
Attending: INTERNAL MEDICINE
Payer: MEDICARE

## 2020-08-24 DIAGNOSIS — I25.10 CORONARY ARTERY CALCIFICATION: ICD-10-CM

## 2020-08-24 DIAGNOSIS — I25.84 CORONARY ARTERY CALCIFICATION: ICD-10-CM

## 2020-08-24 LAB
ALBUMIN SERPL BCP-MCNC: 3.8 G/DL (ref 3.5–5.2)
ALP SERPL-CCNC: 71 U/L (ref 55–135)
ALT SERPL W/O P-5'-P-CCNC: 18 U/L (ref 10–44)
ANION GAP SERPL CALC-SCNC: 6 MMOL/L (ref 8–16)
AST SERPL-CCNC: 20 U/L (ref 10–40)
BILIRUB SERPL-MCNC: 0.5 MG/DL (ref 0.1–1)
BUN SERPL-MCNC: 17 MG/DL (ref 8–23)
CALCIUM SERPL-MCNC: 8.9 MG/DL (ref 8.7–10.5)
CHLORIDE SERPL-SCNC: 105 MMOL/L (ref 95–110)
CHOLEST SERPL-MCNC: 123 MG/DL (ref 120–199)
CHOLEST/HDLC SERPL: 4.4 {RATIO} (ref 2–5)
CO2 SERPL-SCNC: 28 MMOL/L (ref 23–29)
CREAT SERPL-MCNC: 1.1 MG/DL (ref 0.5–1.4)
EST. GFR  (AFRICAN AMERICAN): >60 ML/MIN/1.73 M^2
EST. GFR  (NON AFRICAN AMERICAN): >60 ML/MIN/1.73 M^2
GLUCOSE SERPL-MCNC: 110 MG/DL (ref 70–110)
HDLC SERPL-MCNC: 28 MG/DL (ref 40–75)
HDLC SERPL: 22.8 % (ref 20–50)
LDLC SERPL CALC-MCNC: 40.8 MG/DL (ref 63–159)
NONHDLC SERPL-MCNC: 95 MG/DL
POTASSIUM SERPL-SCNC: 3.8 MMOL/L (ref 3.5–5.1)
PROT SERPL-MCNC: 7 G/DL (ref 6–8.4)
SODIUM SERPL-SCNC: 139 MMOL/L (ref 136–145)
TRIGL SERPL-MCNC: 271 MG/DL (ref 30–150)

## 2020-08-24 PROCEDURE — 36415 COLL VENOUS BLD VENIPUNCTURE: CPT

## 2020-08-24 PROCEDURE — 80061 LIPID PANEL: CPT

## 2020-08-24 PROCEDURE — 80053 COMPREHEN METABOLIC PANEL: CPT

## 2020-08-31 RX ORDER — NEBIVOLOL 20 MG/1
1 TABLET ORAL DAILY
Qty: 90 TABLET | Refills: 3 | Status: SHIPPED | OUTPATIENT
Start: 2020-08-31 | End: 2021-09-13

## 2020-08-31 RX ORDER — NEBIVOLOL 10 MG/1
10 TABLET ORAL DAILY
Qty: 90 TABLET | Refills: 3 | Status: CANCELLED | OUTPATIENT
Start: 2020-08-31

## 2020-08-31 NOTE — TELEPHONE ENCOUNTER
He Says another dr increased rx to 20mg.  Needs refills to [facility].  He will see you in October next.

## 2020-09-08 ENCOUNTER — OFFICE VISIT (OUTPATIENT)
Dept: INTERNAL MEDICINE | Facility: CLINIC | Age: 75
End: 2020-09-08
Payer: MEDICARE

## 2020-09-08 DIAGNOSIS — E78.5 DYSLIPIDEMIA: ICD-10-CM

## 2020-09-08 DIAGNOSIS — R00.2 PALPITATIONS: ICD-10-CM

## 2020-09-08 DIAGNOSIS — I10 ESSENTIAL HYPERTENSION: Primary | ICD-10-CM

## 2020-09-08 DIAGNOSIS — M51.36 DDD (DEGENERATIVE DISC DISEASE), LUMBAR: ICD-10-CM

## 2020-09-08 DIAGNOSIS — I49.49 PREMATURE BEATS: ICD-10-CM

## 2020-09-08 PROCEDURE — 1159F MED LIST DOCD IN RCRD: CPT | Mod: ,,, | Performed by: INTERNAL MEDICINE

## 2020-09-08 PROCEDURE — 1159F PR MEDICATION LIST DOCUMENTED IN MEDICAL RECORD: ICD-10-PCS | Mod: ,,, | Performed by: INTERNAL MEDICINE

## 2020-09-08 PROCEDURE — 99213 PR OFFICE/OUTPT VISIT, EST, LEVL III, 20-29 MIN: ICD-10-PCS | Mod: 95,,, | Performed by: INTERNAL MEDICINE

## 2020-09-08 PROCEDURE — 1101F PR PT FALLS ASSESS DOC 0-1 FALLS W/OUT INJ PAST YR: ICD-10-PCS | Mod: ,,, | Performed by: INTERNAL MEDICINE

## 2020-09-08 PROCEDURE — 1101F PT FALLS ASSESS-DOCD LE1/YR: CPT | Mod: ,,, | Performed by: INTERNAL MEDICINE

## 2020-09-08 PROCEDURE — 99213 OFFICE O/P EST LOW 20 MIN: CPT | Mod: 95,,, | Performed by: INTERNAL MEDICINE

## 2020-09-08 RX ORDER — HYDROCHLOROTHIAZIDE 12.5 MG/1
12.5 TABLET ORAL DAILY
Qty: 90 TABLET | Refills: 3 | Status: SHIPPED | OUTPATIENT
Start: 2020-09-08 | End: 2021-03-25 | Stop reason: SDUPTHER

## 2020-09-08 NOTE — PROGRESS NOTES
Subjective:       Patient ID: Maegna Cleary is a 75 y.o. male.    Chief Complaint: Follow-up    HPI   This is a telemedicine video visit.  The patient reports he is feeling well today.  He reports he has been gaining some weight.  He has not been as physically active.  His medical practice has been limited to telemedicine encounters.    He had been experiencing increasing episodes of palpitations.  Premature beats have been noted.  He has been undergoing cardiac workup.  An implantable loop recorder was placed in July 2020.  The patient is being followed by Dr. James Rivas of the electrophysiology service.    The patient reports his blood pressure has been well controlled his current regimen hydrochlorothiazide, losartan, and Bystolic.  He needs a prescription sent today for the hydrochlorothiazide 12.5 mg.  He was recently placed on Crestor.  He is tolerating the medication well without side effects.    The patient intermittently uses saw palmetto  He is also taking supplemental vitamin-D and B-12.    Review of Systems   Constitutional: Positive for activity change and unexpected weight change. Negative for chills and fever.   Respiratory: Negative for cough, chest tightness and shortness of breath.    Cardiovascular: Positive for palpitations. Negative for chest pain.   Gastrointestinal: Negative for nausea and vomiting.   Neurological: Negative for syncope, weakness, light-headedness and headaches.   Psychiatric/Behavioral: Negative for dysphoric mood. The patient is not nervous/anxious.          Objective:      Physical Exam  Constitutional:       General: He is not in acute distress.     Appearance: Normal appearance.   Neurological:      Mental Status: He is alert and oriented to person, place, and time.   Psychiatric:         Mood and Affect: Mood normal.         Behavior: Behavior normal.         Thought Content: Thought content normal.       No additional physical findings could be obtained via this  virtual visit.    Results for orders placed or performed in visit on 08/24/20   Lipid Panel   Result Value Ref Range    Cholesterol 123 120 - 199 mg/dL    Triglycerides 271 (H) 30 - 150 mg/dL    HDL 28 (L) 40 - 75 mg/dL    LDL Cholesterol 40.8 (L) 63.0 - 159.0 mg/dL    Hdl/Cholesterol Ratio 22.8 20.0 - 50.0 %    Total Cholesterol/HDL Ratio 4.4 2.0 - 5.0    Non-HDL Cholesterol 95 mg/dL   Comprehensive metabolic panel   Result Value Ref Range    Sodium 139 136 - 145 mmol/L    Potassium 3.8 3.5 - 5.1 mmol/L    Chloride 105 95 - 110 mmol/L    CO2 28 23 - 29 mmol/L    Glucose 110 70 - 110 mg/dL    BUN, Bld 17 8 - 23 mg/dL    Creatinine 1.1 0.5 - 1.4 mg/dL    Calcium 8.9 8.7 - 10.5 mg/dL    Total Protein 7.0 6.0 - 8.4 g/dL    Albumin 3.8 3.5 - 5.2 g/dL    Total Bilirubin 0.5 0.1 - 1.0 mg/dL    Alkaline Phosphatase 71 55 - 135 U/L    AST 20 10 - 40 U/L    ALT 18 10 - 44 U/L    Anion Gap 6 (L) 8 - 16 mmol/L    eGFR if African American >60 >60 mL/min/1.73 m^2    eGFR if non African American >60 >60 mL/min/1.73 m^2       Assessment:       1. Essential hypertension    2. Dyslipidemia    3. Premature beats    4. DDD (degenerative disc disease), lumbar    5. Palpitations        Plan:     Dr. fierro was seen today for of conditions which include hypertension, dyslipidemia, and palpitations.  The patient is clinically stable on current medications.  He is being followed regularly by Cardiology.  The patient will be seen for annual physical examination in January 2021 as discussed.      Diagnoses and all orders for this visit:    Essential hypertension    Dyslipidemia    Premature beats    DDD (degenerative disc disease), lumbar    Palpitations    Other orders  -     hydroCHLOROthiazide (HYDRODIURIL) 12.5 MG Tab; Take 1 tablet (12.5 mg total) by mouth once daily.

## 2020-09-22 ENCOUNTER — CLINICAL SUPPORT (OUTPATIENT)
Dept: CARDIOLOGY | Facility: HOSPITAL | Age: 75
End: 2020-09-22
Payer: MEDICARE

## 2020-09-22 DIAGNOSIS — Z95.818 PRESENCE OF OTHER CARDIAC IMPLANTS AND GRAFTS: ICD-10-CM

## 2020-09-22 PROCEDURE — 93298 CARDIAC DEVICE CHECK - REMOTE: ICD-10-PCS | Mod: ,,, | Performed by: INTERNAL MEDICINE

## 2020-09-22 PROCEDURE — 93298 REM INTERROG DEV EVAL SCRMS: CPT | Mod: ,,, | Performed by: INTERNAL MEDICINE

## 2020-09-22 PROCEDURE — G2066 INTER DEVC REMOTE 30D: HCPCS | Performed by: INTERNAL MEDICINE

## 2020-10-20 ENCOUNTER — TELEPHONE (OUTPATIENT)
Dept: ELECTROPHYSIOLOGY | Facility: CLINIC | Age: 75
End: 2020-10-20

## 2020-10-20 RX ORDER — GABAPENTIN 300 MG/1
300 CAPSULE ORAL
COMMUNITY
End: 2021-03-03

## 2020-10-20 RX ORDER — LOSARTAN POTASSIUM 50 MG/1
50 TABLET ORAL DAILY
COMMUNITY
End: 2021-06-28 | Stop reason: SDUPTHER

## 2020-10-20 NOTE — TELEPHONE ENCOUNTER
Spoke with pt to review meds prior to appt with SK tomorrow morning. Will move appt to 8am tomorrow so that he can begin visit at 7:45.  We will be in contact if there are any issues.

## 2020-10-20 NOTE — PROGRESS NOTES
Subjective:    Patient ID:  Maegan Cleary is a 75 y.o. male who presents for follow-up of No chief complaint on file.      HPI 76 yo male with palpitations, Htn, Sleep apnea, spinal stenosis.    The patient location is: Home  The chief complaint leading to consultation is: Palpitations    Visit type: audiovisual    Face to Face time with patient: 10 minutes  18 minutes of total time spent on the encounter, which includes face to face time and non-face to face time preparing to see the patient (eg, review of tests), Obtaining and/or reviewing separately obtained history, Documenting clinical information in the electronic or other health record, Independently interpreting results (not separately reported) and communicating results to the patient/family/caregiver, or Care coordination (not separately reported).         Each patient to whom he or she provides medical services by telemedicine is:  (1) informed of the relationship between the physician and patient and the respective role of any other health care provider with respect to management of the patient; and (2) notified that he or she may decline to receive medical services by telemedicine and may withdraw from such care at any time.      Noted increasing palpitations.  ILR implanted 7/17/10.  Bystolic initiated.  Feeling well overall since initiation of Bystolic. Walking more. Less palpitations.  ILR reveals no significant arrhythmias.          Review of Systems   Constitution: Negative. Negative for fever and malaise/fatigue.   HENT: Negative for congestion and sore throat.    Cardiovascular: Negative for chest pain, dyspnea on exertion, irregular heartbeat, leg swelling, near-syncope, orthopnea, palpitations, paroxysmal nocturnal dyspnea and syncope.   Respiratory: Negative for cough and shortness of breath.    Gastrointestinal: Negative for abdominal pain, constipation and diarrhea.   Neurological: Negative for dizziness, light-headedness and weakness.    Psychiatric/Behavioral: Negative for depression. The patient is not nervous/anxious.    All other systems reviewed and are negative.           Assessment:       1. Palpitations    2. Essential hypertension    3. Spinal stenosis of lumbar region with neurogenic claudication    4. CRIS on CPAP         Plan:           Doing well.  Continue Bystolic.  F/u with monitoring as scheduled, with me in one year.

## 2020-10-21 ENCOUNTER — TELEPHONE (OUTPATIENT)
Dept: ELECTROPHYSIOLOGY | Facility: CLINIC | Age: 75
End: 2020-10-21

## 2020-10-21 ENCOUNTER — OFFICE VISIT (OUTPATIENT)
Dept: ELECTROPHYSIOLOGY | Facility: CLINIC | Age: 75
End: 2020-10-21
Payer: MEDICARE

## 2020-10-21 DIAGNOSIS — M48.062 SPINAL STENOSIS OF LUMBAR REGION WITH NEUROGENIC CLAUDICATION: ICD-10-CM

## 2020-10-21 DIAGNOSIS — G47.33 OSA ON CPAP: ICD-10-CM

## 2020-10-21 DIAGNOSIS — R00.2 PALPITATIONS: Primary | ICD-10-CM

## 2020-10-21 DIAGNOSIS — I10 ESSENTIAL HYPERTENSION: ICD-10-CM

## 2020-10-21 PROCEDURE — 1159F MED LIST DOCD IN RCRD: CPT | Mod: ,,, | Performed by: INTERNAL MEDICINE

## 2020-10-21 PROCEDURE — 1101F PR PT FALLS ASSESS DOC 0-1 FALLS W/OUT INJ PAST YR: ICD-10-PCS | Mod: ,,, | Performed by: INTERNAL MEDICINE

## 2020-10-21 PROCEDURE — 1159F PR MEDICATION LIST DOCUMENTED IN MEDICAL RECORD: ICD-10-PCS | Mod: ,,, | Performed by: INTERNAL MEDICINE

## 2020-10-21 PROCEDURE — 99213 OFFICE O/P EST LOW 20 MIN: CPT | Mod: 95,,, | Performed by: INTERNAL MEDICINE

## 2020-10-21 PROCEDURE — 99213 PR OFFICE/OUTPT VISIT, EST, LEVL III, 20-29 MIN: ICD-10-PCS | Mod: 95,,, | Performed by: INTERNAL MEDICINE

## 2020-10-21 PROCEDURE — 1101F PT FALLS ASSESS-DOCD LE1/YR: CPT | Mod: ,,, | Performed by: INTERNAL MEDICINE

## 2020-10-22 ENCOUNTER — CLINICAL SUPPORT (OUTPATIENT)
Dept: CARDIOLOGY | Facility: HOSPITAL | Age: 75
End: 2020-10-22
Payer: MEDICARE

## 2020-10-22 DIAGNOSIS — Z95.818 PRESENCE OF OTHER CARDIAC IMPLANTS AND GRAFTS: ICD-10-CM

## 2020-10-22 PROCEDURE — 93298 CARDIAC DEVICE CHECK - REMOTE: ICD-10-PCS | Mod: ,,, | Performed by: INTERNAL MEDICINE

## 2020-10-22 PROCEDURE — G2066 INTER DEVC REMOTE 30D: HCPCS | Performed by: INTERNAL MEDICINE

## 2020-10-22 PROCEDURE — 93298 REM INTERROG DEV EVAL SCRMS: CPT | Mod: ,,, | Performed by: INTERNAL MEDICINE

## 2020-11-19 ENCOUNTER — PES CALL (OUTPATIENT)
Dept: ADMINISTRATIVE | Facility: CLINIC | Age: 75
End: 2020-11-19

## 2020-11-21 ENCOUNTER — CLINICAL SUPPORT (OUTPATIENT)
Dept: CARDIOLOGY | Facility: HOSPITAL | Age: 75
End: 2020-11-21
Payer: MEDICARE

## 2020-11-21 DIAGNOSIS — Z95.818 PRESENCE OF OTHER CARDIAC IMPLANTS AND GRAFTS: ICD-10-CM

## 2020-11-21 PROCEDURE — G2066 INTER DEVC REMOTE 30D: HCPCS | Performed by: INTERNAL MEDICINE

## 2020-11-21 PROCEDURE — 93298 CARDIAC DEVICE CHECK - REMOTE: ICD-10-PCS | Mod: ,,, | Performed by: INTERNAL MEDICINE

## 2020-11-21 PROCEDURE — 93298 REM INTERROG DEV EVAL SCRMS: CPT | Mod: ,,, | Performed by: INTERNAL MEDICINE

## 2020-12-21 ENCOUNTER — PATIENT MESSAGE (OUTPATIENT)
Dept: INTERNAL MEDICINE | Facility: CLINIC | Age: 75
End: 2020-12-21

## 2020-12-21 ENCOUNTER — CLINICAL SUPPORT (OUTPATIENT)
Dept: CARDIOLOGY | Facility: HOSPITAL | Age: 75
End: 2020-12-21
Payer: MEDICARE

## 2020-12-21 DIAGNOSIS — Z95.818 PRESENCE OF OTHER CARDIAC IMPLANTS AND GRAFTS: ICD-10-CM

## 2020-12-21 DIAGNOSIS — Z23 HIGH PRIORITY FOR 2019 NOVEL CORONAVIRUS VACCINATION: Chronic | ICD-10-CM

## 2020-12-21 PROCEDURE — 93298 CARDIAC DEVICE CHECK - REMOTE: ICD-10-PCS | Mod: ,,, | Performed by: INTERNAL MEDICINE

## 2020-12-21 PROCEDURE — G2066 INTER DEVC REMOTE 30D: HCPCS | Performed by: INTERNAL MEDICINE

## 2020-12-21 PROCEDURE — 93298 REM INTERROG DEV EVAL SCRMS: CPT | Mod: ,,, | Performed by: INTERNAL MEDICINE

## 2020-12-21 NOTE — TELEPHONE ENCOUNTER
"email said   " Good morning Dr Carr  How are you ?  I Hope you and your family are safe .  I am waiting to get the vaccine , I have retired Temporary because of not being able to see my patient in person  I appreciate it if you place me on your list to get the vaccine once available , I am planning to go back to my   clinic once I get the vaccine   I wish you and your Family A Emily Mcginnis and Sydni New Year   P S ;I am 75 year with heart issues  "    Please update chart problem list for vaccine.    Thanks rafaela"

## 2021-01-04 ENCOUNTER — TELEPHONE (OUTPATIENT)
Dept: INTERNAL MEDICINE | Facility: CLINIC | Age: 76
End: 2021-01-04

## 2021-01-04 ENCOUNTER — PATIENT MESSAGE (OUTPATIENT)
Dept: INTERNAL MEDICINE | Facility: CLINIC | Age: 76
End: 2021-01-04

## 2021-01-10 ENCOUNTER — IMMUNIZATION (OUTPATIENT)
Dept: INTERNAL MEDICINE | Facility: CLINIC | Age: 76
End: 2021-01-10
Payer: MEDICARE

## 2021-01-10 DIAGNOSIS — Z23 NEED FOR VACCINATION: ICD-10-CM

## 2021-01-10 PROCEDURE — 91300 COVID-19, MRNA, LNP-S, PF, 30 MCG/0.3 ML DOSE VACCINE: CPT | Mod: PBBFAC | Performed by: FAMILY MEDICINE

## 2021-01-20 ENCOUNTER — CLINICAL SUPPORT (OUTPATIENT)
Dept: CARDIOLOGY | Facility: HOSPITAL | Age: 76
End: 2021-01-20
Payer: MEDICARE

## 2021-01-20 DIAGNOSIS — Z95.818 PRESENCE OF OTHER CARDIAC IMPLANTS AND GRAFTS: ICD-10-CM

## 2021-01-20 PROCEDURE — 93298 CARDIAC DEVICE CHECK - REMOTE: ICD-10-PCS | Mod: ,,, | Performed by: INTERNAL MEDICINE

## 2021-01-20 PROCEDURE — G2066 INTER DEVC REMOTE 30D: HCPCS | Performed by: INTERNAL MEDICINE

## 2021-01-20 PROCEDURE — 93298 REM INTERROG DEV EVAL SCRMS: CPT | Mod: ,,, | Performed by: INTERNAL MEDICINE

## 2021-01-31 ENCOUNTER — IMMUNIZATION (OUTPATIENT)
Dept: INTERNAL MEDICINE | Facility: CLINIC | Age: 76
End: 2021-01-31
Payer: MEDICARE

## 2021-01-31 DIAGNOSIS — Z23 NEED FOR VACCINATION: Primary | ICD-10-CM

## 2021-01-31 PROCEDURE — 0002A COVID-19, MRNA, LNP-S, PF, 30 MCG/0.3 ML DOSE VACCINE: CPT | Mod: PBBFAC | Performed by: FAMILY MEDICINE

## 2021-01-31 PROCEDURE — 91300 COVID-19, MRNA, LNP-S, PF, 30 MCG/0.3 ML DOSE VACCINE: CPT | Mod: PBBFAC | Performed by: FAMILY MEDICINE

## 2021-02-19 ENCOUNTER — CLINICAL SUPPORT (OUTPATIENT)
Dept: CARDIOLOGY | Facility: HOSPITAL | Age: 76
End: 2021-02-19
Payer: MEDICARE

## 2021-02-19 DIAGNOSIS — Z95.818 PRESENCE OF OTHER CARDIAC IMPLANTS AND GRAFTS: ICD-10-CM

## 2021-02-19 PROCEDURE — G2066 INTER DEVC REMOTE 30D: HCPCS | Performed by: INTERNAL MEDICINE

## 2021-02-19 PROCEDURE — 93298 CARDIAC DEVICE CHECK - REMOTE: ICD-10-PCS | Mod: ,,, | Performed by: INTERNAL MEDICINE

## 2021-02-19 PROCEDURE — 93298 REM INTERROG DEV EVAL SCRMS: CPT | Mod: ,,, | Performed by: INTERNAL MEDICINE

## 2021-02-22 DIAGNOSIS — E78.5 DYSLIPIDEMIA: ICD-10-CM

## 2021-02-22 DIAGNOSIS — Z12.5 ENCOUNTER FOR SCREENING FOR MALIGNANT NEOPLASM OF PROSTATE: ICD-10-CM

## 2021-02-22 DIAGNOSIS — Z00.00 WELL ADULT EXAM: Primary | ICD-10-CM

## 2021-02-22 DIAGNOSIS — I10 ESSENTIAL HYPERTENSION: ICD-10-CM

## 2021-02-22 DIAGNOSIS — R79.9 ABNORMAL FINDING OF BLOOD CHEMISTRY, UNSPECIFIED: ICD-10-CM

## 2021-03-03 ENCOUNTER — INITIAL CONSULT (OUTPATIENT)
Dept: VASCULAR SURGERY | Facility: CLINIC | Age: 76
End: 2021-03-03
Payer: MEDICARE

## 2021-03-03 ENCOUNTER — HOSPITAL ENCOUNTER (OUTPATIENT)
Dept: VASCULAR SURGERY | Facility: CLINIC | Age: 76
Discharge: HOME | End: 2021-03-03
Attending: SURGERY
Payer: MEDICARE

## 2021-03-03 VITALS
TEMPERATURE: 99 F | HEART RATE: 57 BPM | HEIGHT: 72 IN | SYSTOLIC BLOOD PRESSURE: 111 MMHG | BODY MASS INDEX: 27.17 KG/M2 | DIASTOLIC BLOOD PRESSURE: 63 MMHG | WEIGHT: 200.63 LBS

## 2021-03-03 DIAGNOSIS — R22.31 LUMP OF SKIN OF UPPER EXTREMITY, RIGHT: ICD-10-CM

## 2021-03-03 DIAGNOSIS — R22.31 LUMP OF SKIN OF UPPER EXTREMITY, RIGHT: Primary | ICD-10-CM

## 2021-03-03 PROCEDURE — 3078F DIAST BP <80 MM HG: CPT | Mod: S$GLB,,, | Performed by: SURGERY

## 2021-03-03 PROCEDURE — 99204 OFFICE O/P NEW MOD 45 MIN: CPT | Mod: S$GLB,,, | Performed by: SURGERY

## 2021-03-03 PROCEDURE — 1159F PR MEDICATION LIST DOCUMENTED IN MEDICAL RECORD: ICD-10-PCS | Mod: S$GLB,,, | Performed by: SURGERY

## 2021-03-03 PROCEDURE — 99999 PR PBB SHADOW E&M-EST. PATIENT-LVL III: CPT | Mod: PBBFAC,,, | Performed by: SURGERY

## 2021-03-03 PROCEDURE — 99204 PR OFFICE/OUTPT VISIT, NEW, LEVL IV, 45-59 MIN: ICD-10-PCS | Mod: S$GLB,,, | Performed by: SURGERY

## 2021-03-03 PROCEDURE — 1159F MED LIST DOCD IN RCRD: CPT | Mod: S$GLB,,, | Performed by: SURGERY

## 2021-03-03 PROCEDURE — 99999 PR PBB SHADOW E&M-EST. PATIENT-LVL III: ICD-10-PCS | Mod: PBBFAC,,, | Performed by: SURGERY

## 2021-03-03 PROCEDURE — 93971 PR US DUPLEX, UPPER OR LOWER EXT VENOUS,UNILAT OR LTD: ICD-10-PCS | Mod: S$GLB,,, | Performed by: SURGERY

## 2021-03-03 PROCEDURE — 3074F SYST BP LT 130 MM HG: CPT | Mod: S$GLB,,, | Performed by: SURGERY

## 2021-03-03 PROCEDURE — 93971 EXTREMITY STUDY: CPT | Mod: S$GLB,,, | Performed by: SURGERY

## 2021-03-03 PROCEDURE — 3078F PR MOST RECENT DIASTOLIC BLOOD PRESSURE < 80 MM HG: ICD-10-PCS | Mod: S$GLB,,, | Performed by: SURGERY

## 2021-03-03 PROCEDURE — 3074F PR MOST RECENT SYSTOLIC BLOOD PRESSURE < 130 MM HG: ICD-10-PCS | Mod: S$GLB,,, | Performed by: SURGERY

## 2021-03-21 ENCOUNTER — CLINICAL SUPPORT (OUTPATIENT)
Dept: CARDIOLOGY | Facility: HOSPITAL | Age: 76
End: 2021-03-21
Payer: MEDICARE

## 2021-03-21 DIAGNOSIS — Z95.818 PRESENCE OF OTHER CARDIAC IMPLANTS AND GRAFTS: ICD-10-CM

## 2021-03-21 PROCEDURE — 93298 CARDIAC DEVICE CHECK - REMOTE: ICD-10-PCS | Mod: ,,, | Performed by: INTERNAL MEDICINE

## 2021-03-21 PROCEDURE — G2066 INTER DEVC REMOTE 30D: HCPCS | Performed by: INTERNAL MEDICINE

## 2021-03-21 PROCEDURE — 93298 REM INTERROG DEV EVAL SCRMS: CPT | Mod: ,,, | Performed by: INTERNAL MEDICINE

## 2021-03-22 ENCOUNTER — LAB VISIT (OUTPATIENT)
Dept: LAB | Facility: HOSPITAL | Age: 76
End: 2021-03-22
Attending: INTERNAL MEDICINE
Payer: MEDICARE

## 2021-03-22 DIAGNOSIS — Z00.00 WELL ADULT EXAM: ICD-10-CM

## 2021-03-22 DIAGNOSIS — R79.9 ABNORMAL FINDING OF BLOOD CHEMISTRY, UNSPECIFIED: ICD-10-CM

## 2021-03-22 DIAGNOSIS — E78.5 DYSLIPIDEMIA: ICD-10-CM

## 2021-03-22 DIAGNOSIS — I10 ESSENTIAL HYPERTENSION: ICD-10-CM

## 2021-03-22 DIAGNOSIS — Z12.5 ENCOUNTER FOR SCREENING FOR MALIGNANT NEOPLASM OF PROSTATE: ICD-10-CM

## 2021-03-22 LAB
BASOPHILS # BLD AUTO: 0.02 K/UL (ref 0–0.2)
BASOPHILS NFR BLD: 0.3 % (ref 0–1.9)
DIFFERENTIAL METHOD: ABNORMAL
EOSINOPHIL # BLD AUTO: 0.1 K/UL (ref 0–0.5)
EOSINOPHIL NFR BLD: 1.1 % (ref 0–8)
ERYTHROCYTE [DISTWIDTH] IN BLOOD BY AUTOMATED COUNT: 13.6 % (ref 11.5–14.5)
HCT VFR BLD AUTO: 47 % (ref 40–54)
HGB BLD-MCNC: 15.9 G/DL (ref 14–18)
IMM GRANULOCYTES # BLD AUTO: 0.02 K/UL (ref 0–0.04)
IMM GRANULOCYTES NFR BLD AUTO: 0.3 % (ref 0–0.5)
LYMPHOCYTES # BLD AUTO: 2.9 K/UL (ref 1–4.8)
LYMPHOCYTES NFR BLD: 36.2 % (ref 18–48)
MCH RBC QN AUTO: 32 PG (ref 27–31)
MCHC RBC AUTO-ENTMCNC: 33.8 G/DL (ref 32–36)
MCV RBC AUTO: 95 FL (ref 82–98)
MONOCYTES # BLD AUTO: 0.5 K/UL (ref 0.3–1)
MONOCYTES NFR BLD: 6.5 % (ref 4–15)
NEUTROPHILS # BLD AUTO: 4.5 K/UL (ref 1.8–7.7)
NEUTROPHILS NFR BLD: 55.6 % (ref 38–73)
NRBC BLD-RTO: 0 /100 WBC
PLATELET # BLD AUTO: 201 K/UL (ref 150–350)
PMV BLD AUTO: 10.6 FL (ref 9.2–12.9)
RBC # BLD AUTO: 4.97 M/UL (ref 4.6–6.2)
WBC # BLD AUTO: 7.99 K/UL (ref 3.9–12.7)

## 2021-03-22 PROCEDURE — 84443 ASSAY THYROID STIM HORMONE: CPT | Performed by: INTERNAL MEDICINE

## 2021-03-22 PROCEDURE — 80053 COMPREHEN METABOLIC PANEL: CPT | Performed by: INTERNAL MEDICINE

## 2021-03-22 PROCEDURE — 85025 COMPLETE CBC W/AUTO DIFF WBC: CPT | Performed by: INTERNAL MEDICINE

## 2021-03-22 PROCEDURE — 80061 LIPID PANEL: CPT | Performed by: INTERNAL MEDICINE

## 2021-03-22 PROCEDURE — 84550 ASSAY OF BLOOD/URIC ACID: CPT | Performed by: INTERNAL MEDICINE

## 2021-03-22 PROCEDURE — 84153 ASSAY OF PSA TOTAL: CPT | Performed by: INTERNAL MEDICINE

## 2021-03-22 PROCEDURE — 83036 HEMOGLOBIN GLYCOSYLATED A1C: CPT | Performed by: INTERNAL MEDICINE

## 2021-03-22 PROCEDURE — 36415 COLL VENOUS BLD VENIPUNCTURE: CPT | Mod: PO | Performed by: INTERNAL MEDICINE

## 2021-03-23 LAB
ALBUMIN SERPL BCP-MCNC: 4 G/DL (ref 3.5–5.2)
ALP SERPL-CCNC: 61 U/L (ref 55–135)
ALT SERPL W/O P-5'-P-CCNC: 24 U/L (ref 10–44)
ANION GAP SERPL CALC-SCNC: 9 MMOL/L (ref 8–16)
AST SERPL-CCNC: 24 U/L (ref 10–40)
BILIRUB SERPL-MCNC: 0.9 MG/DL (ref 0.1–1)
BUN SERPL-MCNC: 19 MG/DL (ref 8–23)
CALCIUM SERPL-MCNC: 8.7 MG/DL (ref 8.7–10.5)
CHLORIDE SERPL-SCNC: 103 MMOL/L (ref 95–110)
CHOLEST SERPL-MCNC: 134 MG/DL (ref 120–199)
CHOLEST/HDLC SERPL: 4.1 {RATIO} (ref 2–5)
CO2 SERPL-SCNC: 26 MMOL/L (ref 23–29)
COMPLEXED PSA SERPL-MCNC: 1.9 NG/ML (ref 0–4)
CREAT SERPL-MCNC: 1 MG/DL (ref 0.5–1.4)
EST. GFR  (AFRICAN AMERICAN): >60 ML/MIN/1.73 M^2
EST. GFR  (NON AFRICAN AMERICAN): >60 ML/MIN/1.73 M^2
ESTIMATED AVG GLUCOSE: 100 MG/DL (ref 68–131)
GLUCOSE SERPL-MCNC: 93 MG/DL (ref 70–110)
HBA1C MFR BLD: 5.1 % (ref 4–5.6)
HDLC SERPL-MCNC: 33 MG/DL (ref 40–75)
HDLC SERPL: 24.6 % (ref 20–50)
LDLC SERPL CALC-MCNC: 75 MG/DL (ref 63–159)
NONHDLC SERPL-MCNC: 101 MG/DL
POTASSIUM SERPL-SCNC: 4 MMOL/L (ref 3.5–5.1)
PROT SERPL-MCNC: 7.2 G/DL (ref 6–8.4)
SODIUM SERPL-SCNC: 138 MMOL/L (ref 136–145)
TRIGL SERPL-MCNC: 130 MG/DL (ref 30–150)
TSH SERPL DL<=0.005 MIU/L-ACNC: 1.95 UIU/ML (ref 0.4–4)
URATE SERPL-MCNC: 7.1 MG/DL (ref 3.4–7)

## 2021-03-25 ENCOUNTER — OFFICE VISIT (OUTPATIENT)
Dept: INTERNAL MEDICINE | Facility: CLINIC | Age: 76
End: 2021-03-25
Payer: MEDICARE

## 2021-03-25 VITALS
HEIGHT: 72 IN | BODY MASS INDEX: 26.82 KG/M2 | OXYGEN SATURATION: 98 % | DIASTOLIC BLOOD PRESSURE: 72 MMHG | WEIGHT: 198 LBS | HEART RATE: 50 BPM | SYSTOLIC BLOOD PRESSURE: 125 MMHG | TEMPERATURE: 97 F

## 2021-03-25 DIAGNOSIS — K63.5 POLYP OF COLON, UNSPECIFIED PART OF COLON, UNSPECIFIED TYPE: ICD-10-CM

## 2021-03-25 DIAGNOSIS — Z12.11 COLON CANCER SCREENING: ICD-10-CM

## 2021-03-25 DIAGNOSIS — Z00.00 WELL ADULT EXAM: Primary | ICD-10-CM

## 2021-03-25 DIAGNOSIS — I25.10 CORONARY ARTERY CALCIFICATION: ICD-10-CM

## 2021-03-25 DIAGNOSIS — I10 ESSENTIAL HYPERTENSION: ICD-10-CM

## 2021-03-25 DIAGNOSIS — E78.5 DYSLIPIDEMIA: ICD-10-CM

## 2021-03-25 DIAGNOSIS — G47.33 OSA ON CPAP: ICD-10-CM

## 2021-03-25 DIAGNOSIS — I25.84 CORONARY ARTERY CALCIFICATION: ICD-10-CM

## 2021-03-25 PROCEDURE — 1101F PR PT FALLS ASSESS DOC 0-1 FALLS W/OUT INJ PAST YR: ICD-10-PCS | Mod: S$GLB,,, | Performed by: INTERNAL MEDICINE

## 2021-03-25 PROCEDURE — 99999 PR PBB SHADOW E&M-EST. PATIENT-LVL V: ICD-10-PCS | Mod: PBBFAC,,, | Performed by: INTERNAL MEDICINE

## 2021-03-25 PROCEDURE — 3008F PR BODY MASS INDEX (BMI) DOCUMENTED: ICD-10-PCS | Mod: S$GLB,,, | Performed by: INTERNAL MEDICINE

## 2021-03-25 PROCEDURE — 99999 PR PBB SHADOW E&M-EST. PATIENT-LVL V: CPT | Mod: PBBFAC,,, | Performed by: INTERNAL MEDICINE

## 2021-03-25 PROCEDURE — 99214 PR OFFICE/OUTPT VISIT, EST, LEVL IV, 30-39 MIN: ICD-10-PCS | Mod: S$GLB,,, | Performed by: INTERNAL MEDICINE

## 2021-03-25 PROCEDURE — 3288F PR FALLS RISK ASSESSMENT DOCUMENTED: ICD-10-PCS | Mod: S$GLB,,, | Performed by: INTERNAL MEDICINE

## 2021-03-25 PROCEDURE — 1126F PR PAIN SEVERITY QUANTIFIED, NO PAIN PRESENT: ICD-10-PCS | Mod: S$GLB,,, | Performed by: INTERNAL MEDICINE

## 2021-03-25 PROCEDURE — 99214 OFFICE O/P EST MOD 30 MIN: CPT | Mod: S$GLB,,, | Performed by: INTERNAL MEDICINE

## 2021-03-25 PROCEDURE — 1101F PT FALLS ASSESS-DOCD LE1/YR: CPT | Mod: S$GLB,,, | Performed by: INTERNAL MEDICINE

## 2021-03-25 PROCEDURE — 1126F AMNT PAIN NOTED NONE PRSNT: CPT | Mod: S$GLB,,, | Performed by: INTERNAL MEDICINE

## 2021-03-25 PROCEDURE — 3288F FALL RISK ASSESSMENT DOCD: CPT | Mod: S$GLB,,, | Performed by: INTERNAL MEDICINE

## 2021-03-25 PROCEDURE — 3008F BODY MASS INDEX DOCD: CPT | Mod: S$GLB,,, | Performed by: INTERNAL MEDICINE

## 2021-03-25 RX ORDER — HYDROCHLOROTHIAZIDE 12.5 MG/1
12.5 TABLET ORAL DAILY
Qty: 90 TABLET | Refills: 3 | Status: SHIPPED | OUTPATIENT
Start: 2021-03-25 | End: 2022-04-01 | Stop reason: SDUPTHER

## 2021-03-25 RX ORDER — ROSUVASTATIN CALCIUM 10 MG/1
10 TABLET, COATED ORAL DAILY
Qty: 90 TABLET | Refills: 3 | Status: SHIPPED | OUTPATIENT
Start: 2021-03-25 | End: 2022-04-01 | Stop reason: SDUPTHER

## 2021-03-25 RX ORDER — ZOSTER VACCINE RECOMBINANT, ADJUVANTED 50 MCG/0.5
0.5 KIT INTRAMUSCULAR ONCE
Qty: 1 EACH | Refills: 1 | Status: SHIPPED | OUTPATIENT
Start: 2021-03-25 | End: 2021-03-25

## 2021-04-20 ENCOUNTER — CLINICAL SUPPORT (OUTPATIENT)
Dept: CARDIOLOGY | Facility: HOSPITAL | Age: 76
End: 2021-04-20
Payer: MEDICARE

## 2021-04-20 DIAGNOSIS — Z95.818 PRESENCE OF OTHER CARDIAC IMPLANTS AND GRAFTS: ICD-10-CM

## 2021-04-20 PROCEDURE — 93298 REM INTERROG DEV EVAL SCRMS: CPT | Mod: ,,, | Performed by: INTERNAL MEDICINE

## 2021-04-20 PROCEDURE — 93298 CARDIAC DEVICE CHECK - REMOTE: ICD-10-PCS | Mod: ,,, | Performed by: INTERNAL MEDICINE

## 2021-04-20 PROCEDURE — G2066 INTER DEVC REMOTE 30D: HCPCS | Performed by: INTERNAL MEDICINE

## 2021-04-23 ENCOUNTER — PATIENT MESSAGE (OUTPATIENT)
Dept: INTERNAL MEDICINE | Facility: CLINIC | Age: 76
End: 2021-04-23

## 2021-04-23 RX ORDER — HYDROXYZINE PAMOATE 50 MG/1
50 CAPSULE ORAL NIGHTLY
Qty: 90 CAPSULE | Refills: 1 | Status: SHIPPED | OUTPATIENT
Start: 2021-04-23 | End: 2021-12-10

## 2021-04-28 ENCOUNTER — PATIENT MESSAGE (OUTPATIENT)
Dept: INTERNAL MEDICINE | Facility: CLINIC | Age: 76
End: 2021-04-28

## 2021-05-03 ENCOUNTER — PATIENT OUTREACH (OUTPATIENT)
Dept: ADMINISTRATIVE | Facility: HOSPITAL | Age: 76
End: 2021-05-03

## 2021-05-20 ENCOUNTER — CLINICAL SUPPORT (OUTPATIENT)
Dept: CARDIOLOGY | Facility: HOSPITAL | Age: 76
End: 2021-05-20
Payer: MEDICARE

## 2021-05-20 DIAGNOSIS — Z95.818 PRESENCE OF OTHER CARDIAC IMPLANTS AND GRAFTS: ICD-10-CM

## 2021-05-20 PROCEDURE — 93298 CARDIAC DEVICE CHECK - REMOTE: ICD-10-PCS | Mod: ,,, | Performed by: INTERNAL MEDICINE

## 2021-05-20 PROCEDURE — 93298 REM INTERROG DEV EVAL SCRMS: CPT | Mod: ,,, | Performed by: INTERNAL MEDICINE

## 2021-05-20 PROCEDURE — G2066 INTER DEVC REMOTE 30D: HCPCS | Performed by: INTERNAL MEDICINE

## 2021-06-19 ENCOUNTER — CLINICAL SUPPORT (OUTPATIENT)
Dept: CARDIOLOGY | Facility: HOSPITAL | Age: 76
End: 2021-06-19
Payer: MEDICARE

## 2021-06-19 DIAGNOSIS — Z95.818 PRESENCE OF OTHER CARDIAC IMPLANTS AND GRAFTS: ICD-10-CM

## 2021-06-19 PROCEDURE — 93298 CARDIAC DEVICE CHECK - REMOTE: ICD-10-PCS | Mod: ,,, | Performed by: INTERNAL MEDICINE

## 2021-06-19 PROCEDURE — G2066 INTER DEVC REMOTE 30D: HCPCS | Performed by: INTERNAL MEDICINE

## 2021-06-19 PROCEDURE — 93298 REM INTERROG DEV EVAL SCRMS: CPT | Mod: ,,, | Performed by: INTERNAL MEDICINE

## 2021-06-26 ENCOUNTER — PATIENT MESSAGE (OUTPATIENT)
Dept: INTERNAL MEDICINE | Facility: CLINIC | Age: 76
End: 2021-06-26

## 2021-06-28 ENCOUNTER — PATIENT MESSAGE (OUTPATIENT)
Dept: INTERNAL MEDICINE | Facility: CLINIC | Age: 76
End: 2021-06-28

## 2021-06-28 RX ORDER — LOSARTAN POTASSIUM 50 MG/1
50 TABLET ORAL DAILY
Qty: 90 TABLET | Refills: 3 | Status: SHIPPED | OUTPATIENT
Start: 2021-06-28 | End: 2022-03-29

## 2021-07-10 ENCOUNTER — PATIENT MESSAGE (OUTPATIENT)
Dept: SLEEP MEDICINE | Facility: CLINIC | Age: 76
End: 2021-07-10

## 2021-07-19 ENCOUNTER — CLINICAL SUPPORT (OUTPATIENT)
Dept: CARDIOLOGY | Facility: HOSPITAL | Age: 76
End: 2021-07-19
Payer: MEDICARE

## 2021-07-19 DIAGNOSIS — Z95.818 PRESENCE OF OTHER CARDIAC IMPLANTS AND GRAFTS: ICD-10-CM

## 2021-07-19 PROCEDURE — 93298 REM INTERROG DEV EVAL SCRMS: CPT | Mod: ,,, | Performed by: INTERNAL MEDICINE

## 2021-07-19 PROCEDURE — 93298 CARDIAC DEVICE CHECK - REMOTE: ICD-10-PCS | Mod: ,,, | Performed by: INTERNAL MEDICINE

## 2021-07-19 PROCEDURE — G2066 INTER DEVC REMOTE 30D: HCPCS | Performed by: INTERNAL MEDICINE

## 2021-08-16 ENCOUNTER — LAB VISIT (OUTPATIENT)
Dept: PRIMARY CARE CLINIC | Facility: OTHER | Age: 76
End: 2021-08-16
Attending: INTERNAL MEDICINE
Payer: MEDICARE

## 2021-08-16 DIAGNOSIS — Z20.822 ENCOUNTER FOR LABORATORY TESTING FOR COVID-19 VIRUS: ICD-10-CM

## 2021-08-16 PROCEDURE — U0003 INFECTIOUS AGENT DETECTION BY NUCLEIC ACID (DNA OR RNA); SEVERE ACUTE RESPIRATORY SYNDROME CORONAVIRUS 2 (SARS-COV-2) (CORONAVIRUS DISEASE [COVID-19]), AMPLIFIED PROBE TECHNIQUE, MAKING USE OF HIGH THROUGHPUT TECHNOLOGIES AS DESCRIBED BY CMS-2020-01-R: HCPCS | Performed by: INTERNAL MEDICINE

## 2021-08-17 LAB
SARS-COV-2 RNA RESP QL NAA+PROBE: NOT DETECTED
SARS-COV-2- CYCLE NUMBER: -1

## 2021-08-18 ENCOUNTER — CLINICAL SUPPORT (OUTPATIENT)
Dept: CARDIOLOGY | Facility: HOSPITAL | Age: 76
End: 2021-08-18
Attending: INTERNAL MEDICINE
Payer: MEDICARE

## 2021-08-18 DIAGNOSIS — Z95.818 PRESENCE OF OTHER CARDIAC IMPLANTS AND GRAFTS: ICD-10-CM

## 2021-08-18 PROCEDURE — G2066 INTER DEVC REMOTE 30D: HCPCS | Performed by: INTERNAL MEDICINE

## 2021-08-18 PROCEDURE — 93298 REM INTERROG DEV EVAL SCRMS: CPT | Mod: ,,, | Performed by: INTERNAL MEDICINE

## 2021-08-18 PROCEDURE — 93298 CARDIAC DEVICE CHECK - REMOTE: ICD-10-PCS | Mod: ,,, | Performed by: INTERNAL MEDICINE

## 2021-08-20 ENCOUNTER — PATIENT MESSAGE (OUTPATIENT)
Dept: ELECTROPHYSIOLOGY | Facility: CLINIC | Age: 76
End: 2021-08-20

## 2021-08-20 ENCOUNTER — OFFICE VISIT (OUTPATIENT)
Dept: INTERNAL MEDICINE | Facility: CLINIC | Age: 76
End: 2021-08-20
Payer: MEDICARE

## 2021-08-20 VITALS
WEIGHT: 197.75 LBS | TEMPERATURE: 98 F | HEIGHT: 72 IN | BODY MASS INDEX: 26.78 KG/M2 | OXYGEN SATURATION: 100 % | HEART RATE: 67 BPM | SYSTOLIC BLOOD PRESSURE: 120 MMHG | DIASTOLIC BLOOD PRESSURE: 80 MMHG

## 2021-08-20 DIAGNOSIS — M79.601 RIGHT ARM PAIN: Primary | ICD-10-CM

## 2021-08-20 PROCEDURE — 99999 PR PBB SHADOW E&M-EST. PATIENT-LVL IV: CPT | Mod: PBBFAC,,, | Performed by: INTERNAL MEDICINE

## 2021-08-20 PROCEDURE — 1159F PR MEDICATION LIST DOCUMENTED IN MEDICAL RECORD: ICD-10-PCS | Mod: S$GLB,,, | Performed by: INTERNAL MEDICINE

## 2021-08-20 PROCEDURE — 1125F AMNT PAIN NOTED PAIN PRSNT: CPT | Mod: S$GLB,,, | Performed by: INTERNAL MEDICINE

## 2021-08-20 PROCEDURE — 3288F PR FALLS RISK ASSESSMENT DOCUMENTED: ICD-10-PCS | Mod: S$GLB,,, | Performed by: INTERNAL MEDICINE

## 2021-08-20 PROCEDURE — 1160F PR REVIEW ALL MEDS BY PRESCRIBER/CLIN PHARMACIST DOCUMENTED: ICD-10-PCS | Mod: S$GLB,,, | Performed by: INTERNAL MEDICINE

## 2021-08-20 PROCEDURE — 1160F RVW MEDS BY RX/DR IN RCRD: CPT | Mod: S$GLB,,, | Performed by: INTERNAL MEDICINE

## 2021-08-20 PROCEDURE — 1101F PT FALLS ASSESS-DOCD LE1/YR: CPT | Mod: S$GLB,,, | Performed by: INTERNAL MEDICINE

## 2021-08-20 PROCEDURE — 3079F PR MOST RECENT DIASTOLIC BLOOD PRESSURE 80-89 MM HG: ICD-10-PCS | Mod: S$GLB,,, | Performed by: INTERNAL MEDICINE

## 2021-08-20 PROCEDURE — 1101F PR PT FALLS ASSESS DOC 0-1 FALLS W/OUT INJ PAST YR: ICD-10-PCS | Mod: S$GLB,,, | Performed by: INTERNAL MEDICINE

## 2021-08-20 PROCEDURE — 3008F BODY MASS INDEX DOCD: CPT | Mod: S$GLB,,, | Performed by: INTERNAL MEDICINE

## 2021-08-20 PROCEDURE — 1125F PR PAIN SEVERITY QUANTIFIED, PAIN PRESENT: ICD-10-PCS | Mod: S$GLB,,, | Performed by: INTERNAL MEDICINE

## 2021-08-20 PROCEDURE — 3074F SYST BP LT 130 MM HG: CPT | Mod: S$GLB,,, | Performed by: INTERNAL MEDICINE

## 2021-08-20 PROCEDURE — 99213 OFFICE O/P EST LOW 20 MIN: CPT | Mod: S$GLB,,, | Performed by: INTERNAL MEDICINE

## 2021-08-20 PROCEDURE — 3044F HG A1C LEVEL LT 7.0%: CPT | Mod: S$GLB,,, | Performed by: INTERNAL MEDICINE

## 2021-08-20 PROCEDURE — 99999 PR PBB SHADOW E&M-EST. PATIENT-LVL IV: ICD-10-PCS | Mod: PBBFAC,,, | Performed by: INTERNAL MEDICINE

## 2021-08-20 PROCEDURE — 3074F PR MOST RECENT SYSTOLIC BLOOD PRESSURE < 130 MM HG: ICD-10-PCS | Mod: S$GLB,,, | Performed by: INTERNAL MEDICINE

## 2021-08-20 PROCEDURE — 99213 PR OFFICE/OUTPT VISIT, EST, LEVL III, 20-29 MIN: ICD-10-PCS | Mod: S$GLB,,, | Performed by: INTERNAL MEDICINE

## 2021-08-20 PROCEDURE — 3044F PR MOST RECENT HEMOGLOBIN A1C LEVEL <7.0%: ICD-10-PCS | Mod: S$GLB,,, | Performed by: INTERNAL MEDICINE

## 2021-08-20 PROCEDURE — 3079F DIAST BP 80-89 MM HG: CPT | Mod: S$GLB,,, | Performed by: INTERNAL MEDICINE

## 2021-08-20 PROCEDURE — 3288F FALL RISK ASSESSMENT DOCD: CPT | Mod: S$GLB,,, | Performed by: INTERNAL MEDICINE

## 2021-08-20 PROCEDURE — 1159F MED LIST DOCD IN RCRD: CPT | Mod: S$GLB,,, | Performed by: INTERNAL MEDICINE

## 2021-08-20 PROCEDURE — 3008F PR BODY MASS INDEX (BMI) DOCUMENTED: ICD-10-PCS | Mod: S$GLB,,, | Performed by: INTERNAL MEDICINE

## 2021-08-20 RX ORDER — SODIUM, POTASSIUM,MAG SULFATES 17.5-3.13G
SOLUTION, RECONSTITUTED, ORAL ORAL
COMMUNITY
Start: 2021-04-22 | End: 2021-11-05

## 2021-08-20 RX ORDER — AMOXICILLIN AND CLAVULANATE POTASSIUM 875; 125 MG/1; MG/1
1 TABLET, FILM COATED ORAL 2 TIMES DAILY
COMMUNITY
Start: 2021-08-03 | End: 2021-11-05

## 2021-08-20 RX ORDER — GABAPENTIN 300 MG/1
300 CAPSULE ORAL 3 TIMES DAILY
Qty: 90 CAPSULE | Refills: 5 | Status: SHIPPED | OUTPATIENT
Start: 2021-08-20 | End: 2021-11-05

## 2021-08-20 RX ORDER — MUPIROCIN 20 MG/G
OINTMENT TOPICAL
COMMUNITY
Start: 2021-08-19 | End: 2021-11-05

## 2021-08-23 ENCOUNTER — PATIENT MESSAGE (OUTPATIENT)
Dept: SLEEP MEDICINE | Facility: CLINIC | Age: 76
End: 2021-08-23

## 2021-08-27 ENCOUNTER — HOSPITAL ENCOUNTER (OUTPATIENT)
Dept: RADIOLOGY | Facility: HOSPITAL | Age: 76
Discharge: HOME | End: 2021-08-27
Attending: INTERNAL MEDICINE
Payer: MEDICARE

## 2021-08-27 DIAGNOSIS — M79.601 RIGHT ARM PAIN: ICD-10-CM

## 2021-08-27 PROCEDURE — 73218 MRI HUMERUS WITHOUT CONTRAST RIGHT: ICD-10-PCS | Mod: 26,RT,, | Performed by: RADIOLOGY

## 2021-08-27 PROCEDURE — 73218 MRI UPPER EXTREMITY W/O DYE: CPT | Mod: TC,RT

## 2021-08-27 PROCEDURE — 73218 MRI UPPER EXTREMITY W/O DYE: CPT | Mod: 26,RT,, | Performed by: RADIOLOGY

## 2021-09-05 ENCOUNTER — PATIENT MESSAGE (OUTPATIENT)
Dept: SLEEP MEDICINE | Facility: CLINIC | Age: 76
End: 2021-09-05

## 2021-09-17 ENCOUNTER — CLINICAL SUPPORT (OUTPATIENT)
Dept: CARDIOLOGY | Facility: HOSPITAL | Age: 76
End: 2021-09-17
Payer: MEDICARE

## 2021-09-17 DIAGNOSIS — Z95.818 PRESENCE OF OTHER CARDIAC IMPLANTS AND GRAFTS: ICD-10-CM

## 2021-09-17 PROCEDURE — 93298 CARDIAC DEVICE CHECK - REMOTE: ICD-10-PCS | Mod: ,,, | Performed by: INTERNAL MEDICINE

## 2021-09-17 PROCEDURE — 93298 REM INTERROG DEV EVAL SCRMS: CPT | Mod: ,,, | Performed by: INTERNAL MEDICINE

## 2021-09-17 PROCEDURE — G2066 INTER DEVC REMOTE 30D: HCPCS | Performed by: INTERNAL MEDICINE

## 2021-10-04 ENCOUNTER — PATIENT MESSAGE (OUTPATIENT)
Dept: SLEEP MEDICINE | Facility: CLINIC | Age: 76
End: 2021-10-04

## 2021-10-05 ENCOUNTER — PATIENT MESSAGE (OUTPATIENT)
Dept: SLEEP MEDICINE | Facility: CLINIC | Age: 76
End: 2021-10-05

## 2021-10-17 ENCOUNTER — CLINICAL SUPPORT (OUTPATIENT)
Dept: CARDIOLOGY | Facility: HOSPITAL | Age: 76
End: 2021-10-17
Payer: MEDICARE

## 2021-10-17 DIAGNOSIS — Z95.818 PRESENCE OF OTHER CARDIAC IMPLANTS AND GRAFTS: ICD-10-CM

## 2021-10-17 PROCEDURE — G2066 INTER DEVC REMOTE 30D: HCPCS | Performed by: INTERNAL MEDICINE

## 2021-10-17 PROCEDURE — 93298 REM INTERROG DEV EVAL SCRMS: CPT | Mod: ,,, | Performed by: INTERNAL MEDICINE

## 2021-10-17 PROCEDURE — 93298 CARDIAC DEVICE CHECK - REMOTE: ICD-10-PCS | Mod: ,,, | Performed by: INTERNAL MEDICINE

## 2021-10-25 PROBLEM — I49.1 PREMATURE ATRIAL COMPLEXES: Status: RESOLVED | Noted: 2021-10-25 | Resolved: 2021-10-25

## 2021-10-25 PROBLEM — I50.30 DIASTOLIC HEART FAILURE: Status: RESOLVED | Noted: 2019-02-25 | Resolved: 2021-10-25

## 2021-10-25 PROBLEM — I49.1 PREMATURE ATRIAL COMPLEXES: Status: ACTIVE | Noted: 2021-10-25

## 2021-10-27 ENCOUNTER — OFFICE VISIT (OUTPATIENT)
Dept: ELECTROPHYSIOLOGY | Facility: CLINIC | Age: 76
End: 2021-10-27
Payer: MEDICARE

## 2021-10-27 VITALS
WEIGHT: 201.06 LBS | HEART RATE: 60 BPM | SYSTOLIC BLOOD PRESSURE: 118 MMHG | DIASTOLIC BLOOD PRESSURE: 70 MMHG | BODY MASS INDEX: 28.15 KG/M2 | HEIGHT: 71 IN

## 2021-10-27 DIAGNOSIS — I10 ESSENTIAL HYPERTENSION: ICD-10-CM

## 2021-10-27 DIAGNOSIS — G47.33 OSA ON CPAP: ICD-10-CM

## 2021-10-27 DIAGNOSIS — R00.2 PALPITATIONS: Primary | ICD-10-CM

## 2021-10-27 PROCEDURE — 3008F BODY MASS INDEX DOCD: CPT | Mod: S$GLB,,, | Performed by: INTERNAL MEDICINE

## 2021-10-27 PROCEDURE — 99999 PR PBB SHADOW E&M-EST. PATIENT-LVL III: CPT | Mod: PBBFAC,,, | Performed by: INTERNAL MEDICINE

## 2021-10-27 PROCEDURE — 99999 PR PBB SHADOW E&M-EST. PATIENT-LVL III: ICD-10-PCS | Mod: PBBFAC,,, | Performed by: INTERNAL MEDICINE

## 2021-10-27 PROCEDURE — 3074F SYST BP LT 130 MM HG: CPT | Mod: S$GLB,,, | Performed by: INTERNAL MEDICINE

## 2021-10-27 PROCEDURE — 1126F PR PAIN SEVERITY QUANTIFIED, NO PAIN PRESENT: ICD-10-PCS | Mod: S$GLB,,, | Performed by: INTERNAL MEDICINE

## 2021-10-27 PROCEDURE — 1126F AMNT PAIN NOTED NONE PRSNT: CPT | Mod: S$GLB,,, | Performed by: INTERNAL MEDICINE

## 2021-10-27 PROCEDURE — 4010F ACE/ARB THERAPY RXD/TAKEN: CPT | Mod: S$GLB,,, | Performed by: INTERNAL MEDICINE

## 2021-10-27 PROCEDURE — 3044F PR MOST RECENT HEMOGLOBIN A1C LEVEL <7.0%: ICD-10-PCS | Mod: S$GLB,,, | Performed by: INTERNAL MEDICINE

## 2021-10-27 PROCEDURE — 4010F PR ACE/ARB THEARPY RXD/TAKEN: ICD-10-PCS | Mod: S$GLB,,, | Performed by: INTERNAL MEDICINE

## 2021-10-27 PROCEDURE — 1101F PR PT FALLS ASSESS DOC 0-1 FALLS W/OUT INJ PAST YR: ICD-10-PCS | Mod: S$GLB,,, | Performed by: INTERNAL MEDICINE

## 2021-10-27 PROCEDURE — 3288F FALL RISK ASSESSMENT DOCD: CPT | Mod: S$GLB,,, | Performed by: INTERNAL MEDICINE

## 2021-10-27 PROCEDURE — 3044F HG A1C LEVEL LT 7.0%: CPT | Mod: S$GLB,,, | Performed by: INTERNAL MEDICINE

## 2021-10-27 PROCEDURE — 3074F PR MOST RECENT SYSTOLIC BLOOD PRESSURE < 130 MM HG: ICD-10-PCS | Mod: S$GLB,,, | Performed by: INTERNAL MEDICINE

## 2021-10-27 PROCEDURE — 99214 OFFICE O/P EST MOD 30 MIN: CPT | Mod: S$GLB,,, | Performed by: INTERNAL MEDICINE

## 2021-10-27 PROCEDURE — 1101F PT FALLS ASSESS-DOCD LE1/YR: CPT | Mod: S$GLB,,, | Performed by: INTERNAL MEDICINE

## 2021-10-27 PROCEDURE — 1159F MED LIST DOCD IN RCRD: CPT | Mod: S$GLB,,, | Performed by: INTERNAL MEDICINE

## 2021-10-27 PROCEDURE — 3078F PR MOST RECENT DIASTOLIC BLOOD PRESSURE < 80 MM HG: ICD-10-PCS | Mod: S$GLB,,, | Performed by: INTERNAL MEDICINE

## 2021-10-27 PROCEDURE — 3008F PR BODY MASS INDEX (BMI) DOCUMENTED: ICD-10-PCS | Mod: S$GLB,,, | Performed by: INTERNAL MEDICINE

## 2021-10-27 PROCEDURE — 99214 PR OFFICE/OUTPT VISIT, EST, LEVL IV, 30-39 MIN: ICD-10-PCS | Mod: S$GLB,,, | Performed by: INTERNAL MEDICINE

## 2021-10-27 PROCEDURE — 3078F DIAST BP <80 MM HG: CPT | Mod: S$GLB,,, | Performed by: INTERNAL MEDICINE

## 2021-10-27 PROCEDURE — 3288F PR FALLS RISK ASSESSMENT DOCUMENTED: ICD-10-PCS | Mod: S$GLB,,, | Performed by: INTERNAL MEDICINE

## 2021-10-27 PROCEDURE — 1159F PR MEDICATION LIST DOCUMENTED IN MEDICAL RECORD: ICD-10-PCS | Mod: S$GLB,,, | Performed by: INTERNAL MEDICINE

## 2021-11-04 ENCOUNTER — TELEPHONE (OUTPATIENT)
Dept: ADMINISTRATIVE | Facility: HOSPITAL | Age: 76
End: 2021-11-04
Payer: MEDICARE

## 2021-11-05 ENCOUNTER — TELEPHONE (OUTPATIENT)
Dept: INTERNAL MEDICINE | Facility: CLINIC | Age: 76
End: 2021-11-05
Payer: MEDICARE

## 2021-11-05 RX ORDER — NAPROXEN SODIUM 220 MG/1
81 TABLET, FILM COATED ORAL DAILY
Start: 2021-11-05

## 2021-11-16 ENCOUNTER — CLINICAL SUPPORT (OUTPATIENT)
Dept: CARDIOLOGY | Facility: HOSPITAL | Age: 76
End: 2021-11-16
Payer: MEDICARE

## 2021-11-16 DIAGNOSIS — Z95.818 PRESENCE OF OTHER CARDIAC IMPLANTS AND GRAFTS: ICD-10-CM

## 2021-11-16 PROCEDURE — 93298 REM INTERROG DEV EVAL SCRMS: CPT | Mod: ,,, | Performed by: INTERNAL MEDICINE

## 2021-11-16 PROCEDURE — 93298 CARDIAC DEVICE CHECK - REMOTE: ICD-10-PCS | Mod: ,,, | Performed by: INTERNAL MEDICINE

## 2021-11-16 PROCEDURE — G2066 INTER DEVC REMOTE 30D: HCPCS | Performed by: INTERNAL MEDICINE

## 2021-12-03 ENCOUNTER — PATIENT MESSAGE (OUTPATIENT)
Dept: ELECTROPHYSIOLOGY | Facility: CLINIC | Age: 76
End: 2021-12-03
Payer: MEDICARE

## 2021-12-10 ENCOUNTER — PATIENT MESSAGE (OUTPATIENT)
Dept: INTERNAL MEDICINE | Facility: CLINIC | Age: 76
End: 2021-12-10
Payer: MEDICARE

## 2021-12-16 ENCOUNTER — CLINICAL SUPPORT (OUTPATIENT)
Dept: CARDIOLOGY | Facility: HOSPITAL | Age: 76
End: 2021-12-16
Payer: MEDICARE

## 2021-12-16 DIAGNOSIS — Z95.818 PRESENCE OF OTHER CARDIAC IMPLANTS AND GRAFTS: ICD-10-CM

## 2021-12-16 PROCEDURE — G2066 INTER DEVC REMOTE 30D: HCPCS | Performed by: INTERNAL MEDICINE

## 2021-12-16 PROCEDURE — 93298 REM INTERROG DEV EVAL SCRMS: CPT | Mod: ,,, | Performed by: INTERNAL MEDICINE

## 2021-12-16 PROCEDURE — 93298 CARDIAC DEVICE CHECK - REMOTE: ICD-10-PCS | Mod: ,,, | Performed by: INTERNAL MEDICINE

## 2022-01-08 ENCOUNTER — TELEPHONE (OUTPATIENT)
Dept: INTERNAL MEDICINE | Facility: CLINIC | Age: 77
End: 2022-01-08
Payer: MEDICARE

## 2022-01-08 DIAGNOSIS — Z12.5 ENCOUNTER FOR SCREENING FOR MALIGNANT NEOPLASM OF PROSTATE: Primary | ICD-10-CM

## 2022-01-08 DIAGNOSIS — I10 ESSENTIAL HYPERTENSION: Primary | ICD-10-CM

## 2022-01-08 DIAGNOSIS — E78.5 DYSLIPIDEMIA: ICD-10-CM

## 2022-01-08 NOTE — TELEPHONE ENCOUNTER
Fasting lab and urine placed for pt to have done prior to his annual in April.  Unable to get the PSA to go through.

## 2022-01-11 NOTE — TELEPHONE ENCOUNTER
PSA order and diagnosis code has been entered. Please schedule as requested.    Routing comment      Order linked.

## 2022-01-15 ENCOUNTER — CLINICAL SUPPORT (OUTPATIENT)
Dept: CARDIOLOGY | Facility: HOSPITAL | Age: 77
End: 2022-01-15
Payer: MEDICARE

## 2022-01-15 DIAGNOSIS — Z95.818 PRESENCE OF OTHER CARDIAC IMPLANTS AND GRAFTS: ICD-10-CM

## 2022-01-15 PROCEDURE — G2066 INTER DEVC REMOTE 30D: HCPCS | Performed by: INTERNAL MEDICINE

## 2022-01-15 PROCEDURE — 93298 REM INTERROG DEV EVAL SCRMS: CPT | Mod: ,,, | Performed by: INTERNAL MEDICINE

## 2022-01-15 PROCEDURE — 93298 CARDIAC DEVICE CHECK - REMOTE: ICD-10-PCS | Mod: ,,, | Performed by: INTERNAL MEDICINE

## 2022-01-19 ENCOUNTER — TELEPHONE (OUTPATIENT)
Dept: INTERNAL MEDICINE | Facility: CLINIC | Age: 77
End: 2022-01-19
Payer: MEDICARE

## 2022-01-19 NOTE — TELEPHONE ENCOUNTER
Hi Dr Carr or Trang /Allison    I have Changed my [employer] Insurance To [employer] HMO effectkve 1/1/2022 andxGave [employer] Dr GEN Thakkar ad my Pimary care Dr on 1/3/2022 i am still wsitong to get tje New Card with Dr Carr .When I called me that Dr Carr Does not accept New pAtients    They Gave this Number to call [employer] so they will Give the Card    Human Telephone ; [phone]   My Info;[employer] Medicare (Employer HMO)   Maegan Cleary member ID:[id number]    Address [address] [address]   They told me you can call them    Thanks    DANIS Cleary MD

## 2022-01-20 NOTE — TELEPHONE ENCOUNTER
----- Message from Jennifer Rush sent at 1/20/2022  8:52 AM CST -----  Contact: [phone]  Patient called to let the nurse to please disregards previous message from yesterday.

## 2022-01-28 ENCOUNTER — PATIENT MESSAGE (OUTPATIENT)
Dept: INTERNAL MEDICINE | Facility: CLINIC | Age: 77
End: 2022-01-28
Payer: MEDICARE

## 2022-01-31 ENCOUNTER — PATIENT MESSAGE (OUTPATIENT)
Dept: INTERNAL MEDICINE | Facility: CLINIC | Age: 77
End: 2022-01-31
Payer: MEDICARE

## 2022-01-31 NOTE — TELEPHONE ENCOUNTER
I spoke to marko  In provider dept. they don't see anything that shows dr beaulieu is closed.    Medicare humana member services phone is

## 2022-01-31 NOTE — TELEPHONE ENCOUNTER
----- Message from Ernesto Ramirez sent at 1/31/2022  4:28 PM CST -----  Contact: [facility] @ [phone]  Melody PELAEZ/ [facility], would like  Green panel needs to be open so add Dr Carr as his PCP. Please call [phone]  Please call and advise.    Thank you and have a great day.

## 2022-02-03 NOTE — TELEPHONE ENCOUNTER
I called and asked for provider michael.  Spoke to April-  She does not show our panel is closed to new paients.  I gave her dr maria e trevino id number [id number].  She can't see patient's account-  She is calling to transfer and will let them know Our panel is open..   She transferred me to someone on the patient end and I was in middle of explaining situation to HIm and disconnected.  Of course, he didn't call me back.    I called again to provider michael , spoke to malgorzata since April not available. Told her the situation.  She looked dr green up-  She confirms his panel is open for patients      BUT they do need a letter of intent emailed  Stating taking new patients in what networks with belinda he is taking new patients.  Also need tax id [id number] , npi # [id number] our address [address]. Ángel [id number]  To   email address:  [email or web address]     Phone ref#  [id number]

## 2022-02-03 NOTE — TELEPHONE ENCOUNTER
----- Message from Naomi Santos sent at 2/3/2022  2:04 PM CST -----  Contact: [facility] Provider contractor  [phone]  Patient would like to get medical advice.    Comments:   Calling to get Dr. Reid membership panel unfreeze to assign this established patient. Please call number provided.

## 2022-02-04 NOTE — TELEPHONE ENCOUNTER
Josee called back.    Don't need the email .    Now she says she needs credentially dept to call them back and give ref #[id number].  Phone  option 2 (credentially dept).

## 2022-02-10 ENCOUNTER — PATIENT MESSAGE (OUTPATIENT)
Dept: INTERNAL MEDICINE | Facility: CLINIC | Age: 77
End: 2022-02-10
Payer: MEDICARE

## 2022-02-11 ENCOUNTER — TELEPHONE (OUTPATIENT)
Dept: INTERNAL MEDICINE | Facility: CLINIC | Age: 77
End: 2022-02-11
Payer: MEDICARE

## 2022-02-11 NOTE — TELEPHONE ENCOUNTER
I called and they forwarded me to .    I spoke to major- gave reference number.      crednayla is suppose to be working on giving ok to take him as patient.  We have been seeing him for over 3 years and would like to continue his care.    I told major I was trying to give someone verbal ok to add him as patient to dr beaulieu's panel.      They won't just add 2 patients on, they need panel      Ref# [id number]

## 2022-02-11 NOTE — TELEPHONE ENCOUNTER
----- Message from Radha Yung sent at 2/11/2022 12:45 PM CST -----  Contact: Juliorohit Cliff [phone]  Cliff called and stated that Dr Reid panel is frozen and it will not allow them to add Dr Carr as the Pt's PCP.  Ref # [id number]    Can you please assist with this.

## 2022-02-14 ENCOUNTER — CLINICAL SUPPORT (OUTPATIENT)
Dept: CARDIOLOGY | Facility: HOSPITAL | Age: 77
End: 2022-02-14
Attending: INTERNAL MEDICINE
Payer: MEDICARE

## 2022-02-14 DIAGNOSIS — Z95.818 PRESENCE OF OTHER CARDIAC IMPLANTS AND GRAFTS: ICD-10-CM

## 2022-02-14 PROCEDURE — G2066 INTER DEVC REMOTE 30D: HCPCS | Performed by: INTERNAL MEDICINE

## 2022-03-04 ENCOUNTER — PATIENT MESSAGE (OUTPATIENT)
Dept: INTERNAL MEDICINE | Facility: CLINIC | Age: 77
End: 2022-03-04
Payer: MEDICARE

## 2022-03-04 ENCOUNTER — TELEPHONE (OUTPATIENT)
Dept: INTERNAL MEDICINE | Facility: CLINIC | Age: 77
End: 2022-03-04
Payer: MEDICARE

## 2022-03-16 ENCOUNTER — CLINICAL SUPPORT (OUTPATIENT)
Dept: CARDIOLOGY | Facility: HOSPITAL | Age: 77
End: 2022-03-16
Payer: MEDICARE

## 2022-03-16 DIAGNOSIS — Z95.818 PRESENCE OF OTHER CARDIAC IMPLANTS AND GRAFTS: ICD-10-CM

## 2022-03-16 PROCEDURE — 93298 REM INTERROG DEV EVAL SCRMS: CPT | Mod: ,,, | Performed by: INTERNAL MEDICINE

## 2022-03-16 PROCEDURE — 93298 CARDIAC DEVICE CHECK - REMOTE: ICD-10-PCS | Mod: ,,, | Performed by: INTERNAL MEDICINE

## 2022-03-16 PROCEDURE — G2066 INTER DEVC REMOTE 30D: HCPCS | Performed by: INTERNAL MEDICINE

## 2022-03-25 ENCOUNTER — LAB VISIT (OUTPATIENT)
Dept: LAB | Facility: HOSPITAL | Age: 77
End: 2022-03-25
Attending: INTERNAL MEDICINE
Payer: MEDICARE

## 2022-03-25 DIAGNOSIS — E78.5 DYSLIPIDEMIA: ICD-10-CM

## 2022-03-25 DIAGNOSIS — Z12.5 ENCOUNTER FOR SCREENING FOR MALIGNANT NEOPLASM OF PROSTATE: ICD-10-CM

## 2022-03-25 DIAGNOSIS — I10 ESSENTIAL HYPERTENSION: ICD-10-CM

## 2022-03-25 LAB
ALBUMIN SERPL BCP-MCNC: 4 G/DL (ref 3.5–5.2)
ALP SERPL-CCNC: 68 U/L (ref 55–135)
ALT SERPL W/O P-5'-P-CCNC: 24 U/L (ref 10–44)
ANION GAP SERPL CALC-SCNC: 12 MMOL/L (ref 8–16)
AST SERPL-CCNC: 22 U/L (ref 10–40)
BASOPHILS # BLD AUTO: 0.02 K/UL (ref 0–0.2)
BASOPHILS NFR BLD: 0.3 % (ref 0–1.9)
BILIRUB SERPL-MCNC: 0.8 MG/DL (ref 0.1–1)
BUN SERPL-MCNC: 14 MG/DL (ref 8–23)
CALCIUM SERPL-MCNC: 9.6 MG/DL (ref 8.7–10.5)
CHLORIDE SERPL-SCNC: 103 MMOL/L (ref 95–110)
CHOLEST SERPL-MCNC: 121 MG/DL (ref 120–199)
CHOLEST/HDLC SERPL: 3.9 {RATIO} (ref 2–5)
CO2 SERPL-SCNC: 26 MMOL/L (ref 23–29)
COMPLEXED PSA SERPL-MCNC: 2.2 NG/ML (ref 0–4)
CREAT SERPL-MCNC: 0.9 MG/DL (ref 0.5–1.4)
DIFFERENTIAL METHOD: ABNORMAL
EOSINOPHIL # BLD AUTO: 0.1 K/UL (ref 0–0.5)
EOSINOPHIL NFR BLD: 1.3 % (ref 0–8)
ERYTHROCYTE [DISTWIDTH] IN BLOOD BY AUTOMATED COUNT: 13 % (ref 11.5–14.5)
EST. GFR  (AFRICAN AMERICAN): >60 ML/MIN/1.73 M^2
EST. GFR  (NON AFRICAN AMERICAN): >60 ML/MIN/1.73 M^2
ESTIMATED AVG GLUCOSE: 97 MG/DL (ref 68–131)
GLUCOSE SERPL-MCNC: 105 MG/DL (ref 70–110)
HBA1C MFR BLD: 5 % (ref 4–5.6)
HCT VFR BLD AUTO: 49 % (ref 40–54)
HDLC SERPL-MCNC: 31 MG/DL (ref 40–75)
HDLC SERPL: 25.6 % (ref 20–50)
HGB BLD-MCNC: 16.4 G/DL (ref 14–18)
IMM GRANULOCYTES # BLD AUTO: 0.02 K/UL (ref 0–0.04)
IMM GRANULOCYTES NFR BLD AUTO: 0.3 % (ref 0–0.5)
LDLC SERPL CALC-MCNC: 55.6 MG/DL (ref 63–159)
LYMPHOCYTES # BLD AUTO: 3.6 K/UL (ref 1–4.8)
LYMPHOCYTES NFR BLD: 46.1 % (ref 18–48)
MCH RBC QN AUTO: 32 PG (ref 27–31)
MCHC RBC AUTO-ENTMCNC: 33.5 G/DL (ref 32–36)
MCV RBC AUTO: 96 FL (ref 82–98)
MONOCYTES # BLD AUTO: 0.5 K/UL (ref 0.3–1)
MONOCYTES NFR BLD: 6.6 % (ref 4–15)
NEUTROPHILS # BLD AUTO: 3.5 K/UL (ref 1.8–7.7)
NEUTROPHILS NFR BLD: 45.4 % (ref 38–73)
NONHDLC SERPL-MCNC: 90 MG/DL
NRBC BLD-RTO: 0 /100 WBC
PLATELET # BLD AUTO: 212 K/UL (ref 150–450)
PMV BLD AUTO: 10.6 FL (ref 9.2–12.9)
POTASSIUM SERPL-SCNC: 4.5 MMOL/L (ref 3.5–5.1)
PROT SERPL-MCNC: 7.4 G/DL (ref 6–8.4)
RBC # BLD AUTO: 5.13 M/UL (ref 4.6–6.2)
SODIUM SERPL-SCNC: 141 MMOL/L (ref 136–145)
TRIGL SERPL-MCNC: 172 MG/DL (ref 30–150)
TSH SERPL DL<=0.005 MIU/L-ACNC: 2.29 UIU/ML (ref 0.4–4)
URATE SERPL-MCNC: 5.3 MG/DL (ref 3.4–7)
WBC # BLD AUTO: 7.73 K/UL (ref 3.9–12.7)

## 2022-03-25 PROCEDURE — 84550 ASSAY OF BLOOD/URIC ACID: CPT | Performed by: INTERNAL MEDICINE

## 2022-03-25 PROCEDURE — 84153 ASSAY OF PSA TOTAL: CPT | Performed by: INTERNAL MEDICINE

## 2022-03-25 PROCEDURE — 85025 COMPLETE CBC W/AUTO DIFF WBC: CPT | Performed by: INTERNAL MEDICINE

## 2022-03-25 PROCEDURE — 84443 ASSAY THYROID STIM HORMONE: CPT | Performed by: INTERNAL MEDICINE

## 2022-03-25 PROCEDURE — 80061 LIPID PANEL: CPT | Performed by: INTERNAL MEDICINE

## 2022-03-25 PROCEDURE — 83036 HEMOGLOBIN GLYCOSYLATED A1C: CPT | Performed by: INTERNAL MEDICINE

## 2022-03-25 PROCEDURE — 36415 COLL VENOUS BLD VENIPUNCTURE: CPT | Mod: PO | Performed by: INTERNAL MEDICINE

## 2022-03-25 PROCEDURE — 80053 COMPREHEN METABOLIC PANEL: CPT | Performed by: INTERNAL MEDICINE

## 2022-03-28 DIAGNOSIS — I25.84 CORONARY ARTERY CALCIFICATION: ICD-10-CM

## 2022-03-28 DIAGNOSIS — I25.10 CORONARY ARTERY CALCIFICATION: ICD-10-CM

## 2022-03-28 RX ORDER — HYDROCHLOROTHIAZIDE 12.5 MG/1
TABLET ORAL
Qty: 90 TABLET | Refills: 0 | OUTPATIENT
Start: 2022-03-28

## 2022-03-28 RX ORDER — ROSUVASTATIN CALCIUM 10 MG/1
TABLET, COATED ORAL
Qty: 90 TABLET | Refills: 0 | OUTPATIENT
Start: 2022-03-28

## 2022-03-28 NOTE — TELEPHONE ENCOUNTER
No new care gaps identified.  Powered by [employer] by [employer]. Reference number: [id number].   3/28/2022 11:15:03 AM CDT

## 2022-03-28 NOTE — TELEPHONE ENCOUNTER
No new care gaps identified.  Powered by [employer] by [employer]. Reference number: [id number].   3/28/2022 2:46:26 PM CDT

## 2022-03-28 NOTE — TELEPHONE ENCOUNTER
[facility] Note  Quick DC. Inappropriate Request   Refill request requires further review by MD: NO   Medication Therapy Plan: Pharmacy is requesting new script(s) for the following medications without required information, (sig/ frequency/qty/etc)     [facility]C action(s):  Quick Discontinue      Duplicate Pended Encounter(s)/ Last Prescribed Details:    Pharmacies have been requesting medications for patients without required information, (sig, frequency, qty, etc.). In addition, requests are sent for medication(s) pt. are currently not taking, and medications patients have never taken.    We have spoken to the pharmacies about these request types and advised their teams previously that we are unable to assess these New Script requests and require all details for these requests. This is a known issue and has been reported.        Medication related problems are not assessed for QDC.   Medication Reconciliation Completed? NO Were there pending details that required adjustment? NO     Automatic Epic Generated Protocol Data Below:   Requested Prescriptions   Pending Prescriptions Disp Refills    rosuvastatin (CRESTOR) 10 MG tablet [Pharmacy Med Name: ROSUVASTATIN 10MG TAB] 90 tablet 0              Appointments      Date Provider   Last Visit   8/20/2021 Leandro Carr MD   Next Visit   3/28/2022 Leandro Carr MD        Note composed:11:43 AM 03/28/2022

## 2022-03-28 NOTE — TELEPHONE ENCOUNTER
No new care gaps identified.  Powered by [employer] by [employer]. Reference number: [id number].   3/28/2022 11:13:51 AM CDT

## 2022-03-28 NOTE — TELEPHONE ENCOUNTER
No new care gaps identified.  Powered by [employer] by [employer]. Reference number: [id number].   3/28/2022 11:14:24 AM CDT

## 2022-03-28 NOTE — TELEPHONE ENCOUNTER
[facility] Note  Quick DC. Inappropriate Request   Refill request requires further review by MD: NO   Medication Therapy Plan: Pharmacy is requesting new script(s) for the following medications without required information, (sig/ frequency/qty/etc)     [facility]C action(s):  Quick Discontinue      Duplicate Pended Encounter(s)/ Last Prescribed Details:    Pharmacies have been requesting medications for patients without required information, (sig, frequency, qty, etc.). In addition, requests are sent for medication(s) pt. are currently not taking, and medications patients have never taken.    We have spoken to the pharmacies about these request types and advised their teams previously that we are unable to assess these New Script requests and require all details for these requests. This is a known issue and has been reported.        Medication related problems are not assessed for QDC.   Medication Reconciliation Completed? NO Were there pending details that required adjustment? NO     Automatic Epic Generated Protocol Data Below:   Requested Prescriptions   Pending Prescriptions Disp Refills    hydroCHLOROthiazide (HYDRODIURIL) 12.5 MG Tab [Pharmacy Med Name: HYDROCHLOROTHIAZIDE 12.5MG TAB] 90 tablet 0              Appointments      Date Provider   Last Visit   8/20/2021 Leandro Carr MD   Next Visit   4/1/2022 Leandro Carr MD        Note composed:11:43 AM 03/28/2022

## 2022-03-29 RX ORDER — GABAPENTIN 300 MG/1
300 CAPSULE ORAL 3 TIMES DAILY PRN
Qty: 270 CAPSULE | Refills: 1 | Status: SHIPPED | OUTPATIENT
Start: 2022-03-29 | End: 2024-03-28 | Stop reason: SDUPTHER

## 2022-03-29 RX ORDER — NEBIVOLOL 20 MG/1
TABLET ORAL
Qty: 90 TABLET | Refills: 0 | OUTPATIENT
Start: 2022-03-29

## 2022-03-29 NOTE — TELEPHONE ENCOUNTER
Quick DC. Inappropriate Request    Refill Authorization Note   Maegan Cleary  is requesting a refill authorization.  Brief Assessment and Rationale for Refill:  Quick Discontinue  Medication Therapy Plan:  Patient recieved year's worth of bystolic 9/13/21 and picked up 90 day supply 03/10/2022.    Medication Reconciliation Completed:  No      Comments:   Pended Medication(s)       Requested Prescriptions     Refused Prescriptions Disp Refills    nebivoloL (BYSTOLIC) 20 mg Tab 90 tablet 0     Sig: Once daily.     Refused By: YOUNG SCOTT     Reason for Refusal: Refill not appropriate        Duplicate Pended Encounter(s)/ Last Prescribed Details: (includes pharmacy & prescriber details)   Providers    Authorizing Provider:    Leandro Carr MD   [address] [place] [address]   Phone:  [phone]   Fax:  [phone]   ANTHONY #:  [id number]   NPI:  [id number]        Ordering User:  Leandro Carr MD          [address] [address]   Phone:  [phone]  Fax:  [phone]   ANTHONY #:  --   JOSE Reason: --       Outpatient Medication Detail     Disp Refills Start End JOSE   BYSTOLIC 20 mg Tab 90 tablet 3 9/13/2021  No   Sig: Take 1 tablet by mouth once daily   Sent to pharmacy as: BYSTOLIC 20 mg Tab   Class: Normal   Order: [id number]   Date/Time Signed: 9/13/2021 17:20       E-Prescribing Status: Receipt confirmed by pharmacy (9/13/2021  5:20 PM CDT)       Proportion of Days Covered for nebivolol HCl    From 9/28/2021 to 3/26/2022                 60    80     100%  of total days covered  (180/180 days)   Recent Dispenses         03/10/2022 20 mg Tab (disp 90, 90d supply)   12/14/2021 20 mg Tab (disp 90, 90d supply)   09/14/2021 20 mg Tab (disp 90, 90d supply)   06/21/2021 20 mg Tab (disp 90, 90d supply)                High Confidence    Fill data for this medication is likely complete. Other factors may still  affect the accuracy of the score.       Ordering Encounter Report    Associated Reports   View Encounter          Note composed:3:39 PM 03/29/2022

## 2022-04-01 ENCOUNTER — OFFICE VISIT (OUTPATIENT)
Dept: INTERNAL MEDICINE | Facility: CLINIC | Age: 77
End: 2022-04-01
Payer: MEDICARE

## 2022-04-01 VITALS
BODY MASS INDEX: 26.94 KG/M2 | OXYGEN SATURATION: 98 % | DIASTOLIC BLOOD PRESSURE: 80 MMHG | SYSTOLIC BLOOD PRESSURE: 124 MMHG | HEIGHT: 72 IN | RESPIRATION RATE: 13 BRPM | TEMPERATURE: 98 F | HEART RATE: 76 BPM | WEIGHT: 198.88 LBS

## 2022-04-01 DIAGNOSIS — I25.10 CORONARY ARTERY CALCIFICATION: ICD-10-CM

## 2022-04-01 DIAGNOSIS — E78.5 DYSLIPIDEMIA: ICD-10-CM

## 2022-04-01 DIAGNOSIS — Z00.00 WELL ADULT EXAM: Primary | ICD-10-CM

## 2022-04-01 DIAGNOSIS — I25.84 CORONARY ARTERY CALCIFICATION: ICD-10-CM

## 2022-04-01 DIAGNOSIS — G47.33 OSA ON CPAP: ICD-10-CM

## 2022-04-01 DIAGNOSIS — I10 ESSENTIAL HYPERTENSION: ICD-10-CM

## 2022-04-01 PROCEDURE — 3079F DIAST BP 80-89 MM HG: CPT | Mod: CPTII,S$GLB,, | Performed by: INTERNAL MEDICINE

## 2022-04-01 PROCEDURE — 3288F FALL RISK ASSESSMENT DOCD: CPT | Mod: CPTII,S$GLB,, | Performed by: INTERNAL MEDICINE

## 2022-04-01 PROCEDURE — 1160F PR REVIEW ALL MEDS BY PRESCRIBER/CLIN PHARMACIST DOCUMENTED: ICD-10-PCS | Mod: CPTII,S$GLB,, | Performed by: INTERNAL MEDICINE

## 2022-04-01 PROCEDURE — 3079F PR MOST RECENT DIASTOLIC BLOOD PRESSURE 80-89 MM HG: ICD-10-PCS | Mod: CPTII,S$GLB,, | Performed by: INTERNAL MEDICINE

## 2022-04-01 PROCEDURE — 1159F MED LIST DOCD IN RCRD: CPT | Mod: CPTII,S$GLB,, | Performed by: INTERNAL MEDICINE

## 2022-04-01 PROCEDURE — 1126F AMNT PAIN NOTED NONE PRSNT: CPT | Mod: CPTII,S$GLB,, | Performed by: INTERNAL MEDICINE

## 2022-04-01 PROCEDURE — 3008F PR BODY MASS INDEX (BMI) DOCUMENTED: ICD-10-PCS | Mod: CPTII,S$GLB,, | Performed by: INTERNAL MEDICINE

## 2022-04-01 PROCEDURE — 99999 PR PBB SHADOW E&M-EST. PATIENT-LVL IV: ICD-10-PCS | Mod: PBBFAC,,, | Performed by: INTERNAL MEDICINE

## 2022-04-01 PROCEDURE — 1101F PR PT FALLS ASSESS DOC 0-1 FALLS W/OUT INJ PAST YR: ICD-10-PCS | Mod: CPTII,S$GLB,, | Performed by: INTERNAL MEDICINE

## 2022-04-01 PROCEDURE — 3044F HG A1C LEVEL LT 7.0%: CPT | Mod: CPTII,S$GLB,, | Performed by: INTERNAL MEDICINE

## 2022-04-01 PROCEDURE — 4010F ACE/ARB THERAPY RXD/TAKEN: CPT | Mod: CPTII,S$GLB,, | Performed by: INTERNAL MEDICINE

## 2022-04-01 PROCEDURE — 3074F SYST BP LT 130 MM HG: CPT | Mod: CPTII,S$GLB,, | Performed by: INTERNAL MEDICINE

## 2022-04-01 PROCEDURE — 1159F PR MEDICATION LIST DOCUMENTED IN MEDICAL RECORD: ICD-10-PCS | Mod: CPTII,S$GLB,, | Performed by: INTERNAL MEDICINE

## 2022-04-01 PROCEDURE — 99397 PR PREVENTIVE VISIT,EST,65 & OVER: ICD-10-PCS | Mod: S$GLB,,, | Performed by: INTERNAL MEDICINE

## 2022-04-01 PROCEDURE — 1101F PT FALLS ASSESS-DOCD LE1/YR: CPT | Mod: CPTII,S$GLB,, | Performed by: INTERNAL MEDICINE

## 2022-04-01 PROCEDURE — 99999 PR PBB SHADOW E&M-EST. PATIENT-LVL IV: CPT | Mod: PBBFAC,,, | Performed by: INTERNAL MEDICINE

## 2022-04-01 PROCEDURE — 3044F PR MOST RECENT HEMOGLOBIN A1C LEVEL <7.0%: ICD-10-PCS | Mod: CPTII,S$GLB,, | Performed by: INTERNAL MEDICINE

## 2022-04-01 PROCEDURE — 1160F RVW MEDS BY RX/DR IN RCRD: CPT | Mod: CPTII,S$GLB,, | Performed by: INTERNAL MEDICINE

## 2022-04-01 PROCEDURE — 1126F PR PAIN SEVERITY QUANTIFIED, NO PAIN PRESENT: ICD-10-PCS | Mod: CPTII,S$GLB,, | Performed by: INTERNAL MEDICINE

## 2022-04-01 PROCEDURE — 99397 PER PM REEVAL EST PAT 65+ YR: CPT | Mod: S$GLB,,, | Performed by: INTERNAL MEDICINE

## 2022-04-01 PROCEDURE — 4010F PR ACE/ARB THEARPY RXD/TAKEN: ICD-10-PCS | Mod: CPTII,S$GLB,, | Performed by: INTERNAL MEDICINE

## 2022-04-01 PROCEDURE — 3288F PR FALLS RISK ASSESSMENT DOCUMENTED: ICD-10-PCS | Mod: CPTII,S$GLB,, | Performed by: INTERNAL MEDICINE

## 2022-04-01 PROCEDURE — 3008F BODY MASS INDEX DOCD: CPT | Mod: CPTII,S$GLB,, | Performed by: INTERNAL MEDICINE

## 2022-04-01 PROCEDURE — 3074F PR MOST RECENT SYSTOLIC BLOOD PRESSURE < 130 MM HG: ICD-10-PCS | Mod: CPTII,S$GLB,, | Performed by: INTERNAL MEDICINE

## 2022-04-01 RX ORDER — ROSUVASTATIN CALCIUM 10 MG/1
10 TABLET, COATED ORAL DAILY
Qty: 90 TABLET | Refills: 3 | Status: SHIPPED | OUTPATIENT
Start: 2022-04-01 | End: 2022-07-06 | Stop reason: SDUPTHER

## 2022-04-01 RX ORDER — HYDROCHLOROTHIAZIDE 12.5 MG/1
12.5 TABLET ORAL DAILY
Qty: 90 TABLET | Refills: 3 | Status: SHIPPED | OUTPATIENT
Start: 2022-04-01 | End: 2023-04-04 | Stop reason: SDUPTHER

## 2022-04-15 ENCOUNTER — CLINICAL SUPPORT (OUTPATIENT)
Dept: CARDIOLOGY | Facility: HOSPITAL | Age: 77
End: 2022-04-15
Payer: MEDICARE

## 2022-04-15 DIAGNOSIS — Z95.818 PRESENCE OF OTHER CARDIAC IMPLANTS AND GRAFTS: ICD-10-CM

## 2022-04-15 PROCEDURE — G2066 INTER DEVC REMOTE 30D: HCPCS | Performed by: INTERNAL MEDICINE

## 2022-05-02 NOTE — PROGRESS NOTES
Subjective:       Patient ID: Maegan Cleary is a 76 y.o. male.    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  He reports he is feeling well.  He remains retired at the present time.  He is resting well at night with use of his CPAP machine.  This has improved his quality of sleep at night and he is not experiencing any daytime sleepiness.  In addition to obstructive sleep apnea other active medical conditions include hypertension and hyperlipidemia.  He uses gabapentin twice daily for nerve pain.  He is tolerating his blood pressure and cholesterol medication well without side effects.  He uses supplemental saw palmetto for BPH symptoms.    Immunization record was reviewed.  The patient received Pneumovax in 2012.    Screening tests were reviewed.  The patient is up-to-date.  His last colonoscopy was on 04/28/2021 performed by his gastroenterologist Dr. Eyal Morris. He was advised to a follow-up examination in 2-3 years.      No interval change in past medical history, family history, or social history since prior evaluations.    Review of Systems   Constitutional: Negative for activity change, appetite change, chills, fatigue, fever and unexpected weight change.   HENT: Negative for nasal congestion, ear pain, nosebleeds and postnasal drip.    Eyes: Negative for pain, redness, itching and visual disturbance.   Respiratory: Negative for cough, chest tightness, shortness of breath and wheezing.    Cardiovascular: Negative for chest pain, palpitations and leg swelling.   Gastrointestinal: Negative for abdominal pain, blood in stool, constipation, nausea and vomiting.   Genitourinary: Negative for difficulty urinating, dysuria, frequency, hematuria and urgency.   Musculoskeletal: Positive for arthralgias and back pain. Negative for gait problem, joint swelling, myalgias, neck pain and neck stiffness.   Integumentary:  Negative for color change and rash.    Neurological: Negative for dizziness, seizures, syncope, weakness, light-headedness, numbness and headaches.   Hematological: Does not bruise/bleed easily.   Psychiatric/Behavioral: Positive for sleep disturbance. Negative for agitation, confusion and hallucinations. The patient is not nervous/anxious.             Physical Exam  Vitals and nursing note reviewed.   Constitutional:       General: He is not in acute distress.     Appearance: He is well-developed.      Comments:   The patient's weight has remained stable since 08/20/2021.   HENT:      Head: Normocephalic and atraumatic.      Right Ear: External ear normal.      Left Ear: External ear normal.      Mouth/Throat:      Pharynx: No oropharyngeal exudate.   Eyes:      General: No scleral icterus.     Conjunctiva/sclera: Conjunctivae normal.      Pupils: Pupils are equal, round, and reactive to light.   Neck:      Thyroid: No thyromegaly.      Vascular: No JVD.   Cardiovascular:      Rate and Rhythm: Normal rate and regular rhythm.      Heart sounds: Normal heart sounds. No murmur heard.    No friction rub. No gallop.   Pulmonary:      Effort: Pulmonary effort is normal. No respiratory distress.      Breath sounds: Normal breath sounds. No wheezing or rales.   Abdominal:      General: Bowel sounds are normal. There is no distension.      Palpations: Abdomen is soft. There is no mass.      Tenderness: There is no abdominal tenderness. There is no guarding.   Musculoskeletal:         General: No tenderness. Normal range of motion.      Cervical back: Normal range of motion and neck supple.      Comments: Back:  Negative straight leg raising test bilaterally; hip range of motion is intact.   Lymphadenopathy:      Cervical: No cervical adenopathy.   Skin:     General: Skin is warm and dry.      Findings: No rash.   Neurological:      Mental Status: He is alert and oriented to person, place, and time.      Cranial Nerves: No cranial nerve deficit.      Motor: No  abnormal muscle tone.      Deep Tendon Reflexes: Reflexes are normal and symmetric.   Psychiatric:         Behavior: Behavior normal.         Thought Content: Thought content normal.           Lab Visit on 03/25/2022   Component Date Value Ref Range Status    WBC 03/25/2022 7.73  3.90 - 12.70 K/uL Final    RBC 03/25/2022 5.13  4.60 - 6.20 M/uL Final    Hemoglobin 03/25/2022 16.4  14.0 - 18.0 g/dL Final    Hematocrit 03/25/2022 49.0  40.0 - 54.0 % Final    MCV 03/25/2022 96  82 - 98 fL Final    MCH 03/25/2022 32.0 (A) 27.0 - 31.0 pg Final    MCHC 03/25/2022 33.5  32.0 - 36.0 g/dL Final    RDW 03/25/2022 13.0  11.5 - 14.5 % Final    Platelets 03/25/2022 212  150 - 450 K/uL Final    MPV 03/25/2022 10.6  9.2 - 12.9 fL Final    Immature Granulocytes 03/25/2022 0.3  0.0 - 0.5 % Final    Gran # (ANC) 03/25/2022 3.5  1.8 - 7.7 K/uL Final    Immature Grans (Abs) 03/25/2022 0.02  0.00 - 0.04 K/uL Final    Comment: Mild elevation in immature granulocytes is non specific and   can be seen in a variety of conditions including stress response,   acute inflammation, trauma and pregnancy. Correlation with other   laboratory and clinical findings is essential.      Lymph # 03/25/2022 3.6  1.0 - 4.8 K/uL Final    Mono # 03/25/2022 0.5  0.3 - 1.0 K/uL Final    Eos # 03/25/2022 0.1  0.0 - 0.5 K/uL Final    Baso # 03/25/2022 0.02  0.00 - 0.20 K/uL Final    nRBC 03/25/2022 0  0 /100 WBC Final    Gran % 03/25/2022 45.4  38.0 - 73.0 % Final    Lymph % 03/25/2022 46.1  18.0 - 48.0 % Final    Mono % 03/25/2022 6.6  4.0 - 15.0 % Final    Eosinophil % 03/25/2022 1.3  0.0 - 8.0 % Final    Basophil % 03/25/2022 0.3  0.0 - 1.9 % Final    Differential Method 03/25/2022 Automated   Final    Sodium 03/25/2022 141  136 - 145 mmol/L Final    Potassium 03/25/2022 4.5  3.5 - 5.1 mmol/L Final    Chloride 03/25/2022 103  95 - 110 mmol/L Final    CO2 03/25/2022 26  23 - 29 mmol/L Final    Glucose 03/25/2022 105  70 - 110 mg/dL  Final    BUN 03/25/2022 14  8 - 23 mg/dL Final    Creatinine 03/25/2022 0.9  0.5 - 1.4 mg/dL Final    Calcium 03/25/2022 9.6  8.7 - 10.5 mg/dL Final    Total Protein 03/25/2022 7.4  6.0 - 8.4 g/dL Final    Albumin 03/25/2022 4.0  3.5 - 5.2 g/dL Final    Total Bilirubin 03/25/2022 0.8  0.1 - 1.0 mg/dL Final    Comment: For infants and newborns, interpretation of results should be based  on gestational age, weight and in agreement with clinical  observations.    Premature Infant recommended reference ranges:  Up to 24 hours.............<8.0 mg/dL  Up to 48 hours............<12.0 mg/dL  3-5 days..................<15.0 mg/dL  6-29 days.................<15.0 mg/dL      Alkaline Phosphatase 03/25/2022 68  55 - 135 U/L Final    AST 03/25/2022 22  10 - 40 U/L Final    ALT 03/25/2022 24  10 - 44 U/L Final    Anion Gap 03/25/2022 12  8 - 16 mmol/L Final    eGFR if African American 03/25/2022 >60.0  >60 mL/min/1.73 m^2 Final    eGFR if non African American 03/25/2022 >60.0  >60 mL/min/1.73 m^2 Final    Comment: Calculation used to obtain the estimated glomerular filtration  rate (eGFR) is the CKD-EPI equation.       Cholesterol 03/25/2022 121  120 - 199 mg/dL Final    Comment: The National Cholesterol Education Program (NCEP) has set the  following guidelines (reference ranges) for Cholesterol:  Optimal.....................<200 mg/dL  Borderline High.............200-239 mg/dL  High........................> or = 240 mg/dL      Triglycerides 03/25/2022 172 (A) 30 - 150 mg/dL Final    Comment: The National Cholesterol Education Program (NCEP) has set the  following guidelines (reference values) for triglycerides:  Normal......................<150 mg/dL  Borderline High.............150-199 mg/dL  High........................200-499 mg/dL      HDL 03/25/2022 31 (A) 40 - 75 mg/dL Final    Comment: The National Cholesterol Education Program (NCEP) has set the  following guidelines (reference values) for HDL  Cholesterol:  Low...............<40 mg/dL  Optimal...........>60 mg/dL      LDL Cholesterol 03/25/2022 55.6 (A) 63.0 - 159.0 mg/dL Final    Comment: The National Cholesterol Education Program (NCEP) has set the  following guidelines (reference values) for LDL Cholesterol:  Optimal.......................<130 mg/dL  Borderline High...............130-159 mg/dL  High..........................160-189 mg/dL  Very High.....................>190 mg/dL      HDL/Cholesterol Ratio 03/25/2022 25.6  20.0 - 50.0 % Final    Total Cholesterol/HDL Ratio 03/25/2022 3.9  2.0 - 5.0 Final    Non-HDL Cholesterol 03/25/2022 90  mg/dL Final    Comment: Risk category and Non-HDL cholesterol goals:  Coronary heart disease (CHD)or equivalent (10-year risk of CHD >20%):  Non-HDL cholesterol goal     <130 mg/dL  Two or more CHD risk factors and 10-year risk of CHD <= 20%:  Non-HDL cholesterol goal     <160 mg/dL  0 to 1 CHD risk factor:  Non-HDL cholesterol goal     <190 mg/dL      TSH 03/25/2022 2.291  0.400 - 4.000 uIU/mL Final    Hemoglobin A1C 03/25/2022 5.0  4.0 - 5.6 % Final    Comment: ADA Screening Guidelines:  5.7-6.4%  Consistent with prediabetes  >or=6.5%  Consistent with diabetes    High levels of fetal hemoglobin interfere with the HbA1C  assay. Heterozygous hemoglobin variants (HbS, HgC, etc)do  not significantly interfere with this assay.   However, presence of multiple variants may affect accuracy.      Estimated Avg Glucose 03/25/2022 97  68 - 131 mg/dL Final    Uric Acid 03/25/2022 5.3  3.4 - 7.0 mg/dL Final    PSA, Screen 03/25/2022 2.2  0.00 - 4.00 ng/mL Final    Comment: PSA Expected levels:  Hormonal Therapy: <0.05 ng/ml  Prostatectomy: <0.01 ng/ml  Radiation Therapy: <1.00 ng/ml     Lab Visit on 03/25/2022   Component Date Value Ref Range Status    Specimen UA 03/25/2022 Urine, Clean Catch   Final    Color, UA 03/25/2022 Yellow  Yellow, Straw, Svetlana Final    Appearance, UA 03/25/2022 Clear  Clear Final    pH,  UA 03/25/2022 5.0  5.0 - 8.0 Final    Specific [place], UA 03/25/2022 1.015  1.005 - 1.030 Final    Protein, UA 03/25/2022 Negative  Negative Final    Comment: Recommend a 24 hour urine protein or a urine   protein/creatinine ratio if globulin induced proteinuria is  clinically suspected.      Glucose, UA 03/25/2022 Negative  Negative Final    Ketones, UA 03/25/2022 Negative  Negative Final    Bilirubin (UA) 03/25/2022 Negative  Negative Final    Occult Blood UA 03/25/2022 Negative  Negative Final    Nitrite, UA 03/25/2022 Negative  Negative Final    Leukocytes, UA 03/25/2022 Negative  Negative Final       Assessment & Plan:      Maegan was seen today for annual exam.  Current therapy will be continued.    Diagnoses and all orders for this visit:    Well adult exam    Coronary artery calcification  -     rosuvastatin (CRESTOR) 10 MG tablet; Take 1 tablet (10 mg total) by mouth once daily.    Essential hypertension    Dyslipidemia    CRIS on CPAP    Other orders  -     hydroCHLOROthiazide (HYDRODIURIL) 12.5 MG Tab; Take 1 tablet (12.5 mg total) by mouth once daily.         Follow up in about 1 year (around 4/1/2023).     Leandro Carr MD

## 2022-05-15 ENCOUNTER — CLINICAL SUPPORT (OUTPATIENT)
Dept: CARDIOLOGY | Facility: HOSPITAL | Age: 77
End: 2022-05-15
Payer: MEDICARE

## 2022-05-15 DIAGNOSIS — Z95.818 PRESENCE OF OTHER CARDIAC IMPLANTS AND GRAFTS: ICD-10-CM

## 2022-05-15 PROCEDURE — 93298 CARDIAC DEVICE CHECK - REMOTE: ICD-10-PCS | Mod: ,,, | Performed by: INTERNAL MEDICINE

## 2022-05-15 PROCEDURE — G2066 INTER DEVC REMOTE 30D: HCPCS | Performed by: INTERNAL MEDICINE

## 2022-05-15 PROCEDURE — 93298 REM INTERROG DEV EVAL SCRMS: CPT | Mod: ,,, | Performed by: INTERNAL MEDICINE

## 2022-06-14 ENCOUNTER — CLINICAL SUPPORT (OUTPATIENT)
Dept: CARDIOLOGY | Facility: HOSPITAL | Age: 77
End: 2022-06-14
Payer: MEDICARE

## 2022-06-14 DIAGNOSIS — Z95.818 PRESENCE OF OTHER CARDIAC IMPLANTS AND GRAFTS: ICD-10-CM

## 2022-06-14 PROCEDURE — G2066 INTER DEVC REMOTE 30D: HCPCS | Performed by: INTERNAL MEDICINE

## 2022-07-06 DIAGNOSIS — I25.84 CORONARY ARTERY CALCIFICATION: ICD-10-CM

## 2022-07-06 DIAGNOSIS — I25.10 CORONARY ARTERY CALCIFICATION: ICD-10-CM

## 2022-07-06 RX ORDER — ROSUVASTATIN CALCIUM 10 MG/1
10 TABLET, COATED ORAL DAILY
Qty: 90 TABLET | Refills: 2 | Status: SHIPPED | OUTPATIENT
Start: 2022-07-06 | End: 2023-04-04 | Stop reason: SDUPTHER

## 2022-07-06 NOTE — TELEPHONE ENCOUNTER
----- Message from Nayeli Tse sent at 7/6/2022  4:25 PM CDT -----  Contact: [facility].  Requesting an RX refill or new RX.  Is this a refill or new RX: Refill 1  RX name and strength (copy/paste from chart):  rosuvastatin (CRESTOR) 10 MG tablet  Is this a 30 day or 90 day RX:   Pharmacy name and phone # (copy/paste from chart): [facility] ([facility]) - [place], [place] - [facility] [address]   Phone:  [phone]  Fax:  [phone]         The doctors have asked that we provide their patients with the following 2 reminders -- prescription refills can take up to 72 hours, and a friendly reminder that in the future you can use your [facility] account to request refills:

## 2022-07-06 NOTE — TELEPHONE ENCOUNTER
No new care gaps identified.  [facility] Embedded Care Gaps. Reference number: [id number]. 7/06/2022   4:31:10 PM CDT

## 2022-07-07 NOTE — TELEPHONE ENCOUNTER
Refill Decision Note   Maegan Cleary  is requesting a refill authorization.  Brief Assessment and Rationale for Refill:  Approve     Medication Therapy Plan:       Medication Reconciliation Completed: No   Comments:     No Care Gaps recommended.     Note composed:8:50 PM 07/06/2022

## 2022-07-08 ENCOUNTER — PATIENT MESSAGE (OUTPATIENT)
Dept: ELECTROPHYSIOLOGY | Facility: CLINIC | Age: 77
End: 2022-07-08
Payer: MEDICARE

## 2022-07-14 ENCOUNTER — CLINICAL SUPPORT (OUTPATIENT)
Dept: CARDIOLOGY | Facility: HOSPITAL | Age: 77
End: 2022-07-14
Payer: MEDICARE

## 2022-07-14 DIAGNOSIS — Z95.818 PRESENCE OF OTHER CARDIAC IMPLANTS AND GRAFTS: ICD-10-CM

## 2022-07-14 PROCEDURE — 93298 CARDIAC DEVICE CHECK - REMOTE: ICD-10-PCS | Mod: ,,, | Performed by: INTERNAL MEDICINE

## 2022-07-14 PROCEDURE — G2066 INTER DEVC REMOTE 30D: HCPCS | Performed by: INTERNAL MEDICINE

## 2022-07-14 PROCEDURE — 93298 REM INTERROG DEV EVAL SCRMS: CPT | Mod: ,,, | Performed by: INTERNAL MEDICINE

## 2022-07-22 ENCOUNTER — PES CALL (OUTPATIENT)
Dept: ADMINISTRATIVE | Facility: OTHER | Age: 77
End: 2022-07-22
Payer: MEDICARE

## 2022-08-13 ENCOUNTER — CLINICAL SUPPORT (OUTPATIENT)
Dept: CARDIOLOGY | Facility: HOSPITAL | Age: 77
End: 2022-08-13
Payer: MEDICARE

## 2022-08-13 DIAGNOSIS — Z95.818 PRESENCE OF OTHER CARDIAC IMPLANTS AND GRAFTS: ICD-10-CM

## 2022-08-13 PROCEDURE — G2066 INTER DEVC REMOTE 30D: HCPCS | Performed by: INTERNAL MEDICINE

## 2022-08-30 ENCOUNTER — TELEPHONE (OUTPATIENT)
Dept: ELECTROPHYSIOLOGY | Facility: CLINIC | Age: 77
End: 2022-08-30
Payer: MEDICARE

## 2022-08-30 NOTE — TELEPHONE ENCOUNTER
----- Message from Adria Urban RN sent at 8/29/2022 12:39 PM CDT -----  Please request a [employer] ILR manual download for missing egms.      Following MD:  Marshal

## 2022-09-02 ENCOUNTER — TELEPHONE (OUTPATIENT)
Dept: CARDIOLOGY | Facility: HOSPITAL | Age: 77
End: 2022-09-02
Payer: MEDICARE

## 2022-09-02 NOTE — TELEPHONE ENCOUNTER
Called patient to have her send a manual download from her remote ILR monitor. Patient stated she will send this transmission now.  ----- Message from Nayeli Payne RN sent at 9/2/2022  1:19 PM CDT -----  Please request a [employer] ILR manual download for missing egms.      Following MD: James Araya           1

## 2022-09-06 ENCOUNTER — PATIENT MESSAGE (OUTPATIENT)
Dept: CARDIOLOGY | Facility: HOSPITAL | Age: 77
End: 2022-09-06
Payer: MEDICARE

## 2022-09-06 NOTE — TELEPHONE ENCOUNTER
Patient contacted office regarding home transmission sent on LINQ device. He stated that his monitor has not connected with his device since 9/4/2022 and this is not the first time this has happened. He has contacted [employer] about this issue but no one has returned his call. I explained to pt that once remote is reviewed by nurse, if there is any issues, he will be contacted. He verbalized understanding.

## 2022-09-07 ENCOUNTER — PATIENT MESSAGE (OUTPATIENT)
Dept: ELECTROPHYSIOLOGY | Facility: CLINIC | Age: 77
End: 2022-09-07
Payer: MEDICARE

## 2022-09-07 DIAGNOSIS — I49.8 OTHER SPECIFIED CARDIAC ARRHYTHMIAS: Primary | ICD-10-CM

## 2022-09-12 ENCOUNTER — CLINICAL SUPPORT (OUTPATIENT)
Dept: CARDIOLOGY | Facility: HOSPITAL | Age: 77
End: 2022-09-12
Payer: MEDICARE

## 2022-09-12 DIAGNOSIS — Z95.818 PRESENCE OF OTHER CARDIAC IMPLANTS AND GRAFTS: ICD-10-CM

## 2022-09-12 PROCEDURE — 93298 REM INTERROG DEV EVAL SCRMS: CPT | Mod: ,,, | Performed by: INTERNAL MEDICINE

## 2022-09-12 PROCEDURE — 93298 CARDIAC DEVICE CHECK - REMOTE: ICD-10-PCS | Mod: ,,, | Performed by: INTERNAL MEDICINE

## 2022-09-12 PROCEDURE — G2066 INTER DEVC REMOTE 30D: HCPCS | Performed by: INTERNAL MEDICINE

## 2022-10-12 ENCOUNTER — CLINICAL SUPPORT (OUTPATIENT)
Dept: CARDIOLOGY | Facility: HOSPITAL | Age: 77
End: 2022-10-12
Payer: MEDICARE

## 2022-10-12 DIAGNOSIS — Z95.818 PRESENCE OF OTHER CARDIAC IMPLANTS AND GRAFTS: ICD-10-CM

## 2022-10-12 PROCEDURE — G2066 INTER DEVC REMOTE 30D: HCPCS | Performed by: INTERNAL MEDICINE

## 2022-10-25 NOTE — PROGRESS NOTES
Subjective:    Patient ID:  Maegan Cleary is a 77 y.o. 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  ILR implanted 7/17/10.  Bystolic initiated.  Feeling well overall since initiation of Bystolic. Walking more. Less palpitations.  ILR reveals no significant arrhythmias.     Update:    Doing well. Exercising daily. Feeling well overall. No palpitations.  Has decreased Bystolic down to 10 mg daily.  ILR reveals no significant arrhythmias    Review of Systems   Constitutional: Negative. Negative for fever and malaise/fatigue.   HENT:  Negative for congestion and sore throat.    Cardiovascular:  Negative for chest pain, dyspnea on exertion, irregular heartbeat, leg swelling, near-syncope, orthopnea, palpitations, paroxysmal nocturnal dyspnea and syncope.   Respiratory:  Negative for cough and shortness of breath.    Gastrointestinal:  Negative for abdominal pain, constipation and diarrhea.   Neurological:  Negative for dizziness, light-headedness and weakness.   Psychiatric/Behavioral:  Negative for depression. The patient is not nervous/anxious.    All other systems reviewed and are negative.     Objective:    Physical Exam  Constitutional:       Appearance: He is well-developed.   Eyes:      General: No scleral icterus.     Conjunctiva/sclera: Conjunctivae normal.   Neck:      Vascular: No JVD.      Trachea: No tracheal deviation.   Cardiovascular:      Rate and Rhythm: Normal rate and regular rhythm.      Chest Wall: PMI is not displaced.      Heart sounds: Normal heart sounds.   Pulmonary:      Effort: Pulmonary effort is normal. No respiratory distress.      Breath sounds: Normal breath sounds.   Abdominal:      Palpations: Abdomen is soft.      Tenderness: There is no abdominal tenderness.   Musculoskeletal:         General: No tenderness.   Skin:     General: Skin is warm and dry.       Findings: No rash.   Neurological:      Mental Status: He is alert and oriented to person, place, and time.   Psychiatric:         Behavior: Behavior normal.         Assessment:       1. Palpitations    2. Premature beats    3. Essential hypertension    4. CRIS on CPAP         Plan:       Doing great.  Continue Bystolic at 10 mg daily.  F/u in 1 year.

## 2022-10-26 ENCOUNTER — OFFICE VISIT (OUTPATIENT)
Dept: ELECTROPHYSIOLOGY | Facility: CLINIC | Age: 77
End: 2022-10-26
Payer: MEDICARE

## 2022-10-26 ENCOUNTER — HOSPITAL ENCOUNTER (OUTPATIENT)
Dept: CARDIOLOGY | Facility: CLINIC | Age: 77
Discharge: HOME | End: 2022-10-26
Payer: MEDICARE

## 2022-10-26 VITALS
DIASTOLIC BLOOD PRESSURE: 68 MMHG | HEIGHT: 72 IN | WEIGHT: 200.63 LBS | SYSTOLIC BLOOD PRESSURE: 119 MMHG | BODY MASS INDEX: 27.17 KG/M2 | HEART RATE: 65 BPM

## 2022-10-26 DIAGNOSIS — I49.49 PREMATURE BEATS: ICD-10-CM

## 2022-10-26 DIAGNOSIS — I10 ESSENTIAL HYPERTENSION: ICD-10-CM

## 2022-10-26 DIAGNOSIS — R00.2 PALPITATIONS: Primary | ICD-10-CM

## 2022-10-26 DIAGNOSIS — I49.8 OTHER SPECIFIED CARDIAC ARRHYTHMIAS: ICD-10-CM

## 2022-10-26 DIAGNOSIS — G47.33 OSA ON CPAP: ICD-10-CM

## 2022-10-26 PROCEDURE — 4010F ACE/ARB THERAPY RXD/TAKEN: CPT | Mod: CPTII,S$GLB,, | Performed by: INTERNAL MEDICINE

## 2022-10-26 PROCEDURE — 99215 OFFICE O/P EST HI 40 MIN: CPT | Mod: S$GLB,,, | Performed by: INTERNAL MEDICINE

## 2022-10-26 PROCEDURE — 93010 ELECTROCARDIOGRAM REPORT: CPT | Mod: S$GLB,,, | Performed by: INTERNAL MEDICINE

## 2022-10-26 PROCEDURE — 3044F HG A1C LEVEL LT 7.0%: CPT | Mod: CPTII,S$GLB,, | Performed by: INTERNAL MEDICINE

## 2022-10-26 PROCEDURE — 1159F PR MEDICATION LIST DOCUMENTED IN MEDICAL RECORD: ICD-10-PCS | Mod: CPTII,S$GLB,, | Performed by: INTERNAL MEDICINE

## 2022-10-26 PROCEDURE — 99999 PR PBB SHADOW E&M-EST. PATIENT-LVL III: ICD-10-PCS | Mod: PBBFAC,,, | Performed by: INTERNAL MEDICINE

## 2022-10-26 PROCEDURE — 99499 UNLISTED E&M SERVICE: CPT | Mod: HCNC,S$GLB,, | Performed by: INTERNAL MEDICINE

## 2022-10-26 PROCEDURE — 93005 ELECTROCARDIOGRAM TRACING: CPT | Mod: S$GLB,,, | Performed by: INTERNAL MEDICINE

## 2022-10-26 PROCEDURE — 3074F PR MOST RECENT SYSTOLIC BLOOD PRESSURE < 130 MM HG: ICD-10-PCS | Mod: CPTII,S$GLB,, | Performed by: INTERNAL MEDICINE

## 2022-10-26 PROCEDURE — 3074F SYST BP LT 130 MM HG: CPT | Mod: CPTII,S$GLB,, | Performed by: INTERNAL MEDICINE

## 2022-10-26 PROCEDURE — 3288F PR FALLS RISK ASSESSMENT DOCUMENTED: ICD-10-PCS | Mod: CPTII,S$GLB,, | Performed by: INTERNAL MEDICINE

## 2022-10-26 PROCEDURE — 93005 RHYTHM STRIP: ICD-10-PCS | Mod: S$GLB,,, | Performed by: INTERNAL MEDICINE

## 2022-10-26 PROCEDURE — 4010F PR ACE/ARB THEARPY RXD/TAKEN: ICD-10-PCS | Mod: CPTII,S$GLB,, | Performed by: INTERNAL MEDICINE

## 2022-10-26 PROCEDURE — 1159F MED LIST DOCD IN RCRD: CPT | Mod: CPTII,S$GLB,, | Performed by: INTERNAL MEDICINE

## 2022-10-26 PROCEDURE — 3078F PR MOST RECENT DIASTOLIC BLOOD PRESSURE < 80 MM HG: ICD-10-PCS | Mod: CPTII,S$GLB,, | Performed by: INTERNAL MEDICINE

## 2022-10-26 PROCEDURE — 3044F PR MOST RECENT HEMOGLOBIN A1C LEVEL <7.0%: ICD-10-PCS | Mod: CPTII,S$GLB,, | Performed by: INTERNAL MEDICINE

## 2022-10-26 PROCEDURE — 3078F DIAST BP <80 MM HG: CPT | Mod: CPTII,S$GLB,, | Performed by: INTERNAL MEDICINE

## 2022-10-26 PROCEDURE — 99999 PR PBB SHADOW E&M-EST. PATIENT-LVL III: CPT | Mod: PBBFAC,,, | Performed by: INTERNAL MEDICINE

## 2022-10-26 PROCEDURE — 3288F FALL RISK ASSESSMENT DOCD: CPT | Mod: CPTII,S$GLB,, | Performed by: INTERNAL MEDICINE

## 2022-10-26 PROCEDURE — 99499 RISK ADDL DX/OHS AUDIT: ICD-10-PCS | Mod: HCNC,S$GLB,, | Performed by: INTERNAL MEDICINE

## 2022-10-26 PROCEDURE — 1101F PR PT FALLS ASSESS DOC 0-1 FALLS W/OUT INJ PAST YR: ICD-10-PCS | Mod: CPTII,S$GLB,, | Performed by: INTERNAL MEDICINE

## 2022-10-26 PROCEDURE — 1126F AMNT PAIN NOTED NONE PRSNT: CPT | Mod: CPTII,S$GLB,, | Performed by: INTERNAL MEDICINE

## 2022-10-26 PROCEDURE — 1101F PT FALLS ASSESS-DOCD LE1/YR: CPT | Mod: CPTII,S$GLB,, | Performed by: INTERNAL MEDICINE

## 2022-10-26 PROCEDURE — 99215 PR OFFICE/OUTPT VISIT, EST, LEVL V, 40-54 MIN: ICD-10-PCS | Mod: S$GLB,,, | Performed by: INTERNAL MEDICINE

## 2022-10-26 PROCEDURE — 1126F PR PAIN SEVERITY QUANTIFIED, NO PAIN PRESENT: ICD-10-PCS | Mod: CPTII,S$GLB,, | Performed by: INTERNAL MEDICINE

## 2022-10-26 PROCEDURE — 93010 RHYTHM STRIP: ICD-10-PCS | Mod: S$GLB,,, | Performed by: INTERNAL MEDICINE

## 2022-10-26 RX ORDER — NEBIVOLOL 10 MG/1
10 TABLET ORAL DAILY
Qty: 90 TABLET | Refills: 3 | Status: SHIPPED | OUTPATIENT
Start: 2022-10-26 | End: 2023-09-18

## 2022-11-04 ENCOUNTER — PATIENT MESSAGE (OUTPATIENT)
Dept: INTERNAL MEDICINE | Facility: CLINIC | Age: 77
End: 2022-11-04
Payer: MEDICARE

## 2022-11-09 DIAGNOSIS — Z00.00 WELL ADULT EXAM: Primary | ICD-10-CM

## 2022-11-09 DIAGNOSIS — R79.9 ABNORMAL FINDING OF BLOOD CHEMISTRY, UNSPECIFIED: ICD-10-CM

## 2022-11-09 DIAGNOSIS — E78.5 DYSLIPIDEMIA: ICD-10-CM

## 2022-11-09 DIAGNOSIS — Z12.5 ENCOUNTER FOR SCREENING FOR MALIGNANT NEOPLASM OF PROSTATE: ICD-10-CM

## 2022-11-09 DIAGNOSIS — I10 ESSENTIAL HYPERTENSION: ICD-10-CM

## 2022-11-11 ENCOUNTER — CLINICAL SUPPORT (OUTPATIENT)
Dept: CARDIOLOGY | Facility: HOSPITAL | Age: 77
End: 2022-11-11
Payer: MEDICARE

## 2022-11-11 DIAGNOSIS — Z95.818 PRESENCE OF OTHER CARDIAC IMPLANTS AND GRAFTS: ICD-10-CM

## 2022-11-11 PROCEDURE — G2066 INTER DEVC REMOTE 30D: HCPCS | Performed by: INTERNAL MEDICINE

## 2022-11-11 PROCEDURE — 93298 CARDIAC DEVICE CHECK - REMOTE: ICD-10-PCS | Mod: ,,, | Performed by: INTERNAL MEDICINE

## 2022-11-11 PROCEDURE — 93298 REM INTERROG DEV EVAL SCRMS: CPT | Mod: ,,, | Performed by: INTERNAL MEDICINE

## 2022-11-14 ENCOUNTER — PES CALL (OUTPATIENT)
Dept: ADMINISTRATIVE | Facility: CLINIC | Age: 77
End: 2022-11-14
Payer: MEDICARE

## 2022-12-11 ENCOUNTER — CLINICAL SUPPORT (OUTPATIENT)
Dept: CARDIOLOGY | Facility: HOSPITAL | Age: 77
End: 2022-12-11
Payer: MEDICARE

## 2022-12-11 DIAGNOSIS — Z95.818 PRESENCE OF OTHER CARDIAC IMPLANTS AND GRAFTS: ICD-10-CM

## 2022-12-11 PROCEDURE — G2066 INTER DEVC REMOTE 30D: HCPCS | Performed by: INTERNAL MEDICINE

## 2023-01-10 ENCOUNTER — CLINICAL SUPPORT (OUTPATIENT)
Dept: CARDIOLOGY | Facility: HOSPITAL | Age: 78
End: 2023-01-10
Payer: MEDICARE

## 2023-01-10 DIAGNOSIS — Z95.818 PRESENCE OF OTHER CARDIAC IMPLANTS AND GRAFTS: ICD-10-CM

## 2023-01-10 PROCEDURE — G2066 INTER DEVC REMOTE 30D: HCPCS | Mod: HCNC | Performed by: INTERNAL MEDICINE

## 2023-01-10 PROCEDURE — 93298 REM INTERROG DEV EVAL SCRMS: CPT | Mod: HCNC,,, | Performed by: INTERNAL MEDICINE

## 2023-01-10 PROCEDURE — 93298 CARDIAC DEVICE CHECK - REMOTE: ICD-10-PCS | Mod: HCNC,,, | Performed by: INTERNAL MEDICINE

## 2023-02-07 DIAGNOSIS — Z00.00 ENCOUNTER FOR MEDICARE ANNUAL WELLNESS EXAM: ICD-10-CM

## 2023-02-08 ENCOUNTER — PATIENT MESSAGE (OUTPATIENT)
Dept: INTERNAL MEDICINE | Facility: CLINIC | Age: 78
End: 2023-02-08
Payer: MEDICARE

## 2023-02-09 ENCOUNTER — CLINICAL SUPPORT (OUTPATIENT)
Dept: CARDIOLOGY | Facility: HOSPITAL | Age: 78
End: 2023-02-09
Payer: MEDICARE

## 2023-02-09 DIAGNOSIS — Z95.818 PRESENCE OF OTHER CARDIAC IMPLANTS AND GRAFTS: ICD-10-CM

## 2023-02-09 DIAGNOSIS — Z00.00 ENCOUNTER FOR MEDICARE ANNUAL WELLNESS EXAM: ICD-10-CM

## 2023-02-09 PROCEDURE — G2066 INTER DEVC REMOTE 30D: HCPCS | Mod: HCNC | Performed by: INTERNAL MEDICINE

## 2023-03-01 ENCOUNTER — PES CALL (OUTPATIENT)
Dept: ADMINISTRATIVE | Facility: CLINIC | Age: 78
End: 2023-03-01
Payer: MEDICARE

## 2023-03-01 ENCOUNTER — TELEPHONE (OUTPATIENT)
Dept: INTERNAL MEDICINE | Facility: CLINIC | Age: 78
End: 2023-03-01
Payer: MEDICARE

## 2023-03-11 ENCOUNTER — CLINICAL SUPPORT (OUTPATIENT)
Dept: CARDIOLOGY | Facility: HOSPITAL | Age: 78
End: 2023-03-11
Payer: MEDICARE

## 2023-03-11 DIAGNOSIS — Z95.818 PRESENCE OF OTHER CARDIAC IMPLANTS AND GRAFTS: ICD-10-CM

## 2023-03-11 PROCEDURE — 93298 CARDIAC DEVICE CHECK - REMOTE: ICD-10-PCS | Mod: HCNC,,, | Performed by: INTERNAL MEDICINE

## 2023-03-11 PROCEDURE — G2066 INTER DEVC REMOTE 30D: HCPCS | Mod: HCNC | Performed by: INTERNAL MEDICINE

## 2023-03-11 PROCEDURE — 93298 REM INTERROG DEV EVAL SCRMS: CPT | Mod: HCNC,,, | Performed by: INTERNAL MEDICINE

## 2023-03-24 ENCOUNTER — OFFICE VISIT (OUTPATIENT)
Dept: FAMILY MEDICINE | Facility: CLINIC | Age: 78
End: 2023-03-24
Payer: MEDICARE

## 2023-03-24 VITALS
HEART RATE: 68 BPM | OXYGEN SATURATION: 95 % | SYSTOLIC BLOOD PRESSURE: 118 MMHG | HEIGHT: 72 IN | BODY MASS INDEX: 27.95 KG/M2 | WEIGHT: 206.38 LBS | DIASTOLIC BLOOD PRESSURE: 60 MMHG

## 2023-03-24 DIAGNOSIS — Z00.00 ENCOUNTER FOR PREVENTIVE HEALTH EXAMINATION: ICD-10-CM

## 2023-03-24 DIAGNOSIS — I10 ESSENTIAL HYPERTENSION: ICD-10-CM

## 2023-03-24 DIAGNOSIS — Z00.00 ENCOUNTER FOR MEDICARE ANNUAL WELLNESS EXAM: Primary | ICD-10-CM

## 2023-03-24 DIAGNOSIS — R00.2 PALPITATIONS: ICD-10-CM

## 2023-03-24 DIAGNOSIS — G47.33 OSA ON CPAP: ICD-10-CM

## 2023-03-24 DIAGNOSIS — E78.5 HYPERLIPIDEMIA, UNSPECIFIED HYPERLIPIDEMIA TYPE: ICD-10-CM

## 2023-03-24 PROCEDURE — 1159F MED LIST DOCD IN RCRD: CPT | Mod: HCNC,CPTII,S$GLB,

## 2023-03-24 PROCEDURE — 3288F FALL RISK ASSESSMENT DOCD: CPT | Mod: HCNC,CPTII,S$GLB,

## 2023-03-24 PROCEDURE — G0439 PR MEDICARE ANNUAL WELLNESS SUBSEQUENT VISIT: ICD-10-PCS | Mod: HCNC,S$GLB,,

## 2023-03-24 PROCEDURE — 99999 PR PBB SHADOW E&M-EST. PATIENT-LVL V: ICD-10-PCS | Mod: PBBFAC,HCNC,,

## 2023-03-24 PROCEDURE — 3074F PR MOST RECENT SYSTOLIC BLOOD PRESSURE < 130 MM HG: ICD-10-PCS | Mod: HCNC,CPTII,S$GLB,

## 2023-03-24 PROCEDURE — 1160F PR REVIEW ALL MEDS BY PRESCRIBER/CLIN PHARMACIST DOCUMENTED: ICD-10-PCS | Mod: HCNC,CPTII,S$GLB,

## 2023-03-24 PROCEDURE — 1126F AMNT PAIN NOTED NONE PRSNT: CPT | Mod: HCNC,CPTII,S$GLB,

## 2023-03-24 PROCEDURE — 3078F DIAST BP <80 MM HG: CPT | Mod: HCNC,CPTII,S$GLB,

## 2023-03-24 PROCEDURE — 3074F SYST BP LT 130 MM HG: CPT | Mod: HCNC,CPTII,S$GLB,

## 2023-03-24 PROCEDURE — 1159F PR MEDICATION LIST DOCUMENTED IN MEDICAL RECORD: ICD-10-PCS | Mod: HCNC,CPTII,S$GLB,

## 2023-03-24 PROCEDURE — 1170F FXNL STATUS ASSESSED: CPT | Mod: HCNC,CPTII,S$GLB,

## 2023-03-24 PROCEDURE — 1101F PR PT FALLS ASSESS DOC 0-1 FALLS W/OUT INJ PAST YR: ICD-10-PCS | Mod: HCNC,CPTII,S$GLB,

## 2023-03-24 PROCEDURE — 1170F PR FUNCTIONAL STATUS ASSESSED: ICD-10-PCS | Mod: HCNC,CPTII,S$GLB,

## 2023-03-24 PROCEDURE — 1101F PT FALLS ASSESS-DOCD LE1/YR: CPT | Mod: HCNC,CPTII,S$GLB,

## 2023-03-24 PROCEDURE — 1126F PR PAIN SEVERITY QUANTIFIED, NO PAIN PRESENT: ICD-10-PCS | Mod: HCNC,CPTII,S$GLB,

## 2023-03-24 PROCEDURE — 99999 PR PBB SHADOW E&M-EST. PATIENT-LVL V: CPT | Mod: PBBFAC,HCNC,,

## 2023-03-24 PROCEDURE — G0439 PPPS, SUBSEQ VISIT: HCPCS | Mod: HCNC,S$GLB,,

## 2023-03-24 PROCEDURE — 3288F PR FALLS RISK ASSESSMENT DOCUMENTED: ICD-10-PCS | Mod: HCNC,CPTII,S$GLB,

## 2023-03-24 PROCEDURE — 3078F PR MOST RECENT DIASTOLIC BLOOD PRESSURE < 80 MM HG: ICD-10-PCS | Mod: HCNC,CPTII,S$GLB,

## 2023-03-24 PROCEDURE — 1160F RVW MEDS BY RX/DR IN RCRD: CPT | Mod: HCNC,CPTII,S$GLB,

## 2023-03-24 RX ORDER — MULTIVIT WITH IRON,MINERALS
100 TABLET ORAL NIGHTLY
COMMUNITY
End: 2023-04-04

## 2023-03-24 NOTE — PROGRESS NOTES
Maegan Cleary presented for a  Medicare AWV and comprehensive Health Risk Assessment today. The following components were reviewed and updated:    Medical history  Family History  Social history  Allergies and Current Medications  Health Risk Assessment  Health Maintenance  Care Team         ** See Completed Assessments for Annual Wellness Visit within the encounter summary.**         The following assessments were completed:  Living Situation  CAGE  Depression Screening  Timed Get Up and Go  Whisper Test  Cognitive Function Screening        Nutrition Screening  ADL Screening  PAQ Screening      Review for Opioid Screening: Pt does not have Rx for Opioids      Review for Substance Use Disorders: Patient does not use substance        Current Outpatient Medications:     aspirin 81 MG Chew, Take 1 tablet (81 mg total) by mouth once daily., Disp: , Rfl:     cholecalciferol, vitamin D3, (VITAMIN D3) 50 mcg (2,000 unit) Tab, Take 1 tablet by mouth once daily. , Disp: , Rfl:     cyanocobalamin 2000 MCG tablet, Take 5,000 mcg by mouth once daily. , Disp: , Rfl:     losartan (COZAAR) 50 MG tablet, TAKE 1 TABLET EVERY DAY, Disp: 90 tablet, Rfl: 0    nebivoloL (BYSTOLIC) 10 MG Tab, Take 1 tablet (10 mg total) by mouth once daily., Disp: 90 tablet, Rfl: 3    niacin 100 MG Tab, Take 100 mg by mouth every evening., Disp: , Rfl:     rosuvastatin (CRESTOR) 10 MG tablet, Take 1 tablet (10 mg total) by mouth once daily., Disp: 90 tablet, Rfl: 2    SAW PALMETTO ORAL, Take 1 tablet by mouth once daily., Disp: , Rfl:     TUMERIC-GING-OLIVE-OREG-CAPRYL ORAL, Take by mouth., Disp: , Rfl:     gabapentin (NEURONTIN) 300 MG capsule, Take 1 capsule (300 mg total) by mouth 3 (three) times daily as needed. (Patient taking differently: Take 300 mg by mouth 3 (three) times daily as needed. Once a day), Disp: 270 capsule, Rfl: 1    hydroCHLOROthiazide (HYDRODIURIL) 12.5 MG Tab, Take 1 tablet (12.5 mg total) by mouth once daily. (Patient not  taking: Reported on 3/24/2023), Disp: 90 tablet, Rfl: 3    hydrOXYzine pamoate (VISTARIL) 50 MG Cap, Take 1 capsule (50 mg total) by mouth every evening. (Patient not taking: Reported on 3/24/2023), Disp: 90 capsule, Rfl: 1  No current facility-administered medications for this visit.    Facility-Administered Medications Ordered in Other Visits:     0.9%  NaCl infusion, , Intravenous, Once, Allison Roa NP       Vitals:    03/24/23 1101   BP: 118/60   Pulse: 68   SpO2: 95%   Weight: 93.6 kg (206 lb 5.6 oz)   Height: 6' (1.829 m)   PainSc: 0-No pain      Physical Exam  Vitals reviewed.   Constitutional:       General: He is not in acute distress.     Appearance: Normal appearance. He is not ill-appearing or toxic-appearing.   Eyes:      Pupils: Pupils are equal, round, and reactive to light.   Cardiovascular:      Rate and Rhythm: Normal rate and regular rhythm.      Pulses: Normal pulses.      Heart sounds: Normal heart sounds. No murmur heard.  Pulmonary:      Effort: Pulmonary effort is normal. No respiratory distress.      Breath sounds: Normal breath sounds.   Abdominal:      General: Bowel sounds are normal. There is no distension.      Palpations: Abdomen is soft.   Musculoskeletal:      Cervical back: Normal range of motion.      Right lower leg: No edema.      Left lower leg: No edema.   Skin:     General: Skin is warm and dry.   Neurological:      General: No focal deficit present.      Mental Status: He is alert and oriented to person, place, and time.   Psychiatric:         Mood and Affect: Mood normal.         Speech: Speech normal.             Diagnoses and health risks identified today and associated recommendations/orders:    1. Encounter for Medicare annual wellness exam  - Ambulatory Referral/Consult to Enhanced Annual Wellness Visit (eAWV)    2. Encounter for preventive health examination    3. Essential hypertension  Chronic; stable on medications. Followed by PCP.     4. Hyperlipidemia,  unspecified hyperlipidemia type  Chronic; stable on medications. Followed by PCP.     5. Palpitations  Chronic; stable on Bystolic, ILR in place. Followed by Cardiology    6. CRIS on CPAP  Chronic; stable on CPAP. Followed by PCP.       Provided Maegan with a 5-10 year written screening schedule and personal prevention plan. Recommendations were developed using the USPSTF age appropriate recommendations. Education, counseling, and referrals were provided as needed. After Visit Summary printed and given to patient which includes a list of additional screenings\tests needed.    Follow up in about 1 year (around 3/24/2024).    Thelma Keith NP    Advance Care Planning   I offered to discuss advanced care planning, including how to pick a person who would make decisions for you if you were unable to make them for yourself, called a health care power of , and what kind of decisions you might make such as use of life sustaining treatments such as ventilators and tube feeding when faced with a life limiting illness recorded on a living will that they will need to know. (How you want to be cared for as you near the end of your natural life)     X Patient is interested in learning more about how to make advanced directives.  I provided them paperwork and offered to discuss this with them.

## 2023-03-24 NOTE — PATIENT INSTRUCTIONS
Counseling and Referral of Other Preventative  (Italic type indicates deductible and co-insurance are waived)    Patient Name: Maegan Cleary  Today's Date: 3/24/2023    Health Maintenance       Date Due Completion Date    COVID-19 Vaccine (5 - Booster for Pfizer series) 12/29/2022 11/3/2022    Aspirin/Antiplatelet Therapy 10/26/2023 10/26/2022    Colorectal Cancer Screening 04/28/2024 4/28/2021    Lipid Panel 03/25/2027 3/25/2022    TETANUS VACCINE 10/11/2032 10/11/2022    Override on 1/1/2016: Done        No orders of the defined types were placed in this encounter.      The following information is provided to all patients.  This information is to help you find resources for any of the problems found today that may be affecting your health:                Living healthy guide: www.[facility].[place].gov      Understanding Diabetes: www.diabetes.org      Eating healthy: www.cdc.gov/healthyweight      CDC home safety checklist: www.cdc.gov/steadi/patient.html      Agency on Aging: www.goea.[place].[facility]      Alcoholics anonymous (AA): www.aa.org      Physical Activity: www.harvey.nih.gov/xm8bcox      Tobacco use: www.quitwithusla.org

## 2023-03-28 ENCOUNTER — LAB VISIT (OUTPATIENT)
Dept: LAB | Facility: HOSPITAL | Age: 78
End: 2023-03-28
Attending: INTERNAL MEDICINE
Payer: MEDICARE

## 2023-03-28 DIAGNOSIS — I10 ESSENTIAL HYPERTENSION: ICD-10-CM

## 2023-03-28 DIAGNOSIS — R79.9 ABNORMAL FINDING OF BLOOD CHEMISTRY, UNSPECIFIED: ICD-10-CM

## 2023-03-28 DIAGNOSIS — Z12.5 ENCOUNTER FOR SCREENING FOR MALIGNANT NEOPLASM OF PROSTATE: ICD-10-CM

## 2023-03-28 DIAGNOSIS — Z00.00 WELL ADULT EXAM: ICD-10-CM

## 2023-03-28 DIAGNOSIS — E78.5 DYSLIPIDEMIA: ICD-10-CM

## 2023-03-28 LAB
ALBUMIN SERPL BCP-MCNC: 3.9 G/DL (ref 3.5–5.2)
ALP SERPL-CCNC: 62 U/L (ref 55–135)
ALT SERPL W/O P-5'-P-CCNC: 32 U/L (ref 10–44)
ANION GAP SERPL CALC-SCNC: 9 MMOL/L (ref 8–16)
AST SERPL-CCNC: 32 U/L (ref 10–40)
BASOPHILS # BLD AUTO: 0.02 K/UL (ref 0–0.2)
BASOPHILS NFR BLD: 0.3 % (ref 0–1.9)
BILIRUB SERPL-MCNC: 0.6 MG/DL (ref 0.1–1)
BUN SERPL-MCNC: 16 MG/DL (ref 8–23)
CALCIUM SERPL-MCNC: 9.1 MG/DL (ref 8.7–10.5)
CHLORIDE SERPL-SCNC: 105 MMOL/L (ref 95–110)
CHOLEST SERPL-MCNC: 133 MG/DL (ref 120–199)
CHOLEST/HDLC SERPL: 3.9 {RATIO} (ref 2–5)
CO2 SERPL-SCNC: 25 MMOL/L (ref 23–29)
COMPLEXED PSA SERPL-MCNC: 2.3 NG/ML (ref 0–4)
CREAT SERPL-MCNC: 1.1 MG/DL (ref 0.5–1.4)
DIFFERENTIAL METHOD: ABNORMAL
EOSINOPHIL # BLD AUTO: 0.1 K/UL (ref 0–0.5)
EOSINOPHIL NFR BLD: 1.7 % (ref 0–8)
ERYTHROCYTE [DISTWIDTH] IN BLOOD BY AUTOMATED COUNT: 13.2 % (ref 11.5–14.5)
EST. GFR  (NO RACE VARIABLE): >60 ML/MIN/1.73 M^2
ESTIMATED AVG GLUCOSE: 105 MG/DL (ref 68–131)
GLUCOSE SERPL-MCNC: 94 MG/DL (ref 70–110)
HBA1C MFR BLD: 5.3 % (ref 4–5.6)
HCT VFR BLD AUTO: 43.8 % (ref 40–54)
HDLC SERPL-MCNC: 34 MG/DL (ref 40–75)
HDLC SERPL: 25.6 % (ref 20–50)
HGB BLD-MCNC: 14.9 G/DL (ref 14–18)
IMM GRANULOCYTES # BLD AUTO: 0.02 K/UL (ref 0–0.04)
IMM GRANULOCYTES NFR BLD AUTO: 0.3 % (ref 0–0.5)
LDLC SERPL CALC-MCNC: 61.4 MG/DL (ref 63–159)
LYMPHOCYTES # BLD AUTO: 3.1 K/UL (ref 1–4.8)
LYMPHOCYTES NFR BLD: 42.7 % (ref 18–48)
MCH RBC QN AUTO: 31.5 PG (ref 27–31)
MCHC RBC AUTO-ENTMCNC: 34 G/DL (ref 32–36)
MCV RBC AUTO: 93 FL (ref 82–98)
MONOCYTES # BLD AUTO: 0.5 K/UL (ref 0.3–1)
MONOCYTES NFR BLD: 7.5 % (ref 4–15)
NEUTROPHILS # BLD AUTO: 3.5 K/UL (ref 1.8–7.7)
NEUTROPHILS NFR BLD: 47.5 % (ref 38–73)
NONHDLC SERPL-MCNC: 99 MG/DL
NRBC BLD-RTO: 0 /100 WBC
PLATELET # BLD AUTO: 184 K/UL (ref 150–450)
PMV BLD AUTO: 10.6 FL (ref 9.2–12.9)
POTASSIUM SERPL-SCNC: 4.3 MMOL/L (ref 3.5–5.1)
PROT SERPL-MCNC: 7.2 G/DL (ref 6–8.4)
RBC # BLD AUTO: 4.73 M/UL (ref 4.6–6.2)
SODIUM SERPL-SCNC: 139 MMOL/L (ref 136–145)
TRIGL SERPL-MCNC: 188 MG/DL (ref 30–150)
TSH SERPL DL<=0.005 MIU/L-ACNC: 2.9 UIU/ML (ref 0.4–4)
URATE SERPL-MCNC: 4.7 MG/DL (ref 3.4–7)
WBC # BLD AUTO: 7.24 K/UL (ref 3.9–12.7)

## 2023-03-28 PROCEDURE — 36415 COLL VENOUS BLD VENIPUNCTURE: CPT | Mod: HCNC,PO | Performed by: INTERNAL MEDICINE

## 2023-03-28 PROCEDURE — 84153 ASSAY OF PSA TOTAL: CPT | Mod: HCNC | Performed by: INTERNAL MEDICINE

## 2023-03-28 PROCEDURE — 80061 LIPID PANEL: CPT | Mod: HCNC | Performed by: INTERNAL MEDICINE

## 2023-03-28 PROCEDURE — 84443 ASSAY THYROID STIM HORMONE: CPT | Mod: HCNC | Performed by: INTERNAL MEDICINE

## 2023-03-28 PROCEDURE — 83036 HEMOGLOBIN GLYCOSYLATED A1C: CPT | Mod: HCNC | Performed by: INTERNAL MEDICINE

## 2023-03-28 PROCEDURE — 80053 COMPREHEN METABOLIC PANEL: CPT | Mod: HCNC | Performed by: INTERNAL MEDICINE

## 2023-03-28 PROCEDURE — 84550 ASSAY OF BLOOD/URIC ACID: CPT | Mod: HCNC | Performed by: INTERNAL MEDICINE

## 2023-03-28 PROCEDURE — 85025 COMPLETE CBC W/AUTO DIFF WBC: CPT | Mod: HCNC | Performed by: INTERNAL MEDICINE

## 2023-04-04 ENCOUNTER — OFFICE VISIT (OUTPATIENT)
Dept: INTERNAL MEDICINE | Facility: CLINIC | Age: 78
End: 2023-04-04
Payer: MEDICARE

## 2023-04-04 VITALS
SYSTOLIC BLOOD PRESSURE: 118 MMHG | WEIGHT: 203.25 LBS | BODY MASS INDEX: 27.57 KG/M2 | RESPIRATION RATE: 14 BRPM | HEART RATE: 66 BPM | DIASTOLIC BLOOD PRESSURE: 64 MMHG

## 2023-04-04 DIAGNOSIS — I25.84 CORONARY ARTERY CALCIFICATION: ICD-10-CM

## 2023-04-04 DIAGNOSIS — N40.0 BENIGN PROSTATIC HYPERPLASIA, UNSPECIFIED WHETHER LOWER URINARY TRACT SYMPTOMS PRESENT: ICD-10-CM

## 2023-04-04 DIAGNOSIS — I10 ESSENTIAL HYPERTENSION: Primary | ICD-10-CM

## 2023-04-04 DIAGNOSIS — I25.10 CORONARY ARTERY CALCIFICATION: ICD-10-CM

## 2023-04-04 DIAGNOSIS — G47.33 OSA ON CPAP: ICD-10-CM

## 2023-04-04 DIAGNOSIS — G47.09 OTHER INSOMNIA: ICD-10-CM

## 2023-04-04 DIAGNOSIS — E78.49 OTHER HYPERLIPIDEMIA: ICD-10-CM

## 2023-04-04 DIAGNOSIS — M51.36 DDD (DEGENERATIVE DISC DISEASE), LUMBAR: ICD-10-CM

## 2023-04-04 PROCEDURE — 1160F RVW MEDS BY RX/DR IN RCRD: CPT | Mod: CPTII,S$GLB,, | Performed by: INTERNAL MEDICINE

## 2023-04-04 PROCEDURE — 3078F DIAST BP <80 MM HG: CPT | Mod: CPTII,S$GLB,, | Performed by: INTERNAL MEDICINE

## 2023-04-04 PROCEDURE — 1159F PR MEDICATION LIST DOCUMENTED IN MEDICAL RECORD: ICD-10-PCS | Mod: CPTII,S$GLB,, | Performed by: INTERNAL MEDICINE

## 2023-04-04 PROCEDURE — 3074F SYST BP LT 130 MM HG: CPT | Mod: CPTII,S$GLB,, | Performed by: INTERNAL MEDICINE

## 2023-04-04 PROCEDURE — 99214 OFFICE O/P EST MOD 30 MIN: CPT | Mod: S$GLB,,, | Performed by: INTERNAL MEDICINE

## 2023-04-04 PROCEDURE — 99999 PR PBB SHADOW E&M-EST. PATIENT-LVL III: ICD-10-PCS | Mod: PBBFAC,,, | Performed by: INTERNAL MEDICINE

## 2023-04-04 PROCEDURE — 99214 PR OFFICE/OUTPT VISIT, EST, LEVL IV, 30-39 MIN: ICD-10-PCS | Mod: S$GLB,,, | Performed by: INTERNAL MEDICINE

## 2023-04-04 PROCEDURE — 3044F PR MOST RECENT HEMOGLOBIN A1C LEVEL <7.0%: ICD-10-PCS | Mod: CPTII,S$GLB,, | Performed by: INTERNAL MEDICINE

## 2023-04-04 PROCEDURE — 3044F HG A1C LEVEL LT 7.0%: CPT | Mod: CPTII,S$GLB,, | Performed by: INTERNAL MEDICINE

## 2023-04-04 PROCEDURE — 3074F PR MOST RECENT SYSTOLIC BLOOD PRESSURE < 130 MM HG: ICD-10-PCS | Mod: CPTII,S$GLB,, | Performed by: INTERNAL MEDICINE

## 2023-04-04 PROCEDURE — 99999 PR PBB SHADOW E&M-EST. PATIENT-LVL III: CPT | Mod: PBBFAC,,, | Performed by: INTERNAL MEDICINE

## 2023-04-04 PROCEDURE — 1159F MED LIST DOCD IN RCRD: CPT | Mod: CPTII,S$GLB,, | Performed by: INTERNAL MEDICINE

## 2023-04-04 PROCEDURE — 3078F PR MOST RECENT DIASTOLIC BLOOD PRESSURE < 80 MM HG: ICD-10-PCS | Mod: CPTII,S$GLB,, | Performed by: INTERNAL MEDICINE

## 2023-04-04 PROCEDURE — 1160F PR REVIEW ALL MEDS BY PRESCRIBER/CLIN PHARMACIST DOCUMENTED: ICD-10-PCS | Mod: CPTII,S$GLB,, | Performed by: INTERNAL MEDICINE

## 2023-04-04 RX ORDER — HYDROCHLOROTHIAZIDE 12.5 MG/1
12.5 TABLET ORAL DAILY
Qty: 90 TABLET | Refills: 3 | Status: SHIPPED | OUTPATIENT
Start: 2023-04-04

## 2023-04-04 RX ORDER — ROSUVASTATIN CALCIUM 10 MG/1
10 TABLET, COATED ORAL DAILY
Qty: 90 TABLET | Refills: 3 | Status: SHIPPED | OUTPATIENT
Start: 2023-04-04 | End: 2024-01-25

## 2023-04-04 RX ORDER — LOSARTAN POTASSIUM 50 MG/1
50 TABLET ORAL DAILY
Qty: 90 TABLET | Refills: 3 | Status: SHIPPED | OUTPATIENT
Start: 2023-04-04

## 2023-04-10 ENCOUNTER — CLINICAL SUPPORT (OUTPATIENT)
Dept: CARDIOLOGY | Facility: HOSPITAL | Age: 78
End: 2023-04-10
Payer: MEDICARE

## 2023-04-10 DIAGNOSIS — Z95.818 PRESENCE OF OTHER CARDIAC IMPLANTS AND GRAFTS: ICD-10-CM

## 2023-04-10 PROCEDURE — G2066 INTER DEVC REMOTE 30D: HCPCS | Mod: HCNC | Performed by: INTERNAL MEDICINE

## 2023-05-03 PROBLEM — I25.10 CORONARY ARTERY CALCIFICATION: Status: ACTIVE | Noted: 2023-05-03

## 2023-05-03 PROBLEM — I25.84 CORONARY ARTERY CALCIFICATION: Status: ACTIVE | Noted: 2023-05-03

## 2023-05-03 PROBLEM — N40.0 BENIGN PROSTATIC HYPERPLASIA: Status: ACTIVE | Noted: 2023-05-03

## 2023-05-04 NOTE — PROGRESS NOTES
Subjective:       Patient ID: Maegan Cleary is a 77 y.o. male.    Chief Complaint: Annual Exam, Follow-up, Hypertension, and Low-back Pain    HPI  The patient presents for annual examination and follow-up of medical conditions which include hypertension, lower back pain dyslipidemia, obstructive sleep apnea-on CPAP therapy, and BPH.  The patient states he has been using gabapentin at bedtime.  He performs stretching exercises every morning.  He has been going to the gym 4 to 5 times a week along with his wife.  He does perform and will be and resistance exercises.  He has been feeling well overall.  He has chronic lower back pain which has been managed with gabapentin and his exercises.  He averages 8-9 hours of sleep at night.  He uses his CPAP machine for management of obstructive sleep apnea.  No daytime sleepiness is noted.    No interval change in past medical history, family history, or social history since prior evaluations.  The patient is a retired physician.    Immunization record was reviewed.      Screening tests were reviewed.    Review of Systems   Constitutional:  Negative for activity change, appetite change, fatigue and unexpected weight change.   HENT:  Negative for sinus pressure/congestion and sore throat.    Eyes:  Negative for visual disturbance.   Respiratory:  Negative for cough, chest tightness, shortness of breath and wheezing.    Cardiovascular:  Negative for chest pain, palpitations and leg swelling.   Gastrointestinal:  Negative for abdominal pain, blood in stool, nausea and vomiting.   Genitourinary:  Negative for dysuria, hematuria and urgency.   Musculoskeletal:  Positive for back pain. Negative for arthralgias, gait problem, joint swelling, myalgias and neck stiffness.   Integumentary:  Negative for color change and rash.   Neurological:  Negative for dizziness, syncope, weakness, light-headedness, numbness and headaches.   Psychiatric/Behavioral:  Negative for sleep disturbance.            Physical Exam  Vitals and nursing note reviewed.   Constitutional:       General: He is not in acute distress.     Appearance: Normal appearance. He is well-developed.      Comments: The patient has gained 5 lb since 04/01/2022.   HENT:      Head: Normocephalic and atraumatic.      Right Ear: External ear normal.      Left Ear: External ear normal.      Mouth/Throat:      Pharynx: Oropharynx is clear. No oropharyngeal exudate.   Eyes:      General: No scleral icterus.     Extraocular Movements: Extraocular movements intact.      Conjunctiva/sclera: Conjunctivae normal.   Neck:      Thyroid: No thyromegaly.      Vascular: No JVD.   Cardiovascular:      Rate and Rhythm: Normal rate and regular rhythm.      Pulses: Normal pulses.      Heart sounds: Normal heart sounds. No murmur heard.    No friction rub. No gallop.   Pulmonary:      Effort: Pulmonary effort is normal. No respiratory distress.      Breath sounds: Normal breath sounds. No wheezing or rales.   Abdominal:      General: Bowel sounds are normal. There is no distension.      Palpations: Abdomen is soft. There is no mass.      Tenderness: There is no abdominal tenderness. There is no guarding.   Musculoskeletal:         General: No tenderness. Normal range of motion.      Cervical back: Normal range of motion and neck supple.      Right lower leg: No edema.      Left lower leg: No edema.      Comments: Back:  Negative straight leg raising test bilaterally.  No vertebral percussion tenderness.  No CVA tenderness is noted.    Hips:  Range of motion is intact bilaterally.    Knees:  Normal flexion and extension noted bilaterally.  No effusions are present.   Lymphadenopathy:      Cervical: No cervical adenopathy.   Skin:     General: Skin is warm and dry.      Findings: No rash.   Neurological:      General: No focal deficit present.      Mental Status: He is alert and oriented to person, place, and time.      Cranial Nerves: No cranial nerve deficit.       Motor: No abnormal muscle tone.      Deep Tendon Reflexes: Reflexes are normal and symmetric.   Psychiatric:         Behavior: Behavior normal.         Thought Content: Thought content normal.         Lab Visit on 03/28/2023   Component Date Value Ref Range Status    Sodium 03/28/2023 139  136 - 145 mmol/L Final    Potassium 03/28/2023 4.3  3.5 - 5.1 mmol/L Final    Chloride 03/28/2023 105  95 - 110 mmol/L Final    CO2 03/28/2023 25  23 - 29 mmol/L Final    Glucose 03/28/2023 94  70 - 110 mg/dL Final    BUN 03/28/2023 16  8 - 23 mg/dL Final    Creatinine 03/28/2023 1.1  0.5 - 1.4 mg/dL Final    Calcium 03/28/2023 9.1  8.7 - 10.5 mg/dL Final    Total Protein 03/28/2023 7.2  6.0 - 8.4 g/dL Final    Albumin 03/28/2023 3.9  3.5 - 5.2 g/dL Final    Total Bilirubin 03/28/2023 0.6  0.1 - 1.0 mg/dL Final    Comment: For infants and newborns, interpretation of results should be based  on gestational age, weight and in agreement with clinical  observations.    Premature Infant recommended reference ranges:  Up to 24 hours.............<8.0 mg/dL  Up to 48 hours............<12.0 mg/dL  3-5 days..................<15.0 mg/dL  6-29 days.................<15.0 mg/dL      Alkaline Phosphatase 03/28/2023 62  55 - 135 U/L Final    AST 03/28/2023 32  10 - 40 U/L Final    ALT 03/28/2023 32  10 - 44 U/L Final    Anion Gap 03/28/2023 9  8 - 16 mmol/L Final    eGFR 03/28/2023 >60.0  >60 mL/min/1.73 m^2 Final    Cholesterol 03/28/2023 133  120 - 199 mg/dL Final    Comment: The National Cholesterol Education Program (NCEP) has set the  following guidelines (reference ranges) for Cholesterol:  Optimal.....................<200 mg/dL  Borderline High.............200-239 mg/dL  High........................> or = 240 mg/dL      Triglycerides 03/28/2023 188 (H)  30 - 150 mg/dL Final    Comment: The National Cholesterol Education Program (NCEP) has set the  following guidelines (reference values) for triglycerides:  Normal......................<150  mg/dL  Borderline High.............150-199 mg/dL  High........................200-499 mg/dL      HDL 03/28/2023 34 (L)  40 - 75 mg/dL Final    Comment: The National Cholesterol Education Program (NCEP) has set the  following guidelines (reference values) for HDL Cholesterol:  Low...............<40 mg/dL  Optimal...........>60 mg/dL      LDL Cholesterol 03/28/2023 61.4 (L)  63.0 - 159.0 mg/dL Final    Comment: The National Cholesterol Education Program (NCEP) has set the  following guidelines (reference values) for LDL Cholesterol:  Optimal.......................<130 mg/dL  Borderline High...............130-159 mg/dL  High..........................160-189 mg/dL  Very High.....................>190 mg/dL      HDL/Cholesterol Ratio 03/28/2023 25.6  20.0 - 50.0 % Final    Total Cholesterol/HDL Ratio 03/28/2023 3.9  2.0 - 5.0 Final    Non-HDL Cholesterol 03/28/2023 99  mg/dL Final    Comment: Risk category and Non-HDL cholesterol goals:  Coronary heart disease (CHD)or equivalent (10-year risk of CHD >20%):  Non-HDL cholesterol goal     <130 mg/dL  Two or more CHD risk factors and 10-year risk of CHD <= 20%:  Non-HDL cholesterol goal     <160 mg/dL  0 to 1 CHD risk factor:  Non-HDL cholesterol goal     <190 mg/dL      WBC 03/28/2023 7.24  3.90 - 12.70 K/uL Final    RBC 03/28/2023 4.73  4.60 - 6.20 M/uL Final    Hemoglobin 03/28/2023 14.9  14.0 - 18.0 g/dL Final    Hematocrit 03/28/2023 43.8  40.0 - 54.0 % Final    MCV 03/28/2023 93  82 - 98 fL Final    MCH 03/28/2023 31.5 (H)  27.0 - 31.0 pg Final    MCHC 03/28/2023 34.0  32.0 - 36.0 g/dL Final    RDW 03/28/2023 13.2  11.5 - 14.5 % Final    Platelets 03/28/2023 184  150 - 450 K/uL Final    MPV 03/28/2023 10.6  9.2 - 12.9 fL Final    Immature Granulocytes 03/28/2023 0.3  0.0 - 0.5 % Final    Gran # (ANC) 03/28/2023 3.5  1.8 - 7.7 K/uL Final    Immature Grans (Abs) 03/28/2023 0.02  0.00 - 0.04 K/uL Final    Comment: Mild elevation in immature granulocytes is non specific and    can be seen in a variety of conditions including stress response,   acute inflammation, trauma and pregnancy. Correlation with other   laboratory and clinical findings is essential.      Lymph # 03/28/2023 3.1  1.0 - 4.8 K/uL Final    Mono # 03/28/2023 0.5  0.3 - 1.0 K/uL Final    Eos # 03/28/2023 0.1  0.0 - 0.5 K/uL Final    Baso # 03/28/2023 0.02  0.00 - 0.20 K/uL Final    nRBC 03/28/2023 0  0 /100 WBC Final    Gran % 03/28/2023 47.5  38.0 - 73.0 % Final    Lymph % 03/28/2023 42.7  18.0 - 48.0 % Final    Mono % 03/28/2023 7.5  4.0 - 15.0 % Final    Eosinophil % 03/28/2023 1.7  0.0 - 8.0 % Final    Basophil % 03/28/2023 0.3  0.0 - 1.9 % Final    Differential Method 03/28/2023 Automated   Final    TSH 03/28/2023 2.895  0.400 - 4.000 uIU/mL Final    PSA, Screen 03/28/2023 2.3  0.00 - 4.00 ng/mL Final    Comment: The testing method is a chemiluminescent microparticle immunoassay   manufactured by [employer] and performed on the    or   [employer] system. Values obtained with different assay manufacturers   for   methods may be different and cannot be used interchangeably.  PSA Expected levels:  Hormonal Therapy: <0.05 ng/ml  Prostatectomy: <0.01 ng/ml  Radiation Therapy: <1.00 ng/ml      Hemoglobin A1C 03/28/2023 5.3  4.0 - 5.6 % Final    Comment: ADA Screening Guidelines:  5.7-6.4%  Consistent with prediabetes  >or=6.5%  Consistent with diabetes    High levels of fetal hemoglobin interfere with the HbA1C  assay. Heterozygous hemoglobin variants (HbS, HgC, etc)do  not significantly interfere with this assay.   However, presence of multiple variants may affect accuracy.      Estimated Avg Glucose 03/28/2023 105  68 - 131 mg/dL Final    Uric Acid 03/28/2023 4.7  3.4 - 7.0 mg/dL Final       Assessment & Plan:      Maegan was seen today for annual exam, follow-up, hypertension and low-back pain.  The patient is clinically stable on current therapy.  He has been encouraged to continue his regular  exercise routine and to lose weight.  Current medications will be continued for management of hypertension and hyperlipidemia.  The patient is using OTC supplements such as saw palmetto and pumpkin seed for BPH with good results.    Diagnoses and all orders for this visit:    Essential hypertension    CRIS on CPAP    Other hyperlipidemia    Other insomnia    DDD (degenerative disc disease), lumbar    Coronary artery calcification  -     rosuvastatin (CRESTOR) 10 MG tablet; Take 1 tablet (10 mg total) by mouth once daily.    Benign prostatic hyperplasia, unspecified whether lower urinary tract symptoms present    Other orders  -     hydroCHLOROthiazide (HYDRODIURIL) 12.5 MG Tab; Take 1 tablet (12.5 mg total) by mouth once daily.  -     losartan (COZAAR) 50 MG tablet; Take 1 tablet (50 mg total) by mouth once daily.         Follow up in about 1 year (around 4/4/2024).     Leandro Carr MD

## 2023-05-10 ENCOUNTER — CLINICAL SUPPORT (OUTPATIENT)
Dept: CARDIOLOGY | Facility: HOSPITAL | Age: 78
End: 2023-05-10
Payer: MEDICARE

## 2023-05-10 DIAGNOSIS — Z95.818 PRESENCE OF OTHER CARDIAC IMPLANTS AND GRAFTS: ICD-10-CM

## 2023-05-10 PROCEDURE — 93298 REM INTERROG DEV EVAL SCRMS: CPT | Mod: ,,, | Performed by: INTERNAL MEDICINE

## 2023-05-10 PROCEDURE — G2066 INTER DEVC REMOTE 30D: HCPCS | Performed by: INTERNAL MEDICINE

## 2023-05-10 PROCEDURE — 93298 CARDIAC DEVICE CHECK - REMOTE: ICD-10-PCS | Mod: ,,, | Performed by: INTERNAL MEDICINE

## 2023-06-09 ENCOUNTER — CLINICAL SUPPORT (OUTPATIENT)
Dept: CARDIOLOGY | Facility: HOSPITAL | Age: 78
End: 2023-06-09
Payer: MEDICARE

## 2023-06-09 DIAGNOSIS — Z95.818 PRESENCE OF OTHER CARDIAC IMPLANTS AND GRAFTS: ICD-10-CM

## 2023-06-09 PROCEDURE — G2066 INTER DEVC REMOTE 30D: HCPCS | Performed by: INTERNAL MEDICINE

## 2023-07-05 ENCOUNTER — TELEPHONE (OUTPATIENT)
Dept: ELECTROPHYSIOLOGY | Facility: CLINIC | Age: 78
End: 2023-07-05
Payer: MEDICARE

## 2023-07-05 NOTE — TELEPHONE ENCOUNTER
[facility] [employer] [employer] ILR alert received for possible new onset AF episode detected on 7/4/23 @ 16:08 lasting 3 hours and 26 minutes. Patient not on OACs. Called and left VM on patient's preferred number requesting call back to assess symptoms and medication compliance. Will update MD.

## 2023-07-05 NOTE — TELEPHONE ENCOUNTER
Spoke with patient who reported being asymptomatic to event. States he took a nap for about half an hour at time of event and then started watching the news. States he has been feeling overall well. Will update MD.

## 2023-07-09 ENCOUNTER — CLINICAL SUPPORT (OUTPATIENT)
Dept: CARDIOLOGY | Facility: HOSPITAL | Age: 78
End: 2023-07-09
Payer: MEDICARE

## 2023-07-09 DIAGNOSIS — Z95.818 PRESENCE OF OTHER CARDIAC IMPLANTS AND GRAFTS: ICD-10-CM

## 2023-07-09 PROCEDURE — 93298 CARDIAC DEVICE CHECK - REMOTE: ICD-10-PCS | Mod: HCNC,,, | Performed by: INTERNAL MEDICINE

## 2023-07-09 PROCEDURE — G2066 INTER DEVC REMOTE 30D: HCPCS | Mod: HCNC | Performed by: INTERNAL MEDICINE

## 2023-07-09 PROCEDURE — 93298 REM INTERROG DEV EVAL SCRMS: CPT | Mod: HCNC,,, | Performed by: INTERNAL MEDICINE

## 2023-07-24 ENCOUNTER — TELEPHONE (OUTPATIENT)
Dept: CARDIOLOGY | Facility: CLINIC | Age: 78
End: 2023-07-24
Payer: MEDICARE

## 2023-07-24 DIAGNOSIS — I10 PRIMARY HYPERTENSION: Primary | ICD-10-CM

## 2023-08-02 ENCOUNTER — HOSPITAL ENCOUNTER (OUTPATIENT)
Dept: CARDIOLOGY | Facility: HOSPITAL | Age: 78
Discharge: HOME | End: 2023-08-02
Attending: INTERNAL MEDICINE
Payer: MEDICARE

## 2023-08-02 VITALS — HEIGHT: 72 IN | BODY MASS INDEX: 27.5 KG/M2 | WEIGHT: 203 LBS

## 2023-08-02 DIAGNOSIS — I10 PRIMARY HYPERTENSION: ICD-10-CM

## 2023-08-02 LAB
AORTIC ROOT ANNULUS: 2.5 CM
ASCENDING AORTA: 3.69 CM
AV INDEX (PROSTH): 0.61
AV MEAN GRADIENT: 6 MMHG
AV PEAK GRADIENT: 11 MMHG
AV VALVE AREA BY VELOCITY RATIO: 2.51 CM²
AV VALVE AREA: 2.41 CM²
AV VELOCITY RATIO: 0.64
BSA FOR ECHO PROCEDURE: 2.16 M2
CV ECHO LV RWT: 0.4 CM
DOP CALC AO PEAK VEL: 1.65 M/S
DOP CALC AO VTI: 33.4 CM
DOP CALC LVOT AREA: 3.9 CM2
DOP CALC LVOT DIAMETER: 2.24 CM
DOP CALC LVOT PEAK VEL: 1.05 M/S
DOP CALC LVOT STROKE VOLUME: 80.35 CM3
DOP CALCLVOT PEAK VEL VTI: 20.4 CM
ECHO LV POSTERIOR WALL: 1.03 CM (ref 0.6–1.1)
FRACTIONAL SHORTENING: 54 % (ref 28–44)
INTERVENTRICULAR SEPTUM: 1 CM (ref 0.6–1.1)
IVC DIAMETER: 1.41 CM
LA MAJOR: 7.63 CM
LA MINOR: 6.2 CM
LA WIDTH: 4.2 CM
LEFT ATRIUM SIZE: 5.5 CM
LEFT ATRIUM VOLUME INDEX MOD: 45.1 ML/M2
LEFT ATRIUM VOLUME INDEX: 62.8 ML/M2
LEFT ATRIUM VOLUME MOD: 96.6 CM3
LEFT ATRIUM VOLUME: 134.32 CM3
LEFT INTERNAL DIMENSION IN SYSTOLE: 2.4 CM (ref 2.1–4)
LEFT VENTRICLE DIASTOLIC VOLUME INDEX: 56.96 ML/M2
LEFT VENTRICLE DIASTOLIC VOLUME: 121.9 ML
LEFT VENTRICLE MASS INDEX: 93 G/M2
LEFT VENTRICLE SYSTOLIC VOLUME INDEX: 19.7 ML/M2
LEFT VENTRICLE SYSTOLIC VOLUME: 42.18 ML
LEFT VENTRICULAR INTERNAL DIMENSION IN DIASTOLE: 5.2 CM (ref 3.5–6)
LEFT VENTRICULAR MASS: 198.05 G
LVOT MG: 2.26 MMHG
LVOT MV: 0.71 CM/S
PISA TR MAX VEL: 2.89 M/S
PULM VEIN S/D RATIO: 0.76
PV MV: 0.82 M/S
PV PEAK D VEL: 0.74 M/S
PV PEAK GRADIENT: 4 MMHG
PV PEAK S VEL: 0.56 M/S
PV PEAK VELOCITY: 1.01 M/S
RA MAJOR: 6.4 CM
RA PRESSURE ESTIMATED: 3 MMHG
RA WIDTH: 4.7 CM
RIGHT VENTRICULAR END-DIASTOLIC DIMENSION: 3.97 CM
RV TB RVSP: 6 MMHG
RV TISSUE DOPPLER FREE WALL SYSTOLIC VELOCITY 1 (APICAL 4 CHAMBER VIEW): 13.73 CM/S
STJ: 2.65 CM
TR MAX PG: 33 MMHG
TV REST PULMONARY ARTERY PRESSURE: 36 MMHG
Z-SCORE OF LEFT VENTRICULAR DIMENSION IN END DIASTOLE: -2.8
Z-SCORE OF LEFT VENTRICULAR DIMENSION IN END SYSTOLE: -4.42

## 2023-08-02 PROCEDURE — 93306 TTE W/DOPPLER COMPLETE: CPT | Mod: 26,HCNC,, | Performed by: INTERNAL MEDICINE

## 2023-08-02 PROCEDURE — 93306 ECHO (CUPID ONLY): ICD-10-PCS | Mod: 26,HCNC,, | Performed by: INTERNAL MEDICINE

## 2023-08-02 PROCEDURE — 93306 TTE W/DOPPLER COMPLETE: CPT | Mod: HCNC

## 2023-08-04 ENCOUNTER — TELEPHONE (OUTPATIENT)
Dept: CARDIOLOGY | Facility: CLINIC | Age: 78
End: 2023-08-04
Payer: MEDICARE

## 2023-08-04 NOTE — TELEPHONE ENCOUNTER
Echo was scheduled and completed 8-2-23.     called patient with Test results.        Nw                            Please order echo         Please set him for a phone call thereafter         Thanks        ZN

## 2023-08-08 ENCOUNTER — CLINICAL SUPPORT (OUTPATIENT)
Dept: CARDIOLOGY | Facility: HOSPITAL | Age: 78
End: 2023-08-08
Payer: MEDICARE

## 2023-08-08 DIAGNOSIS — Z95.818 PRESENCE OF OTHER CARDIAC IMPLANTS AND GRAFTS: ICD-10-CM

## 2023-08-08 PROCEDURE — G2066 INTER DEVC REMOTE 30D: HCPCS | Mod: HCNC | Performed by: INTERNAL MEDICINE

## 2023-09-07 ENCOUNTER — CLINICAL SUPPORT (OUTPATIENT)
Dept: CARDIOLOGY | Facility: HOSPITAL | Age: 78
End: 2023-09-07
Payer: MEDICARE

## 2023-09-07 DIAGNOSIS — Z95.818 PRESENCE OF OTHER CARDIAC IMPLANTS AND GRAFTS: ICD-10-CM

## 2023-09-07 PROCEDURE — 93298 CARDIAC DEVICE CHECK - REMOTE: ICD-10-PCS | Mod: ,,, | Performed by: INTERNAL MEDICINE

## 2023-09-07 PROCEDURE — 93298 REM INTERROG DEV EVAL SCRMS: CPT | Mod: ,,, | Performed by: INTERNAL MEDICINE

## 2023-09-07 PROCEDURE — G2066 INTER DEVC REMOTE 30D: HCPCS | Performed by: INTERNAL MEDICINE

## 2023-09-18 RX ORDER — NEBIVOLOL 10 MG/1
10 TABLET ORAL
Qty: 60 TABLET | Refills: 0 | Status: SHIPPED | OUTPATIENT
Start: 2023-09-18

## 2023-10-07 ENCOUNTER — CLINICAL SUPPORT (OUTPATIENT)
Dept: CARDIOLOGY | Facility: HOSPITAL | Age: 78
End: 2023-10-07
Payer: MEDICARE

## 2023-10-07 DIAGNOSIS — Z95.818 PRESENCE OF OTHER CARDIAC IMPLANTS AND GRAFTS: ICD-10-CM

## 2023-10-07 PROCEDURE — G2066 INTER DEVC REMOTE 30D: HCPCS | Mod: HCNC | Performed by: INTERNAL MEDICINE

## 2023-10-27 ENCOUNTER — TELEPHONE (OUTPATIENT)
Dept: INTERNAL MEDICINE | Facility: CLINIC | Age: 78
End: 2023-10-27
Payer: MEDICARE

## 2023-10-27 ENCOUNTER — PATIENT MESSAGE (OUTPATIENT)
Dept: INTERNAL MEDICINE | Facility: CLINIC | Age: 78
End: 2023-10-27
Payer: MEDICARE

## 2023-10-27 DIAGNOSIS — I10 ESSENTIAL HYPERTENSION: ICD-10-CM

## 2023-10-27 DIAGNOSIS — Z12.5 ENCOUNTER FOR SCREENING FOR MALIGNANT NEOPLASM OF PROSTATE: ICD-10-CM

## 2023-10-27 DIAGNOSIS — R79.9 ABNORMAL FINDING OF BLOOD CHEMISTRY, UNSPECIFIED: ICD-10-CM

## 2023-10-27 DIAGNOSIS — Z00.00 WELL ADULT EXAM: Primary | ICD-10-CM

## 2023-10-27 NOTE — TELEPHONE ENCOUNTER
----- Message from Monica Valera sent at 10/27/2023  2:25 PM CDT -----  Contact: [phone]  type: Lab    Caller is requesting to schedule their Lab appointment prior to annual appointment.  Order is not listed in EPIC.  Please enter order and contact patient to schedule.    Name of Caller:pt     Preferred Date and Time of Labs: too far away     Date of Annual Physical Appointment:04/08/24    Where would they like the lab performed? [facility]     Would the patient rather a call back or a response via [facility]? Call     Best Call Back Number:[phone]    Additional Information:

## 2023-11-07 ENCOUNTER — CLINICAL SUPPORT (OUTPATIENT)
Dept: CARDIOLOGY | Facility: HOSPITAL | Age: 78
End: 2023-11-07
Payer: MEDICARE

## 2023-11-07 ENCOUNTER — CLINICAL SUPPORT (OUTPATIENT)
Dept: CARDIOLOGY | Facility: HOSPITAL | Age: 78
End: 2023-11-07
Attending: INTERNAL MEDICINE
Payer: MEDICARE

## 2023-11-07 DIAGNOSIS — Z95.818 PRESENCE OF OTHER CARDIAC IMPLANTS AND GRAFTS: ICD-10-CM

## 2023-11-07 LAB
OHS CV AF BURDEN PERCENT: < 1
OHS CV DC REMOTE DEVICE TYPE: NORMAL

## 2023-11-07 PROCEDURE — G2066 INTER DEVC REMOTE 30D: HCPCS | Mod: HCNC | Performed by: INTERNAL MEDICINE

## 2023-11-07 PROCEDURE — 93298 REM INTERROG DEV EVAL SCRMS: CPT | Mod: HCNC,,, | Performed by: INTERNAL MEDICINE

## 2023-11-07 PROCEDURE — 93298 CARDIAC DEVICE CHECK - REMOTE: ICD-10-PCS | Mod: HCNC,,, | Performed by: INTERNAL MEDICINE

## 2023-11-15 RX ORDER — HYDROXYZINE PAMOATE 50 MG/1
50 CAPSULE ORAL
Qty: 90 CAPSULE | Refills: 0 | Status: SHIPPED | OUTPATIENT
Start: 2023-11-15

## 2023-11-28 ENCOUNTER — TELEPHONE (OUTPATIENT)
Dept: ELECTROPHYSIOLOGY | Facility: CLINIC | Age: 78
End: 2023-11-28
Payer: MEDICARE

## 2023-12-08 ENCOUNTER — TELEPHONE (OUTPATIENT)
Dept: ELECTROPHYSIOLOGY | Facility: CLINIC | Age: 78
End: 2023-12-08
Payer: MEDICARE

## 2023-12-08 DIAGNOSIS — I49.9 CARDIAC ARRHYTHMIA, UNSPECIFIED CARDIAC ARRHYTHMIA TYPE: ICD-10-CM

## 2023-12-08 DIAGNOSIS — Z45.09 ENCOUNTER FOR LOOP RECORDER AT END OF BATTERY LIFE: ICD-10-CM

## 2023-12-08 DIAGNOSIS — R00.2 PALPITATIONS: Primary | ICD-10-CM

## 2023-12-08 NOTE — TELEPHONE ENCOUNTER
Spoke with patient and schedule ILR removal/reimplant for 1/16/2024. Will send all instructions to patient portal, as requested.

## 2023-12-08 NOTE — TELEPHONE ENCOUNTER
----- Message from Lyndsay Lopes sent at 12/8/2023 11:58 AM CST -----  Regarding: return call  Pt is returning your call and can be reached at [phone].    Thank you

## 2023-12-10 ENCOUNTER — CLINICAL SUPPORT (OUTPATIENT)
Dept: CARDIOLOGY | Facility: HOSPITAL | Age: 78
End: 2023-12-10
Attending: INTERNAL MEDICINE
Payer: MEDICARE

## 2023-12-10 ENCOUNTER — CLINICAL SUPPORT (OUTPATIENT)
Dept: CARDIOLOGY | Facility: HOSPITAL | Age: 78
End: 2023-12-10
Payer: MEDICARE

## 2023-12-10 DIAGNOSIS — Z95.818 PRESENCE OF OTHER CARDIAC IMPLANTS AND GRAFTS: ICD-10-CM

## 2023-12-10 PROCEDURE — 93298 CARDIAC DEVICE CHECK - REMOTE: ICD-10-PCS | Mod: HCNC,,, | Performed by: INTERNAL MEDICINE

## 2023-12-10 PROCEDURE — 93298 REM INTERROG DEV EVAL SCRMS: CPT | Mod: HCNC,,, | Performed by: INTERNAL MEDICINE

## 2023-12-10 PROCEDURE — G2066 INTER DEVC REMOTE 30D: HCPCS | Mod: HCNC | Performed by: INTERNAL MEDICINE

## 2023-12-20 ENCOUNTER — PATIENT MESSAGE (OUTPATIENT)
Dept: ELECTROPHYSIOLOGY | Facility: CLINIC | Age: 78
End: 2023-12-20
Payer: MEDICARE

## 2023-12-27 LAB
OHS CV AF BURDEN PERCENT: < 1
OHS CV DC REMOTE DEVICE TYPE: NORMAL

## 2024-01-01 ENCOUNTER — CLINICAL SUPPORT (OUTPATIENT)
Dept: CARDIOLOGY | Facility: HOSPITAL | Age: 79
End: 2024-01-01

## 2024-01-01 DIAGNOSIS — Z95.818 PRESENCE OF OTHER CARDIAC IMPLANTS AND GRAFTS: ICD-10-CM

## 2024-01-05 ENCOUNTER — TELEPHONE (OUTPATIENT)
Dept: ELECTROPHYSIOLOGY | Facility: CLINIC | Age: 79
End: 2024-01-05
Payer: MEDICARE

## 2024-01-05 NOTE — TELEPHONE ENCOUNTER
Spoke with Marleni. Clinicals were not sent. She is sending the clinicals in and will let us know if P2P is still needed.

## 2024-01-05 NOTE — TELEPHONE ENCOUNTER
----- Message from Campbell Martinez MA sent at 1/4/2024 11:19 AM CST -----    ----- Message -----  From: Marleni Eldridge  Sent: 1/4/2024  11:02 AM CST  To: Marshal Kurtz    Good afternoon,    This patient is scheduled for procedure 01-.   [employer] is requesting a peer to peer with the MD.    [employer] Tracking ID: [id number] Patient Name: Maegan Cleary Date(s) of Service :   01/16/2024 - 03/16/2024     What's happening? [employer] is requesting to schedule a [id number] conversation with your physician regarding a prior authorization request.     Why? We need more clinical information regarding this request. This conversation will allow us to determine the clinical appropriateness of your authorization and make a decision for timely care.     When? Please contact [employer] to schedule the [id number]. We ask that you call us as soon as possible to find a time that works.     How? Contact [employer] at [phone]. Please have the following information ready: , Tracking ID of the case , Name of MD, PA, or NP we will speak with ,Time and date requested (M-F 8-6pm EST)  Primary phone number and extension ,Secondary phone number and extension Please do not respond to this request by fax or email. More Information about what additional clinical information we need:     Decision Explanation: Recommended denial Recommend denial due to no recent documentation provided indicated plan for loop and not provided full device check report. No documentation of this being requested for cryptogenic stroke with afib as the suspected cause, and no nondiagnostic external monitor, and if to detect/characterize/document symptomatic transient arrhythmia. Unable to meet for 'other dysfunction or complication necessitating intervention' for the removal of loop due to needing confirmation of loop in place and the need for removal with recent documentation      Marleni Mcintyre

## 2024-01-09 ENCOUNTER — LAB VISIT (OUTPATIENT)
Dept: LAB | Facility: HOSPITAL | Age: 79
End: 2024-01-09
Attending: INTERNAL MEDICINE
Payer: MEDICARE

## 2024-01-09 DIAGNOSIS — I49.9 CARDIAC ARRHYTHMIA, UNSPECIFIED CARDIAC ARRHYTHMIA TYPE: ICD-10-CM

## 2024-01-09 DIAGNOSIS — R00.2 PALPITATIONS: ICD-10-CM

## 2024-01-09 DIAGNOSIS — Z45.09 ENCOUNTER FOR LOOP RECORDER AT END OF BATTERY LIFE: ICD-10-CM

## 2024-01-09 LAB
ANION GAP SERPL CALC-SCNC: 9 MMOL/L (ref 8–16)
APTT PPP: 31.3 SEC (ref 21–32)
BASOPHILS # BLD AUTO: 0.01 K/UL (ref 0–0.2)
BASOPHILS NFR BLD: 0.1 % (ref 0–1.9)
BUN SERPL-MCNC: 14 MG/DL (ref 8–23)
CALCIUM SERPL-MCNC: 8.8 MG/DL (ref 8.7–10.5)
CHLORIDE SERPL-SCNC: 105 MMOL/L (ref 95–110)
CO2 SERPL-SCNC: 25 MMOL/L (ref 23–29)
CREAT SERPL-MCNC: 0.9 MG/DL (ref 0.5–1.4)
DIFFERENTIAL METHOD BLD: ABNORMAL
EOSINOPHIL # BLD AUTO: 0.1 K/UL (ref 0–0.5)
EOSINOPHIL NFR BLD: 0.9 % (ref 0–8)
ERYTHROCYTE [DISTWIDTH] IN BLOOD BY AUTOMATED COUNT: 12.8 % (ref 11.5–14.5)
EST. GFR  (NO RACE VARIABLE): >60 ML/MIN/1.73 M^2
GLUCOSE SERPL-MCNC: 93 MG/DL (ref 70–110)
HCT VFR BLD AUTO: 45.4 % (ref 40–54)
HGB BLD-MCNC: 15.6 G/DL (ref 14–18)
IMM GRANULOCYTES # BLD AUTO: 0.02 K/UL (ref 0–0.04)
IMM GRANULOCYTES NFR BLD AUTO: 0.3 % (ref 0–0.5)
INR PPP: 1 (ref 0.8–1.2)
LYMPHOCYTES # BLD AUTO: 3 K/UL (ref 1–4.8)
LYMPHOCYTES NFR BLD: 40 % (ref 18–48)
MCH RBC QN AUTO: 31.7 PG (ref 27–31)
MCHC RBC AUTO-ENTMCNC: 34.4 G/DL (ref 32–36)
MCV RBC AUTO: 92 FL (ref 82–98)
MONOCYTES # BLD AUTO: 0.5 K/UL (ref 0.3–1)
MONOCYTES NFR BLD: 6.8 % (ref 4–15)
NEUTROPHILS # BLD AUTO: 3.8 K/UL (ref 1.8–7.7)
NEUTROPHILS NFR BLD: 51.9 % (ref 38–73)
NRBC BLD-RTO: 0 /100 WBC
PLATELET # BLD AUTO: 193 K/UL (ref 150–450)
PMV BLD AUTO: 10.2 FL (ref 9.2–12.9)
POTASSIUM SERPL-SCNC: 3.7 MMOL/L (ref 3.5–5.1)
PROTHROMBIN TIME: 11.1 SEC (ref 9–12.5)
RBC # BLD AUTO: 4.92 M/UL (ref 4.6–6.2)
SODIUM SERPL-SCNC: 139 MMOL/L (ref 136–145)
WBC # BLD AUTO: 7.4 K/UL (ref 3.9–12.7)

## 2024-01-09 PROCEDURE — 85730 THROMBOPLASTIN TIME PARTIAL: CPT | Mod: HCNC | Performed by: INTERNAL MEDICINE

## 2024-01-09 PROCEDURE — 36415 COLL VENOUS BLD VENIPUNCTURE: CPT | Mod: HCNC | Performed by: INTERNAL MEDICINE

## 2024-01-09 PROCEDURE — 85025 COMPLETE CBC W/AUTO DIFF WBC: CPT | Mod: HCNC | Performed by: INTERNAL MEDICINE

## 2024-01-09 PROCEDURE — 85610 PROTHROMBIN TIME: CPT | Mod: HCNC | Performed by: INTERNAL MEDICINE

## 2024-01-09 PROCEDURE — 80048 BASIC METABOLIC PNL TOTAL CA: CPT | Mod: HCNC | Performed by: INTERNAL MEDICINE

## 2024-01-10 ENCOUNTER — CLINICAL SUPPORT (OUTPATIENT)
Dept: CARDIOLOGY | Facility: HOSPITAL | Age: 79
End: 2024-01-10
Attending: INTERNAL MEDICINE
Payer: MEDICARE

## 2024-01-10 DIAGNOSIS — Z95.818 PRESENCE OF OTHER CARDIAC IMPLANTS AND GRAFTS: ICD-10-CM

## 2024-01-10 PROCEDURE — 93298 REM INTERROG DEV EVAL SCRMS: CPT | Mod: 26,HCNC,, | Performed by: INTERNAL MEDICINE

## 2024-01-11 LAB — CRC RECOMMENDATION EXT: NORMAL

## 2024-01-12 ENCOUNTER — TELEPHONE (OUTPATIENT)
Dept: CARDIOLOGY | Facility: CLINIC | Age: 79
End: 2024-01-12
Payer: MEDICARE

## 2024-01-12 ENCOUNTER — TELEPHONE (OUTPATIENT)
Dept: ELECTROPHYSIOLOGY | Facility: CLINIC | Age: 79
End: 2024-01-12
Payer: MEDICARE

## 2024-01-12 NOTE — TELEPHONE ENCOUNTER
Spoke with patient and advised that his insurance company would not approve the reimplant, only the removal. We will need to postpone the procedure scheduled for 1/16/2024 until we get the authorization. He verbalized understanding and will also call them himself.

## 2024-01-12 NOTE — TELEPHONE ENCOUNTER
Spoke with patient and advised that I spoke with the Appeals dept at [facility]. I had Dr Araya write up a phone encounter with rationale for ILR replacement. Faxed medical records and encounter note to [phone] (Expedited Appeals). Fax confirmation rec'd.

## 2024-01-12 NOTE — TELEPHONE ENCOUNTER
----- Message from Les Mendez MA sent at 1/12/2024 12:23 PM CST -----  Regarding: FW: Procedure  Good afternoon Tri  See below please advise.  Thanks    ----- Message -----  From: Brittnee Valencia  Sent: 1/12/2024  12:11 PM CST  To: Marshal Ramirez Staff  Subject: Procedure                                        Patient calling to speak with staff, please call back @ [phone]. Thank you Brittnee

## 2024-01-12 NOTE — TELEPHONE ENCOUNTER
Pt's insurance is denying approval of re-implantation of ILR, and we are requesting expedited appeal.  I feel strongly that replacement should occur. He has had very rare atrial fibrillation with low burden, and we are therefore not anticoagulating at this time. However, he is at risk for increasing AF, at which time anticoagulation will be recommended. Therefore, clinical decision making is going to be impacted without ILR.

## 2024-01-16 ENCOUNTER — TELEPHONE (OUTPATIENT)
Dept: ELECTROPHYSIOLOGY | Facility: CLINIC | Age: 79
End: 2024-01-16
Payer: MEDICARE

## 2024-01-16 NOTE — TELEPHONE ENCOUNTER
Spoke with Marleni Eldridge. She was able to access their portal and see that the case was approved. However, patient forgot to fast today and had breakfast. Rescheduled for 1/25/2024.

## 2024-01-16 NOTE — TELEPHONE ENCOUNTER
----- Message from Tamara Wakefield RN sent at 1/16/2024  8:47 AM CST -----  Ok, have you already reached out to the auth rep to clear the case? Im not sure they know to double check.     Tamara Mcintyre   ----- Message -----  From: Kiki Castano, RN  Sent: 1/16/2024   8:02 AM CST  To: Tamara Wakefield RN    He texted Dr Araya  over the weekend and said he rec'd a text saying the appeal was successfully overturned, so I told Dr Araya that I'd get him back on the schedule    ----- Message -----  From: Tamara Wakefield, SCOUT  Sent: 1/15/2024   8:52 PM CST  To: Kiki Castano, RN    [facility],     I saw this patient was put back on the schedule after 5 pm Friday. I just placed admit orders but still not authorized. Will this be rescheduled or is it a removal only?     ThanksTamara

## 2024-01-17 ENCOUNTER — PATIENT MESSAGE (OUTPATIENT)
Dept: ELECTROPHYSIOLOGY | Facility: CLINIC | Age: 79
End: 2024-01-17
Payer: MEDICARE

## 2024-01-17 ENCOUNTER — PATIENT OUTREACH (OUTPATIENT)
Dept: ADMINISTRATIVE | Facility: HOSPITAL | Age: 79
End: 2024-01-17
Payer: MEDICARE

## 2024-01-17 NOTE — PROGRESS NOTES

## 2024-01-24 ENCOUNTER — TELEPHONE (OUTPATIENT)
Dept: ELECTROPHYSIOLOGY | Facility: CLINIC | Age: 79
End: 2024-01-24
Payer: MEDICARE

## 2024-01-24 NOTE — TELEPHONE ENCOUNTER
Spoke to patient    CONFIRMED procedure arrival time of 9am for ILR removal/reimplant    Reiterated instructions including:  -Directions to check in desk  -NPO after midnight night prior to procedure  -Pre-procedure LABS reviewed, no alerts noted  -Confirmed no fever, cough, or shortness of breath in the past 30 days  -Bathe night prior and morning prior to procedure with Hibiclens solution or an antibacterial soap  -Reviewed current visitor policy    Patient verbalized understanding of above and appreciated the call.

## 2024-01-25 ENCOUNTER — HOSPITAL ENCOUNTER (OUTPATIENT)
Facility: HOSPITAL | Age: 79
Discharge: HOME | End: 2024-01-25
Attending: INTERNAL MEDICINE | Admitting: INTERNAL MEDICINE
Payer: MEDICARE

## 2024-01-25 VITALS
DIASTOLIC BLOOD PRESSURE: 58 MMHG | BODY MASS INDEX: 27.5 KG/M2 | HEIGHT: 72 IN | HEART RATE: 67 BPM | OXYGEN SATURATION: 96 % | WEIGHT: 203 LBS | SYSTOLIC BLOOD PRESSURE: 103 MMHG | RESPIRATION RATE: 20 BRPM | TEMPERATURE: 99 F

## 2024-01-25 DIAGNOSIS — R00.2 PALPITATIONS: ICD-10-CM

## 2024-01-25 DIAGNOSIS — Z95.9 CARDIAC DEVICE IN SITU: ICD-10-CM

## 2024-01-25 DIAGNOSIS — Z45.09 ENCOUNTER FOR LOOP RECORDER AT END OF BATTERY LIFE: Primary | ICD-10-CM

## 2024-01-25 PROCEDURE — 93005 ELECTROCARDIOGRAM TRACING: CPT | Mod: HCNC

## 2024-01-25 PROCEDURE — 25000003 PHARM REV CODE 250: Mod: HCNC | Performed by: INTERNAL MEDICINE

## 2024-01-25 PROCEDURE — 33285 INSJ SUBQ CAR RHYTHM MNTR: CPT | Mod: HCNC,,, | Performed by: INTERNAL MEDICINE

## 2024-01-25 PROCEDURE — 33286 RMVL SUBQ CAR RHYTHM MNTR: CPT | Mod: 59,51,HCNC, | Performed by: INTERNAL MEDICINE

## 2024-01-25 PROCEDURE — C1764 EVENT RECORDER, CARDIAC: HCPCS | Mod: HCNC | Performed by: INTERNAL MEDICINE

## 2024-01-25 PROCEDURE — 33285 INSJ SUBQ CAR RHYTHM MNTR: CPT | Mod: HCNC | Performed by: INTERNAL MEDICINE

## 2024-01-25 PROCEDURE — 33286 RMVL SUBQ CAR RHYTHM MNTR: CPT | Mod: 59,HCNC | Performed by: INTERNAL MEDICINE

## 2024-01-25 PROCEDURE — 93010 ELECTROCARDIOGRAM REPORT: CPT | Mod: HCNC,,, | Performed by: INTERNAL MEDICINE

## 2024-01-25 PROCEDURE — 63600175 PHARM REV CODE 636 W HCPCS: Mod: HCNC | Performed by: INTERNAL MEDICINE

## 2024-01-25 DEVICE — LUX-DX™ INSERTABLE CARDIAC MONITOR
Type: IMPLANTABLE DEVICE | Site: CHEST | Status: FUNCTIONAL
Brand: LUX-DX™ INSERTABLE CARDIAC MONITOR

## 2024-01-25 RX ORDER — VANCOMYCIN HYDROCHLORIDE 1 G/20ML
INJECTION, POWDER, LYOPHILIZED, FOR SOLUTION INTRAVENOUS
Status: DISCONTINUED | OUTPATIENT
Start: 2024-01-25 | End: 2024-01-25 | Stop reason: HOSPADM

## 2024-01-25 RX ORDER — SODIUM CHLORIDE 9 MG/ML
INJECTION, SOLUTION INTRAMUSCULAR; INTRAVENOUS; SUBCUTANEOUS
Status: DISCONTINUED | OUTPATIENT
Start: 2024-01-25 | End: 2024-01-25 | Stop reason: HOSPADM

## 2024-01-25 RX ORDER — LIDOCAINE HYDROCHLORIDE AND EPINEPHRINE 10; 10 MG/ML; UG/ML
INJECTION, SOLUTION INFILTRATION; PERINEURAL
Status: DISCONTINUED | OUTPATIENT
Start: 2024-01-25 | End: 2024-01-25 | Stop reason: HOSPADM

## 2024-01-25 RX ORDER — UBIDECARENONE 30 MG
100 CAPSULE ORAL DAILY
COMMUNITY

## 2024-01-25 RX ORDER — CEFAZOLIN SODIUM 1 G/3ML
INJECTION, POWDER, FOR SOLUTION INTRAMUSCULAR; INTRAVENOUS
Status: DISCONTINUED | OUTPATIENT
Start: 2024-01-25 | End: 2024-01-25 | Stop reason: HOSPADM

## 2024-01-25 NOTE — Clinical Note
A dressing was applied to the incision. Mepilex dressing applied 30 min following dermabond application by PACU RN

## 2024-01-25 NOTE — PLAN OF CARE
Patient admitted to sscu room 12 accompanied by spouse. Patient aao x4. Vss. PIV started. Pre procedure admission completed. All questions answered. Call light placed within reach.

## 2024-01-25 NOTE — PLAN OF CARE
Patient aao x4. No complaints. Mepilex dressing to mid chest intact. No bleeding noted. PIV removed. AVS printed. Home care instructions reviewed with  patient. All questions answered. Patient ambulatory with spouse for discharge.

## 2024-01-25 NOTE — HPI
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     History obtained through patient report and clinic notes:    Noted increasing palpitations.  ILR implanted 7/17/2020.  Bystolic initiated.  Feeling well overall since initiation of Bystolic. Walking more. Less palpitations.  ILR reveals no significant arrhythmias.     10/26/22 During last office visit, doing well. Exercising daily. Feeling well overall. No palpitations.  Has decreased Bystolic down to 10 mg daily.  ILR reveals no significant arrhythmias.    7/4/2023 new onset AF 3 hours and 26 minutes noted on ILR. Patient asymptomatic. Per Dr. Araya, continue to monitor.    11/28/23 ILR at RRT. Remove and replace to continue to monitor for AF as patient is not currently taking OAC.     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 Araya. He denies any chest pain, palpitations, SOB, VALLADARES, dizziness, light headedness, weakness, syncope, or near syncopal episodes. He denies any bleeding, infections, fevers, rashes, or surgeries in the past 30 days. He is currently not on OAC.    ECG today shows sinus bradycardia incomplete RBBB at 56 bpm  ms  ms QT/Qtc 450/434 ms.

## 2024-01-25 NOTE — DISCHARGE INSTRUCTIONS
"Medications:  -Continue all of your home medications. No medication changes.    New Medications:  None.    Diet  -You may resume oral intake after you are discharged, as long you have no swallowing difficulties.    Activity:  -As tolerated.    Other Precautions:  -Avoid getting the area wet for about 5 days. You may shower in 48 hours. Do not let beam of shower hit site directly and no scrubbing in area. Do not submerge incision site in water for 2 weeks.    -You can remove the outside bandage in 48 hours. You should not remove the purple skin glue that covers your wound. This will come off on its own.  -Every day, take your temperature and check your incision for signs of infection (redness, swelling, drainage, or warmth) for the next 7 days. It is normal to have some pain around the site and some bruising. However constant pain, severe tenderness and pus/drainage coming from your wound is not normal and you should call your physician immediately or seek attention at the ER. Fevers and chills and feeling ill is not normal and you should seek immediate medical attention.  -Learn to take your own pulse. Keep a record of your results. Ask your doctor what pulse rate means you should call for  medical attention.  -Carry an ID card that contains information about your loop recorder. You can show this card if your  loop recorder sets  off a metal detector. You should also show it to avoid screening with a hand-held security wand.  -Keep your cell phone away from your loop recorder. Don't carry the phone in your shirt pocket, even when it's turned off.  -Avoid strong electrical fields. Examples are those made by radio transmitting towers, "ham" radios, and heavy-duty  electrical equipment.  -Avoid leaning over the open orellana of a running car. A running engine creates an electrical field.    Follow-Up:  -Follow up for wound check in 1 week.  -Follow up with Dr. Araya in 3 months.  -Make regular follow-up appointments with " your doctor. He will check the loop recorder to make sure it's working  properly.    When to Call Your Doctor:  Call your doctor immediately if you have any of the following:  -Dizziness  -Chest pain  -Bleeding  -Weakness or numbness  -Visual, gait or speech disturbance  -Lack of energy  -Fainting spells  -Twitching chest muscles  -Rapid pulse or pounding heartbeat  -Shortness of breath  -Pain around your loop recorder  -Fever above 100.4°F or other signs of infection (redness, swelling, drainage, or warmth at the incision site)

## 2024-01-25 NOTE — Clinical Note
A generator pocket was opened at the left chest with blunt dissection, electrocautery and sharp dissection.

## 2024-01-25 NOTE — SUBJECTIVE & OBJECTIVE
Past Medical History:   Diagnosis Date    Hyperlipidemia     Hypertension     Insomnia     CRIS (obstructive sleep apnea)     CRIS on CPAP     Vitamin D deficiency        Past Surgical History:   Procedure Laterality Date    BASAL CELL CARCINOMA EXCISION      cataract surgery      Colorectal Surgery  11/07/2019    Anal Sphincterectomy    INSERTION OF IMPLANTABLE LOOP RECORDER N/A 7/17/2020    Procedure: Insertion, Implantable Loop Recorder;  Surgeon: James Araya MD;  Location: [facility] EP LAB;  Service: Cardiology;  Laterality: N/A;  ANNE, BOSSMAN, MDT, Local, SK, 3 Prep    TRANSFORAMINAL EPIDURAL INJECTION OF STEROID Right 8/26/2019    Procedure: INJECTION, STEROID, EPIDURAL, TRANSFORAMINAL APPROACH;  Surgeon: Maikel Millan MD;  Location: [facility] PAIN MGT;  Service: Pain Management;  Laterality: Right;  RT TF JOSE MARIA L4-5, LF-S1  *add Monday per Dr Millan       Review of patient's allergies indicates:  No Known Allergies    Current Facility-Administered Medications on File Prior to Encounter   Medication    0.9%  NaCl infusion     Current Outpatient Medications on File Prior to Encounter   Medication Sig    hydrOXYzine pamoate (VISTARIL) 50 MG Cap TAKE 1 CAPSULE BY MOUTH IN THE EVENING    aspirin 81 MG Chew Take 1 tablet (81 mg total) by mouth once daily.    cholecalciferol, vitamin D3, (VITAMIN D3) 50 mcg (2,000 unit) Tab Take 1 tablet by mouth once daily.     cyanocobalamin 2000 MCG tablet Take 5,000 mcg by mouth once daily.     gabapentin (NEURONTIN) 300 MG capsule Take 1 capsule (300 mg total) by mouth 3 (three) times daily as needed. (Patient taking differently: Take 300 mg by mouth 3 (three) times daily as needed. Once a day)    hydroCHLOROthiazide (HYDRODIURIL) 12.5 MG Tab Take 1 tablet (12.5 mg total) by mouth once daily.    losartan (COZAAR) 50 MG tablet Take 1 tablet (50 mg total) by mouth once daily.    nebivoloL (BYSTOLIC) 10 MG Tab Take 1 tablet by mouth once daily    rosuvastatin (CRESTOR) 10 MG tablet Take 1  tablet (10 mg total) by mouth once daily.    SAW PALMETTO ORAL Take 1 tablet by mouth once daily.     Family History       Problem Relation (Age of Onset)    Atrial fibrillation Brother    Cancer Sister, Brother    Heart attack Brother    Hypertension Brother    No Known Problems Brother, Son, Son, Son    Prostate cancer Brother          Tobacco Use    Smoking status: Former     Types: Cigarettes    Smokeless tobacco: Never    Tobacco comments:     Stopped about 40 years ago   Substance and Sexual Activity    Alcohol use: No     Comment: Rarely, only on special occasions    Drug use: No    Sexual activity: Yes     Partners: Female     Review of Systems   Constitutional: Negative for chills, fever and malaise/fatigue.   HENT:  Negative for congestion and nosebleeds.    Eyes:  Negative for blurred vision.   Cardiovascular:  Negative for chest pain, dyspnea on exertion, irregular heartbeat, leg swelling, near-syncope, orthopnea, palpitations, paroxysmal nocturnal dyspnea and syncope.   Respiratory:  Negative for cough, hemoptysis, shortness of breath, sleep disturbances due to breathing, sputum production and wheezing.    Endocrine: Negative for polyphagia.   Hematologic/Lymphatic: Negative for bleeding problem. Does not bruise/bleed easily.   Skin:  Negative for itching and rash.   Musculoskeletal:  Negative for back pain, joint swelling, muscle cramps and muscle weakness.   Gastrointestinal:  Negative for bloating, abdominal pain, hematemesis, hematochezia, nausea and vomiting.   Genitourinary:  Negative for dysuria and hematuria.   Neurological:  Negative for dizziness, focal weakness, headaches, light-headedness, loss of balance, numbness and weakness.   Psychiatric/Behavioral:  Negative for altered mental status.      Objective:     Vital Signs (Most Recent):    Vital Signs (24h Range):             There is no height or weight on file to calculate BMI.             Physical Exam  Vitals and nursing note reviewed.    Constitutional:       General: He is not in acute distress.     Appearance: Normal appearance. He is well-developed. He is not diaphoretic.   HENT:      Head: Normocephalic and atraumatic.      Mouth/Throat:      Mouth: Mucous membranes are moist.      Pharynx: No oropharyngeal exudate.   Eyes:      Conjunctiva/sclera: Conjunctivae normal.      Pupils: Pupils are equal, round, and reactive to light.   Cardiovascular:      Rate and Rhythm: Normal rate and regular rhythm. No extrasystoles are present.     Pulses: Normal pulses and intact distal pulses.           Radial pulses are 2+ on the right side and 2+ on the left side.        Dorsalis pedis pulses are 2+ on the right side and 2+ on the left side.      Heart sounds: Normal heart sounds, S1 normal and S2 normal. No murmur heard.     Comments: Device to LUCW. Incision and pocket in good condition.  Pulmonary:      Effort: Pulmonary effort is normal. No accessory muscle usage or respiratory distress.      Breath sounds: Normal breath sounds. No decreased breath sounds, wheezing, rhonchi or rales.   Chest:      Chest wall: No tenderness.   Abdominal:      General: Bowel sounds are normal. There is no distension.      Palpations: Abdomen is soft.      Tenderness: There is no abdominal tenderness. There is no guarding.   Musculoskeletal:         General: Normal range of motion.      Cervical back: Normal range of motion and neck supple.   Skin:     General: Skin is warm and dry.      Findings: No erythema or rash.   Neurological:      Mental Status: He is alert and oriented to person, place, and time. He is not disoriented.      Sensory: No sensory deficit.      Motor: No abnormal muscle tone.      Coordination: Coordination normal.      Gait: Gait normal.   Psychiatric:         Mood and Affect: Mood normal.         Behavior: Behavior normal.         Thought Content: Thought content normal.         Judgment: Judgment normal.            Significant Labs: Pre procedure  labs from 1/9/24 reviewed    Significant Imaging:  ECG

## 2024-01-25 NOTE — PLAN OF CARE
Pt received from EP lab via stretcher.Vs stable.No c/o discomfort.Incision with dermabond C&D.Wife at bedside and pt oriented to room.

## 2024-01-25 NOTE — H&P
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     History obtained through patient report and clinic notes:    Noted increasing palpitations.  ILR implanted 7/17/2020.  Bystolic initiated.  Feeling well overall since initiation of Bystolic. Walking more. Less palpitations.  ILR reveals no significant arrhythmias.     10/26/22 During last office visit, doing well. Exercising daily. Feeling well overall. No palpitations.  Has decreased Bystolic down to 10 mg daily.  ILR reveals no significant arrhythmias.    7/4/2023 new onset AF 3 hours and 26 minutes noted on ILR. Patient asymptomatic. Per Dr. Araya, continue to monitor.    11/28/23 ILR at RRT. Remove and replace to continue to monitor for AF as patient is not currently taking OAC.     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 Araya. He denies any chest pain, palpitations, SOB, VALLADARES, dizziness, light headedness, weakness, syncope, or near syncopal episodes. He denies any bleeding, infections, fevers, rashes, or surgeries in the past 30 days. He is currently not on OAC.    ECG today shows sinus bradycardia incomplete RBBB at 56 bpm  ms  ms QT/Qtc 450/434 ms.    Past Medical History:   Diagnosis Date    Hyperlipidemia     Hypertension     Insomnia     CRIS (obstructive sleep apnea)     CRIS on CPAP     Vitamin D deficiency      Past Surgical History:   Procedure Laterality Date    BASAL CELL CARCINOMA EXCISION      cataract surgery      Colorectal Surgery  11/07/2019    Anal  Sphincterectomy    INSERTION OF IMPLANTABLE LOOP RECORDER N/A 7/17/2020    Procedure: Insertion, Implantable Loop Recorder;  Surgeon: James Araya MD;  Location: [facility] EP LAB;  Service: Cardiology;  Laterality: N/A;  BOSSMAN RODRÍGUEZ MDT, Local, SK, 3 Prep    TRANSFORAMINAL EPIDURAL INJECTION OF STEROID Right 8/26/2019    Procedure: INJECTION, STEROID, EPIDURAL, TRANSFORAMINAL APPROACH;  Surgeon: Maikel Millan MD;  Location: [facility] PAIN MGT;  Service: Pain Management;  Laterality: Right;  RT TF JOSE MARIA L4-5, LF-S1  *add Monday per Dr Millan     Review of patient's allergies indicates:  No Known Allergies    Current Facility-Administered Medications on File Prior to Encounter   Medication    0.9%  NaCl infusion     Current Outpatient Medications on File Prior to Encounter   Medication Sig    hydrOXYzine pamoate (VISTARIL) 50 MG Cap TAKE 1 CAPSULE BY MOUTH IN THE EVENING    aspirin 81 MG Chew Take 1 tablet (81 mg total) by mouth once daily.    cholecalciferol, vitamin D3, (VITAMIN D3) 50 mcg (2,000 unit) Tab Take 1 tablet by mouth once daily.     cyanocobalamin 2000 MCG tablet Take 5,000 mcg by mouth once daily.     gabapentin (NEURONTIN) 300 MG capsule Take 1 capsule (300 mg total) by mouth 3 (three) times daily as needed. (Patient taking differently: Take 300 mg by mouth 3 (three) times daily as needed. Once a day)    hydroCHLOROthiazide (HYDRODIURIL) 12.5 MG Tab Take 1 tablet (12.5 mg total) by mouth once daily.    losartan (COZAAR) 50 MG tablet Take 1 tablet (50 mg total) by mouth once daily.    nebivoloL (BYSTOLIC) 10 MG Tab Take 1 tablet by mouth once daily    rosuvastatin (CRESTOR) 10 MG tablet Take 1 tablet (10 mg total) by mouth once daily.    SAW PALMETTO ORAL Take 1 tablet by mouth once daily.     Family History       Problem Relation (Age of Onset)    Atrial fibrillation Brother    Cancer Sister, Brother    Heart attack Brother    Hypertension Brother    No Known Problems Brother, Son, Son, Son    Prostate  cancer Brother     Tobacco Use    Smoking status: Former     Types: Cigarettes    Smokeless tobacco: Never    Tobacco comments:     Stopped about 40 years ago   Substance and Sexual Activity    Alcohol use: No     Comment: Rarely, only on special occasions    Drug use: No    Sexual activity: Yes     Partners: Female     Review of Systems   Constitutional: Negative for chills, fever and malaise/fatigue.   HENT:  Negative for congestion and nosebleeds.    Eyes:  Negative for blurred vision.   Cardiovascular:  Negative for chest pain, dyspnea on exertion, irregular heartbeat, leg swelling, near-syncope, orthopnea, palpitations, paroxysmal nocturnal dyspnea and syncope.   Respiratory:  Negative for cough, hemoptysis, shortness of breath, sleep disturbances due to breathing, sputum production and wheezing.    Endocrine: Negative for polyphagia.   Hematologic/Lymphatic: Negative for bleeding problem. Does not bruise/bleed easily.   Skin:  Negative for itching and rash.   Musculoskeletal:  Negative for back pain, joint swelling, muscle cramps and muscle weakness.   Gastrointestinal:  Negative for bloating, abdominal pain, hematemesis, hematochezia, nausea and vomiting.   Genitourinary:  Negative for dysuria and hematuria.   Neurological:  Negative for dizziness, focal weakness, headaches, light-headedness, loss of balance, numbness and weakness.   Psychiatric/Behavioral:  Negative for altered mental status.      Objective:     Vital Signs (Most Recent):     Vitals:    01/25/24 0944 01/25/24 0948   BP: 111/58 117/65   BP Location: Right arm Left arm   Patient Position: Lying Lying   Pulse: 56    Resp: 17    Temp: 99.3 °F (37.4 °C)    TempSrc: Temporal    SpO2: 96%    Weight: 92.1 kg (203 lb)    Height: 6' (1.829 m)     Vital Signs (24h Range):         Physical Exam  Vitals and nursing note reviewed.   Constitutional:       General: He is not in acute distress.     Appearance: Normal appearance. He is well-developed. He is  not diaphoretic.   HENT:      Head: Normocephalic and atraumatic.      Mouth/Throat:      Mouth: Mucous membranes are moist.      Pharynx: No oropharyngeal exudate.   Eyes:      Conjunctiva/sclera: Conjunctivae normal.      Pupils: Pupils are equal, round, and reactive to light.   Cardiovascular:      Rate and Rhythm: Normal rate and regular rhythm. No extrasystoles are present.     Pulses: Normal pulses and intact distal pulses.           Radial pulses are 2+ on the right side and 2+ on the left side.      Heart sounds: Normal heart sounds, S1 normal and S2 normal. No murmur heard.     Comments: Device to LUCW. Incision and pocket in good condition.  Pulmonary:      Effort: Pulmonary effort is normal. No accessory muscle usage or respiratory distress.      Breath sounds: Normal breath sounds. No decreased breath sounds, wheezing, rhonchi or rales.   Chest:      Chest wall: No tenderness.   Abdominal:      General: Bowel sounds are normal. There is no distension.      Palpations: Abdomen is soft.      Tenderness: There is no abdominal tenderness. There is no guarding.   Musculoskeletal:         General: Normal range of motion.      Cervical back: Normal range of motion and neck supple.   Skin:     General: Skin is warm and dry.      Findings: No erythema or rash.   Neurological:      Mental Status: He is alert and oriented to person, place, and time. He is not disoriented.      Sensory: No sensory deficit.      Motor: No abnormal muscle tone.      Coordination: Coordination normal.      Gait: Gait normal.   Psychiatric:         Mood and Affect: Mood normal.         Behavior: Behavior normal.         Thought Content: Thought content normal.         Judgment: Judgment normal.     Significant Labs: Pre procedure labs from 1/9/24 reviewed    Significant Imaging:  ECG today shows sinus bradycardia incomplete RBBB at 56 bpm  ms  ms QT/Qtc 450/434 ms.  Assessment and Plan:   Plan:  -ILR removal and  reimplant  -Local anesthetic    Discussed with the patient the risks, benefits, and alternatives of ILR implant. Our discussion of risks included (but was not limited to) the possibility of pain, infection, bleeding, rare risk of device erosion if left in, and death.   All questions were answered. Patient verbalized understanding and wishes to proceed. No further questions voiced at this time. Patient would like to proceed with just local anesthetic, and deferred conscious sedation. Consents signed.    Karin Fernandez NP  Cardiac Electrophysiology  [facility] - Arrhythmia    Attending: James Araya MD

## 2024-01-26 NOTE — DISCHARGE SUMMARY
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     History obtained through patient report and clinic notes:     Noted increasing palpitations.  ILR implanted 7/17/2020.  Bystolic initiated.  Feeling well overall since initiation of Bystolic. Walking more. Less palpitations.  ILR reveals no significant arrhythmias.     10/26/22 During last office visit, doing well. Exercising daily. Feeling well overall. No palpitations.  Has decreased Bystolic down to 10 mg daily.  ILR reveals no significant arrhythmias.     7/4/2023 new onset AF 3 hours and 26 minutes noted on ILR. Patient asymptomatic. Per Dr. Araya, continue to monitor.     11/28/23 ILR at [facility]RT. Remove and replace to continue to monitor for AF as patient is not currently taking OAC.      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 Araya. He denies any chest pain, palpitations, SOB, VALLADARES, dizziness, light headedness, weakness, syncope, or near syncopal episodes. He denies any bleeding, infections, fevers, rashes, or surgeries in the past 30 days. He is currently not on OAC.     ECG today shows sinus bradycardia incomplete RBBB at 56 bpm  ms  ms QT/Qtc 450/434 ms.    Procedure(s) (LRB):  REMOVAL, IMPLANTABLE LOOP RECORDER (N/A)  Insertion, Implantable Loop Recorder (N/A)     Indwelling Lines/Drains at time of discharge:  Lines/Drains/Airways    none    Hospital Course:Patient underwent ILR removal and ILR re-implant, tolerated procedure well  with no acute complications noted. Left chest site with Aquacel foam dressing, C/D/I with no bleeding, drainage, hematoma, or pain to site. VSS. Patient to follow up with device clinic in 1 week for wound check. Follow up in 3 months with Dr. Araya. Continue all home medications.  Discharge plans/instructions discussed with patient who verbalized understanding and agreement of plans of care. No further questions or concerns voiced at this time. Patient discharged home in stable condition.     Goals of Care Treatment Preferences: none    Consults: none    Significant Diagnostic Studies: N/A    Final Active Diagnoses:    Diagnosis Date Noted POA    PRINCIPAL PROBLEM:  Encounter for loop recorder at end of battery life [Z45.09] 01/25/2024 Not Applicable      Problems Resolved During this Admission:     Discharged Condition: stable    Disposition: Home or Self Care    Follow Up:   Follow-up Information       DEVICE CHECK CLINIC Follow up on 2/5/2024.    Why: Post ILR removal, Post ILR implant             James Araya MD Follow up in 3 month(s).    Specialties: Electrophysiology, Cardiology  Why: Post ILR removal, Post ILR implant  Contact information:  [address] [place] [id number]  [phone]                           Patient Instructions:      Notify your health care provider if you experience any of the following:  increased confusion or weakness     Notify your health care provider if you experience any of the following:  persistent dizziness, light-headedness, or visual disturbances     Notify your health care provider if you experience any of the following:  worsening rash     Notify your health care provider if you experience any of the following:  severe persistent headache     Notify your health care provider if you experience any of the following:  difficulty breathing or increased cough     Notify your health care provider if you experience any of the following:  redness, tenderness, or signs  of infection (pain, swelling, redness, odor or green/yellow discharge around incision site)     Notify your health care provider if you experience any of the following:  severe uncontrolled pain     Notify your health care provider if you experience any of the following:  persistent nausea and vomiting or diarrhea     Notify your health care provider if you experience any of the following:  temperature >100.4     Remove dressing in 24 hours     Activity as tolerated   Order Comments: Medications:  -Continue all of your home medications. No medication changes.    New Medications:  None.    Diet  -You may resume oral intake after you are discharged, as long you have no swallowing difficulties.    Activity:  -As tolerated.    Other Precautions:  -Avoid getting the area wet for about 5 days. You may shower in 48 hours. Do not let beam of shower hit site directly and no scrubbing in area. Do not submerge incision site in water for 2 weeks.    -You can remove the outside bandage in 48 hours. You should not remove the purple skin glue that covers your wound. This will come off on its own.  -Every day, take your temperature and check your incision for signs of infection (redness, swelling, drainage, or warmth) for the next 7 days. It is normal to have some pain around the site and some bruising. However constant pain, severe tenderness and pus/drainage coming from your wound is not normal and you should call your physician immediately or seek attention at the ER. Fevers and chills and feeling ill is not normal and you should seek immediate medical attention.  -Learn to take your own pulse. Keep a record of your results. Ask your doctor what pulse rate means you should call for  medical attention.  -Carry an ID card that contains information about your loop recorder. You can show this card if your  loop recorder sets  off a metal detector. You should also show it to avoid screening with a hand-held security wand.  -Keep your  "cell phone away from your loop recorder. Don't carry the phone in your shirt pocket, even when it's turned off.  -Avoid strong electrical fields. Examples are those made by radio transmitting towers, "ham" radios, and heavy-duty  electrical equipment.  -Avoid leaning over the open orellana of a running car. A running engine creates an electrical field.    Follow-Up:  -Follow up for wound check in 1 week.  -Follow up with Dr. Araya in 3 months.  -Make regular follow-up appointments with your doctor. He will check the loop recorder to make sure it's working  properly.    When to Call Your Doctor:  Call your doctor immediately if you have any of the following:  -Dizziness  -Chest pain  -Bleeding  -Weakness or numbness  -Visual, gait or speech disturbance  -Lack of energy  -Fainting spells  -Twitching chest muscles  -Rapid pulse or pounding heartbeat  -Shortness of breath  -Pain around your loop recorder  -Fever above 100.4°F or other signs of infection (redness, swelling, drainage, or warmth at the incision site)     Medications:  Reconciled Home Medications:      Medication List        CONTINUE taking these medications      aspirin 81 MG Chew  Take 1 tablet (81 mg total) by mouth once daily.     cholecalciferol (vitamin D3) 50 mcg (2,000 unit) Tab  Commonly known as: VITAMIN D3  Take 1 tablet by mouth once daily.     co-enzyme Q-10 30 mg capsule  Take 100 mg by mouth once daily.     cyanocobalamin 2000 MCG tablet  Take 5,000 mcg by mouth once daily.     gabapentin 300 MG capsule  Commonly known as: NEURONTIN  Take 1 capsule (300 mg total) by mouth 3 (three) times daily as needed.     hydroCHLOROthiazide 12.5 MG Tab  Commonly known as: HYDRODIURIL  Take 1 tablet (12.5 mg total) by mouth once daily.     hydrOXYzine pamoate 50 MG Cap  Commonly known as: VISTARIL  TAKE 1 CAPSULE BY MOUTH IN THE EVENING     losartan 50 MG tablet  Commonly known as: COZAAR  Take 1 tablet (50 mg total) by mouth once daily.     nebivoloL 10 MG " Tab  Commonly known as: BYSTOLIC  Take 1 tablet by mouth once daily     rosuvastatin 10 MG tablet  Commonly known as: CRESTOR  Take 1 tablet (10 mg total) by mouth once daily.     SAW PALMETTO ORAL  Take 1 tablet by mouth once daily.            Plan:  -Continue all home medications  -Follow up in 1 week with device in 1 week  -Follow up with Dr. Araya in 3 months.  Time spent on the discharge of patient: 10 minutes    Karin Fernandez NP  Cardiac Electrophysiology  [facility] -Arrhythmia    Attending: James Araya MD

## 2024-02-05 ENCOUNTER — CLINICAL SUPPORT (OUTPATIENT)
Dept: CARDIOLOGY | Facility: HOSPITAL | Age: 79
End: 2024-02-05
Attending: INTERNAL MEDICINE
Payer: MEDICARE

## 2024-02-05 DIAGNOSIS — Z95.818 PRESENCE OF OTHER CARDIAC IMPLANTS AND GRAFTS: ICD-10-CM

## 2024-02-05 DIAGNOSIS — Z45.09 ENCOUNTER FOR LOOP RECORDER AT END OF BATTERY LIFE: ICD-10-CM

## 2024-02-05 DIAGNOSIS — R00.2 PALPITATIONS: ICD-10-CM

## 2024-02-05 PROCEDURE — 93285 PRGRMG DEV EVAL SCRMS IP: CPT | Mod: 26,HCNC,, | Performed by: INTERNAL MEDICINE

## 2024-02-12 ENCOUNTER — PATIENT MESSAGE (OUTPATIENT)
Dept: SLEEP MEDICINE | Facility: CLINIC | Age: 79
End: 2024-02-12
Payer: MEDICARE

## 2024-02-12 ENCOUNTER — CLINICAL SUPPORT (OUTPATIENT)
Dept: CARDIOLOGY | Facility: HOSPITAL | Age: 79
End: 2024-02-12
Attending: INTERNAL MEDICINE
Payer: MEDICARE

## 2024-02-12 DIAGNOSIS — Z95.818 PRESENCE OF OTHER CARDIAC IMPLANTS AND GRAFTS: ICD-10-CM

## 2024-02-12 PROCEDURE — 93298 REM INTERROG DEV EVAL SCRMS: CPT | Mod: 26,HCNC,, | Performed by: INTERNAL MEDICINE

## 2024-02-23 LAB
OHS CV AF BURDEN PERCENT: < 1
OHS CV AF BURDEN PERCENT: < 1
OHS CV DC REMOTE DEVICE TYPE: NORMAL

## 2024-02-28 ENCOUNTER — CLINICAL SUPPORT (OUTPATIENT)
Dept: CARDIOLOGY | Facility: HOSPITAL | Age: 79
End: 2024-02-28
Payer: MEDICARE

## 2024-02-28 DIAGNOSIS — Z95.818 PRESENCE OF OTHER CARDIAC IMPLANTS AND GRAFTS: ICD-10-CM

## 2024-03-14 ENCOUNTER — CLINICAL SUPPORT (OUTPATIENT)
Dept: CARDIOLOGY | Facility: HOSPITAL | Age: 79
End: 2024-03-14
Attending: INTERNAL MEDICINE
Payer: MEDICARE

## 2024-03-14 DIAGNOSIS — Z95.818 PRESENCE OF OTHER CARDIAC IMPLANTS AND GRAFTS: ICD-10-CM

## 2024-03-14 PROCEDURE — 93298 REM INTERROG DEV EVAL SCRMS: CPT | Mod: 26,,, | Performed by: INTERNAL MEDICINE

## 2024-03-19 LAB
OHS CV AF BURDEN PERCENT: < 1
OHS CV DC REMOTE DEVICE TYPE: NORMAL

## 2024-03-28 RX ORDER — GABAPENTIN 300 MG/1
300 CAPSULE ORAL 3 TIMES DAILY PRN
Qty: 270 CAPSULE | Refills: 1 | Status: SHIPPED | OUTPATIENT
Start: 2024-03-28

## 2024-03-28 NOTE — TELEPHONE ENCOUNTER
LOV:4/4/23  LRF:3/29/22  RTC:4/16/24    Patient comment: For my Back pain I used prn Thanks No rush

## 2024-03-28 NOTE — TELEPHONE ENCOUNTER
Care Due:                  Date            Visit Type   Department     Provider  --------------------------------------------------------------------------------                                EP -                              PRIMARY      [facility] INTERNAL  Last Visit: 04-      [facility]   MEDICINE       Leandrolevar Carr                              EP -                              PRIMARY      [facility] INTERNAL  Next Visit: 04-      [facility]   MEDICINE       Leandro RICHELLE Carr                                                            Last  Test          Frequency    Reason                     Performed    Due Date  --------------------------------------------------------------------------------    CMP.........  12 months..  rosuvastatin.............  03- 03-    Lipid Panel.  12 months..  rosuvastatin.............  03- 03-    [employer] Embedded Care Due Messages. Reference number: [id number].   3/28/2024 1:48:29 PM CDT

## 2024-04-02 ENCOUNTER — LAB VISIT (OUTPATIENT)
Dept: LAB | Facility: HOSPITAL | Age: 79
End: 2024-04-02
Attending: INTERNAL MEDICINE
Payer: MEDICARE

## 2024-04-02 DIAGNOSIS — Z00.00 WELL ADULT EXAM: ICD-10-CM

## 2024-04-02 DIAGNOSIS — R79.9 ABNORMAL FINDING OF BLOOD CHEMISTRY, UNSPECIFIED: ICD-10-CM

## 2024-04-02 DIAGNOSIS — I10 ESSENTIAL HYPERTENSION: ICD-10-CM

## 2024-04-02 DIAGNOSIS — Z12.5 ENCOUNTER FOR SCREENING FOR MALIGNANT NEOPLASM OF PROSTATE: ICD-10-CM

## 2024-04-02 LAB
ALBUMIN SERPL BCP-MCNC: 4 G/DL (ref 3.5–5.2)
ALP SERPL-CCNC: 67 U/L (ref 55–135)
ALT SERPL W/O P-5'-P-CCNC: 24 U/L (ref 10–44)
ANION GAP SERPL CALC-SCNC: 8 MMOL/L (ref 8–16)
AST SERPL-CCNC: 32 U/L (ref 10–40)
BASOPHILS # BLD AUTO: 0.01 K/UL (ref 0–0.2)
BASOPHILS NFR BLD: 0.2 % (ref 0–1.9)
BILIRUB SERPL-MCNC: 0.4 MG/DL (ref 0.1–1)
BUN SERPL-MCNC: 14 MG/DL (ref 8–23)
CALCIUM SERPL-MCNC: 9.4 MG/DL (ref 8.7–10.5)
CHLORIDE SERPL-SCNC: 105 MMOL/L (ref 95–110)
CHOLEST SERPL-MCNC: 134 MG/DL (ref 120–199)
CHOLEST/HDLC SERPL: 4.3 {RATIO} (ref 2–5)
CO2 SERPL-SCNC: 28 MMOL/L (ref 23–29)
COMPLEXED PSA SERPL-MCNC: 2 NG/ML (ref 0–4)
CREAT SERPL-MCNC: 1.3 MG/DL (ref 0.5–1.4)
DIFFERENTIAL METHOD BLD: ABNORMAL
EOSINOPHIL # BLD AUTO: 0.1 K/UL (ref 0–0.5)
EOSINOPHIL NFR BLD: 1.1 % (ref 0–8)
ERYTHROCYTE [DISTWIDTH] IN BLOOD BY AUTOMATED COUNT: 13.2 % (ref 11.5–14.5)
EST. GFR  (NO RACE VARIABLE): 56.9 ML/MIN/1.73 M^2
ESTIMATED AVG GLUCOSE: 100 MG/DL (ref 68–131)
GLUCOSE SERPL-MCNC: 106 MG/DL (ref 70–110)
HBA1C MFR BLD: 5.1 % (ref 4–5.6)
HCT VFR BLD AUTO: 47.8 % (ref 40–54)
HDLC SERPL-MCNC: 31 MG/DL (ref 40–75)
HDLC SERPL: 23.1 % (ref 20–50)
HGB BLD-MCNC: 15.9 G/DL (ref 14–18)
IMM GRANULOCYTES # BLD AUTO: 0.02 K/UL (ref 0–0.04)
IMM GRANULOCYTES NFR BLD AUTO: 0.3 % (ref 0–0.5)
LDLC SERPL CALC-MCNC: 50.6 MG/DL (ref 63–159)
LYMPHOCYTES # BLD AUTO: 3.2 K/UL (ref 1–4.8)
LYMPHOCYTES NFR BLD: 48.1 % (ref 18–48)
MCH RBC QN AUTO: 31.9 PG (ref 27–31)
MCHC RBC AUTO-ENTMCNC: 33.3 G/DL (ref 32–36)
MCV RBC AUTO: 96 FL (ref 82–98)
MONOCYTES # BLD AUTO: 0.5 K/UL (ref 0.3–1)
MONOCYTES NFR BLD: 6.8 % (ref 4–15)
NEUTROPHILS # BLD AUTO: 2.9 K/UL (ref 1.8–7.7)
NEUTROPHILS NFR BLD: 43.5 % (ref 38–73)
NONHDLC SERPL-MCNC: 103 MG/DL
NRBC BLD-RTO: 0 /100 WBC
PLATELET # BLD AUTO: 181 K/UL (ref 150–450)
PMV BLD AUTO: 10.9 FL (ref 9.2–12.9)
POTASSIUM SERPL-SCNC: 5.2 MMOL/L (ref 3.5–5.1)
PROT SERPL-MCNC: 6.9 G/DL (ref 6–8.4)
RBC # BLD AUTO: 4.98 M/UL (ref 4.6–6.2)
SODIUM SERPL-SCNC: 141 MMOL/L (ref 136–145)
TRIGL SERPL-MCNC: 262 MG/DL (ref 30–150)
TSH SERPL DL<=0.005 MIU/L-ACNC: 2.07 UIU/ML (ref 0.4–4)
URATE SERPL-MCNC: 6.8 MG/DL (ref 3.4–7)
WBC # BLD AUTO: 6.57 K/UL (ref 3.9–12.7)

## 2024-04-02 PROCEDURE — 85025 COMPLETE CBC W/AUTO DIFF WBC: CPT | Mod: HCNC | Performed by: INTERNAL MEDICINE

## 2024-04-02 PROCEDURE — 84153 ASSAY OF PSA TOTAL: CPT | Mod: HCNC | Performed by: INTERNAL MEDICINE

## 2024-04-02 PROCEDURE — 80053 COMPREHEN METABOLIC PANEL: CPT | Mod: HCNC | Performed by: INTERNAL MEDICINE

## 2024-04-02 PROCEDURE — 84550 ASSAY OF BLOOD/URIC ACID: CPT | Mod: HCNC | Performed by: INTERNAL MEDICINE

## 2024-04-02 PROCEDURE — 83036 HEMOGLOBIN GLYCOSYLATED A1C: CPT | Mod: HCNC | Performed by: INTERNAL MEDICINE

## 2024-04-02 PROCEDURE — 84443 ASSAY THYROID STIM HORMONE: CPT | Mod: HCNC | Performed by: INTERNAL MEDICINE

## 2024-04-02 PROCEDURE — 80061 LIPID PANEL: CPT | Mod: HCNC | Performed by: INTERNAL MEDICINE

## 2024-04-02 PROCEDURE — 36415 COLL VENOUS BLD VENIPUNCTURE: CPT | Mod: HCNC,PO | Performed by: INTERNAL MEDICINE

## 2024-04-03 ENCOUNTER — PATIENT MESSAGE (OUTPATIENT)
Dept: INTERNAL MEDICINE | Facility: CLINIC | Age: 79
End: 2024-04-03
Payer: MEDICARE

## 2024-04-03 DIAGNOSIS — I25.84 CORONARY ARTERY CALCIFICATION: ICD-10-CM

## 2024-04-03 DIAGNOSIS — I25.10 CORONARY ARTERY CALCIFICATION: ICD-10-CM

## 2024-04-04 RX ORDER — NEBIVOLOL 10 MG/1
TABLET ORAL
Qty: 60 TABLET | Refills: 0 | Status: SHIPPED | OUTPATIENT
Start: 2024-04-04 | End: 2024-04-16 | Stop reason: SDUPTHER

## 2024-04-04 RX ORDER — ROSUVASTATIN CALCIUM 10 MG/1
10 TABLET, COATED ORAL DAILY
Qty: 90 TABLET | Refills: 3 | Status: SHIPPED | OUTPATIENT
Start: 2024-04-04 | End: 2024-12-30

## 2024-04-04 RX ORDER — LOSARTAN POTASSIUM 50 MG/1
50 TABLET ORAL DAILY
Qty: 90 TABLET | Refills: 3 | Status: SHIPPED | OUTPATIENT
Start: 2024-04-04

## 2024-04-04 RX ORDER — HYDROCHLOROTHIAZIDE 12.5 MG/1
12.5 TABLET ORAL DAILY
Qty: 90 TABLET | Refills: 3 | Status: SHIPPED | OUTPATIENT
Start: 2024-04-04

## 2024-04-04 NOTE — TELEPHONE ENCOUNTER
No care due was identified.  [facility] Embedded Care Due Messages. Reference number: [id number].   4/04/2024 8:27:30 AM CDT

## 2024-04-14 ENCOUNTER — CLINICAL SUPPORT (OUTPATIENT)
Dept: CARDIOLOGY | Facility: HOSPITAL | Age: 79
End: 2024-04-14
Attending: INTERNAL MEDICINE
Payer: MEDICARE

## 2024-04-14 DIAGNOSIS — Z95.818 PRESENCE OF OTHER CARDIAC IMPLANTS AND GRAFTS: ICD-10-CM

## 2024-04-14 PROCEDURE — 93298 REM INTERROG DEV EVAL SCRMS: CPT | Mod: 26,HCNC,, | Performed by: INTERNAL MEDICINE

## 2024-04-16 ENCOUNTER — OFFICE VISIT (OUTPATIENT)
Dept: INTERNAL MEDICINE | Facility: CLINIC | Age: 79
End: 2024-04-16
Payer: MEDICARE

## 2024-04-16 VITALS
TEMPERATURE: 98 F | HEIGHT: 72 IN | BODY MASS INDEX: 27.36 KG/M2 | SYSTOLIC BLOOD PRESSURE: 108 MMHG | HEART RATE: 64 BPM | WEIGHT: 202 LBS | DIASTOLIC BLOOD PRESSURE: 60 MMHG | RESPIRATION RATE: 16 BRPM

## 2024-04-16 DIAGNOSIS — E78.49 OTHER HYPERLIPIDEMIA: ICD-10-CM

## 2024-04-16 DIAGNOSIS — I10 ESSENTIAL HYPERTENSION: Primary | ICD-10-CM

## 2024-04-16 DIAGNOSIS — M85.80 OSTEOPENIA, UNSPECIFIED LOCATION: ICD-10-CM

## 2024-04-16 DIAGNOSIS — G47.33 OSA ON CPAP: ICD-10-CM

## 2024-04-16 DIAGNOSIS — M85.89 OTHER SPECIFIED DISORDERS OF BONE DENSITY AND STRUCTURE, MULTIPLE SITES: ICD-10-CM

## 2024-04-16 PROCEDURE — 3074F SYST BP LT 130 MM HG: CPT | Mod: CPTII,S$GLB,, | Performed by: INTERNAL MEDICINE

## 2024-04-16 PROCEDURE — 1101F PT FALLS ASSESS-DOCD LE1/YR: CPT | Mod: CPTII,S$GLB,, | Performed by: INTERNAL MEDICINE

## 2024-04-16 PROCEDURE — 1160F RVW MEDS BY RX/DR IN RCRD: CPT | Mod: CPTII,S$GLB,, | Performed by: INTERNAL MEDICINE

## 2024-04-16 PROCEDURE — 99999 PR PBB SHADOW E&M-EST. PATIENT-LVL IV: CPT | Mod: PBBFAC,,, | Performed by: INTERNAL MEDICINE

## 2024-04-16 PROCEDURE — 99214 OFFICE O/P EST MOD 30 MIN: CPT | Mod: S$GLB,,, | Performed by: INTERNAL MEDICINE

## 2024-04-16 PROCEDURE — 3288F FALL RISK ASSESSMENT DOCD: CPT | Mod: CPTII,S$GLB,, | Performed by: INTERNAL MEDICINE

## 2024-04-16 PROCEDURE — 3078F DIAST BP <80 MM HG: CPT | Mod: CPTII,S$GLB,, | Performed by: INTERNAL MEDICINE

## 2024-04-16 PROCEDURE — 1159F MED LIST DOCD IN RCRD: CPT | Mod: CPTII,S$GLB,, | Performed by: INTERNAL MEDICINE

## 2024-04-16 PROCEDURE — 1126F AMNT PAIN NOTED NONE PRSNT: CPT | Mod: CPTII,S$GLB,, | Performed by: INTERNAL MEDICINE

## 2024-04-16 RX ORDER — NEBIVOLOL 10 MG/1
TABLET ORAL
Qty: 90 TABLET | Refills: 3 | Status: SHIPPED | OUTPATIENT
Start: 2024-04-16

## 2024-04-17 ENCOUNTER — HOSPITAL ENCOUNTER (OUTPATIENT)
Dept: RADIOLOGY | Facility: HOSPITAL | Age: 79
Discharge: HOME | End: 2024-04-17
Attending: INTERNAL MEDICINE
Payer: MEDICARE

## 2024-04-17 DIAGNOSIS — M85.89 OTHER SPECIFIED DISORDERS OF BONE DENSITY AND STRUCTURE, MULTIPLE SITES: ICD-10-CM

## 2024-04-17 DIAGNOSIS — M85.80 OSTEOPENIA, UNSPECIFIED LOCATION: ICD-10-CM

## 2024-04-17 PROCEDURE — 77080 DXA BONE DENSITY AXIAL: CPT | Mod: 26,HCNC,, | Performed by: RADIOLOGY

## 2024-04-17 PROCEDURE — 77080 DXA BONE DENSITY AXIAL: CPT | Mod: TC,HCNC

## 2024-04-18 LAB
OHS CV AF BURDEN PERCENT: < 1
OHS CV DC REMOTE DEVICE TYPE: NORMAL

## 2024-04-20 ENCOUNTER — PATIENT MESSAGE (OUTPATIENT)
Dept: INTERNAL MEDICINE | Facility: CLINIC | Age: 79
End: 2024-04-20
Payer: MEDICARE

## 2024-04-20 DIAGNOSIS — M54.50 CHRONIC LOW BACK PAIN, UNSPECIFIED BACK PAIN LATERALITY, UNSPECIFIED WHETHER SCIATICA PRESENT: ICD-10-CM

## 2024-04-20 DIAGNOSIS — G89.29 CHRONIC LOW BACK PAIN, UNSPECIFIED BACK PAIN LATERALITY, UNSPECIFIED WHETHER SCIATICA PRESENT: ICD-10-CM

## 2024-04-20 DIAGNOSIS — R93.7 ABNORMAL BONE DENSITY SCREENING: ICD-10-CM

## 2024-04-20 DIAGNOSIS — M85.80 OSTEOPENIA, UNSPECIFIED LOCATION: Primary | ICD-10-CM

## 2024-04-22 ENCOUNTER — HOSPITAL ENCOUNTER (OUTPATIENT)
Dept: RADIOLOGY | Facility: HOSPITAL | Age: 79
Discharge: HOME | End: 2024-04-22
Attending: INTERNAL MEDICINE
Payer: MEDICARE

## 2024-04-22 DIAGNOSIS — R93.7 ABNORMAL BONE DENSITY SCREENING: ICD-10-CM

## 2024-04-22 DIAGNOSIS — M54.50 CHRONIC LOW BACK PAIN, UNSPECIFIED BACK PAIN LATERALITY, UNSPECIFIED WHETHER SCIATICA PRESENT: ICD-10-CM

## 2024-04-22 DIAGNOSIS — M85.80 OSTEOPENIA, UNSPECIFIED LOCATION: ICD-10-CM

## 2024-04-22 DIAGNOSIS — G89.29 CHRONIC LOW BACK PAIN, UNSPECIFIED BACK PAIN LATERALITY, UNSPECIFIED WHETHER SCIATICA PRESENT: ICD-10-CM

## 2024-04-22 PROCEDURE — 72100 X-RAY EXAM L-S SPINE 2/3 VWS: CPT | Mod: TC,HCNC,FY

## 2024-04-22 PROCEDURE — 72100 X-RAY EXAM L-S SPINE 2/3 VWS: CPT | Mod: 26,HCNC,, | Performed by: RADIOLOGY

## 2024-04-22 NOTE — TELEPHONE ENCOUNTER
Lumbar spine x-rays will be ordered for further evaluation of lower back pain in view of bone density changes.

## 2024-05-02 ENCOUNTER — PATIENT MESSAGE (OUTPATIENT)
Dept: INTERNAL MEDICINE | Facility: CLINIC | Age: 79
End: 2024-05-02
Payer: MEDICARE

## 2024-05-02 DIAGNOSIS — G89.29 CHRONIC LOW BACK PAIN, UNSPECIFIED BACK PAIN LATERALITY, UNSPECIFIED WHETHER SCIATICA PRESENT: Primary | ICD-10-CM

## 2024-05-02 DIAGNOSIS — M54.50 CHRONIC LOW BACK PAIN, UNSPECIFIED BACK PAIN LATERALITY, UNSPECIFIED WHETHER SCIATICA PRESENT: Primary | ICD-10-CM

## 2024-05-03 NOTE — TELEPHONE ENCOUNTER
Phone [phone],    [facility].  Fax    Pt  obdulio díaz    I have pended referral for external pt and will fax once  You sign.    Ok?     Thanks rafaela

## 2024-05-07 ENCOUNTER — TELEPHONE (OUTPATIENT)
Dept: INTERNAL MEDICINE | Facility: CLINIC | Age: 79
End: 2024-05-07
Payer: MEDICARE

## 2024-05-07 NOTE — TELEPHONE ENCOUNTER
----- Message from Trang Wright MA sent at 5/7/2024  9:56 AM CDT -----    ----- Message -----  From: Alisha Mauricio  Sent: 5/7/2024   9:34 AM CDT  To: [facility] Staff    Type:  Call    Who Called:pt  Does the patient know what this is regarding?:Physical Therapy  Would the patient rather a call back or a response via [facility]? call  Best Call Back Number: [phone]  Additional Information:

## 2024-05-07 NOTE — TELEPHONE ENCOUNTER
I called him , pt arjun says they didn't get fax.    I had sent it Friday 5/3.    I told him I will refax.  I called arjun and confirmed fax number   Is correct and refaxed referral.   No

## 2024-05-15 ENCOUNTER — CLINICAL SUPPORT (OUTPATIENT)
Dept: CARDIOLOGY | Facility: HOSPITAL | Age: 79
End: 2024-05-15
Attending: INTERNAL MEDICINE
Payer: MEDICARE

## 2024-05-15 DIAGNOSIS — Z95.818 PRESENCE OF OTHER CARDIAC IMPLANTS AND GRAFTS: ICD-10-CM

## 2024-05-15 PROCEDURE — 93298 REM INTERROG DEV EVAL SCRMS: CPT | Mod: 26,HCNC,, | Performed by: INTERNAL MEDICINE

## 2024-05-15 PROCEDURE — 93298 REM INTERROG DEV EVAL SCRMS: CPT | Mod: HCNC | Performed by: INTERNAL MEDICINE

## 2024-05-22 LAB
OHS CV AF BURDEN PERCENT: < 1
OHS CV DC REMOTE DEVICE TYPE: NORMAL

## 2024-06-19 NOTE — PROGRESS NOTES
Subjective:       Patient ID: Maegan Cleary is a 78 y.o. male.    Chief Complaint: Annual Exam (With labs to revu. ), Follow-up, Hypertension, Hyperlipidemia, and Back Pain    HPI  The patient presents for annual examination and follow-up of medical conditions which include hypertension, hyperlipidemia, chronic lower back pain, obstructive sleep apnea, and osteopenia.  The patient relates he generally feels well.  He remains physically active.  He exercises 4-5 days a week.  He has chronic lower back pain.  He has been using gabapentin 3 times daily.  He also notes right lumbar radicular symptoms with the back pain.  He has been using topical Voltaren gel to the back with good results.  He has not been using any oral NSAIDs however.    Patient has osteopenia.  He is currently using calcium vitamin-D, and B12 supplements.  He has obstructive sleep apnea and uses CPAP therapy every night with good results.  No daytime sleepiness is noted.    He has hypertension.  He reports blood pressures have been fairly well controlled.  No medication side effects have been noted with use of Bystolic, losartan, and hydrochlorothiazide.    Immunization record was reviewed.      Screening tests were reviewed.  Bone density study will be obtained.    No interval change in past medical history, family history, or social history since prior evaluations.    Review of Systems   Constitutional:  Negative for activity change, appetite change, chills, fatigue, fever and unexpected weight change.   HENT:  Negative for nasal congestion, ear pain, nosebleeds and postnasal drip.    Eyes:  Negative for pain, redness, itching and visual disturbance.   Respiratory:  Negative for cough, chest tightness, shortness of breath and wheezing.    Cardiovascular:  Negative for chest pain, palpitations and leg swelling.   Gastrointestinal:  Negative for abdominal pain, blood in stool, constipation, nausea and vomiting.   Genitourinary:  Negative for  difficulty urinating, dysuria, frequency, hematuria and urgency.   Musculoskeletal:  Positive for back pain and leg pain. Negative for arthralgias, gait problem, joint swelling, myalgias, neck pain and neck stiffness.   Integumentary:  Negative for color change and rash.   Neurological:  Negative for dizziness, seizures, syncope, weakness, light-headedness, numbness and headaches.   Hematological:  Does not bruise/bleed easily.   Psychiatric/Behavioral:  Negative for agitation, confusion, hallucinations and sleep disturbance. The patient is not nervous/anxious.             Physical Exam  Vitals and nursing note reviewed.   Constitutional:       General: He is not in acute distress.     Appearance: Normal appearance. He is well-developed.   HENT:      Head: Normocephalic and atraumatic.      Right Ear: External ear normal.      Left Ear: External ear normal.      Mouth/Throat:      Pharynx: Oropharynx is clear. No oropharyngeal exudate.   Eyes:      General: No scleral icterus.     Extraocular Movements: Extraocular movements intact.      Conjunctiva/sclera: Conjunctivae normal.   Neck:      Thyroid: No thyromegaly.      Vascular: No JVD.   Cardiovascular:      Rate and Rhythm: Normal rate and regular rhythm.      Pulses: Normal pulses.      Heart sounds: Normal heart sounds. No murmur heard.     No friction rub. No gallop.   Pulmonary:      Effort: Pulmonary effort is normal. No respiratory distress.      Breath sounds: Normal breath sounds. No wheezing or rales.   Abdominal:      General: Bowel sounds are normal. There is no distension.      Palpations: Abdomen is soft. There is no mass.      Tenderness: There is no abdominal tenderness. There is no guarding.   Musculoskeletal:         General: No tenderness. Normal range of motion.      Cervical back: Normal range of motion and neck supple.      Right lower leg: No edema.      Left lower leg: No edema.      Comments: Back:  Negative straight leg raising test  bilaterally.  No vertebral percussion tenderness.  No lumbar paraspinous muscle spasm.    Hips:  Negative RAD test bilaterally.   Lymphadenopathy:      Cervical: No cervical adenopathy.   Skin:     General: Skin is warm and dry.      Findings: No rash.   Neurological:      General: No focal deficit present.      Mental Status: He is alert and oriented to person, place, and time.      Cranial Nerves: No cranial nerve deficit.      Motor: No abnormal muscle tone.      Deep Tendon Reflexes: Reflexes are normal and symmetric.   Psychiatric:         Behavior: Behavior normal.         Thought Content: Thought content normal.           Lab Visit on 04/02/2024   Component Date Value Ref Range Status    Sodium 04/02/2024 141  136 - 145 mmol/L Final    Potassium 04/02/2024 5.2 (H)  3.5 - 5.1 mmol/L Final    Chloride 04/02/2024 105  95 - 110 mmol/L Final    CO2 04/02/2024 28  23 - 29 mmol/L Final    Glucose 04/02/2024 106  70 - 110 mg/dL Final    BUN 04/02/2024 14  8 - 23 mg/dL Final    Creatinine 04/02/2024 1.3  0.5 - 1.4 mg/dL Final    Calcium 04/02/2024 9.4  8.7 - 10.5 mg/dL Final    Total Protein 04/02/2024 6.9  6.0 - 8.4 g/dL Final    Albumin 04/02/2024 4.0  3.5 - 5.2 g/dL Final    Total Bilirubin 04/02/2024 0.4  0.1 - 1.0 mg/dL Final    Comment: For infants and newborns, interpretation of results should be based  on gestational age, weight and in agreement with clinical  observations.    Premature Infant recommended reference ranges:  Up to 24 hours.............<8.0 mg/dL  Up to 48 hours............<12.0 mg/dL  3-5 days..................<15.0 mg/dL  6-29 days.................<15.0 mg/dL      Alkaline Phosphatase 04/02/2024 67  55 - 135 U/L Final    AST 04/02/2024 32  10 - 40 U/L Final    ALT 04/02/2024 24  10 - 44 U/L Final    eGFR 04/02/2024 56.9 (A)  >60 mL/min/1.73 m^2 Final    Anion Gap 04/02/2024 8  8 - 16 mmol/L Final    Cholesterol 04/02/2024 134  120 - 199 mg/dL Final    Comment: The National Cholesterol  Education Program (NCEP) has set the  following guidelines (reference ranges) for Cholesterol:  Optimal.....................<200 mg/dL  Borderline High.............200-239 mg/dL  High........................> or = 240 mg/dL      Triglycerides 04/02/2024 262 (H)  30 - 150 mg/dL Final    Comment: The National Cholesterol Education Program (NCEP) has set the  following guidelines (reference values) for triglycerides:  Normal......................<150 mg/dL  Borderline High.............150-199 mg/dL  High........................200-499 mg/dL      HDL 04/02/2024 31 (L)  40 - 75 mg/dL Final    Comment: The National Cholesterol Education Program (NCEP) has set the  following guidelines (reference values) for HDL Cholesterol:  Low...............<40 mg/dL  Optimal...........>60 mg/dL      LDL Cholesterol 04/02/2024 50.6 (L)  63.0 - 159.0 mg/dL Final    Comment: The National Cholesterol Education Program (NCEP) has set the  following guidelines (reference values) for LDL Cholesterol:  Optimal.......................<130 mg/dL  Borderline High...............130-159 mg/dL  High..........................160-189 mg/dL  Very High.....................>190 mg/dL      HDL/Cholesterol Ratio 04/02/2024 23.1  20.0 - 50.0 % Final    Total Cholesterol/HDL Ratio 04/02/2024 4.3  2.0 - 5.0 Final    Non-HDL Cholesterol 04/02/2024 103  mg/dL Final    Comment: Risk category and Non-HDL cholesterol goals:  Coronary heart disease (CHD)or equivalent (10-year risk of CHD >20%):  Non-HDL cholesterol goal     <130 mg/dL  Two or more CHD risk factors and 10-year risk of CHD <= 20%:  Non-HDL cholesterol goal     <160 mg/dL  0 to 1 CHD risk factor:  Non-HDL cholesterol goal     <190 mg/dL      WBC 04/02/2024 6.57  3.90 - 12.70 K/uL Final    RBC 04/02/2024 4.98  4.60 - 6.20 M/uL Final    Hemoglobin 04/02/2024 15.9  14.0 - 18.0 g/dL Final    Hematocrit 04/02/2024 47.8  40.0 - 54.0 % Final    MCV 04/02/2024 96  82 - 98 fL Final    MCH 04/02/2024 31.9 (H)   27.0 - 31.0 pg Final    MCHC 04/02/2024 33.3  32.0 - 36.0 g/dL Final    RDW 04/02/2024 13.2  11.5 - 14.5 % Final    Platelets 04/02/2024 181  150 - 450 K/uL Final    MPV 04/02/2024 10.9  9.2 - 12.9 fL Final    Immature Granulocytes 04/02/2024 0.3  0.0 - 0.5 % Final    Gran # (ANC) 04/02/2024 2.9  1.8 - 7.7 K/uL Final    Immature Grans (Abs) 04/02/2024 0.02  0.00 - 0.04 K/uL Final    Comment: Mild elevation in immature granulocytes is non specific and   can be seen in a variety of conditions including stress response,   acute inflammation, trauma and pregnancy. Correlation with other   laboratory and clinical findings is essential.      Lymph # 04/02/2024 3.2  1.0 - 4.8 K/uL Final    Mono # 04/02/2024 0.5  0.3 - 1.0 K/uL Final    Eos # 04/02/2024 0.1  0.0 - 0.5 K/uL Final    Baso # 04/02/2024 0.01  0.00 - 0.20 K/uL Final    nRBC 04/02/2024 0  0 /100 WBC Final    Gran % 04/02/2024 43.5  38.0 - 73.0 % Final    Lymph % 04/02/2024 48.1 (H)  18.0 - 48.0 % Final    Mono % 04/02/2024 6.8  4.0 - 15.0 % Final    Eosinophil % 04/02/2024 1.1  0.0 - 8.0 % Final    Basophil % 04/02/2024 0.2  0.0 - 1.9 % Final    Differential Method 04/02/2024 Automated   Final    TSH 04/02/2024 2.068  0.400 - 4.000 uIU/mL Final    PSA, Screen 04/02/2024 2.0  0.00 - 4.00 ng/mL Final    Comment: The testing method is a chemiluminescent microparticle immunoassay   manufactured by [employer] and performed on the Keemotion   or   GLO system. Values obtained with different assay manufacturers   for   methods may be different and cannot be used interchangeably.  PSA Expected levels:  Hormonal Therapy: <0.05 ng/ml  Prostatectomy: <0.01 ng/ml  Radiation Therapy: <1.00 ng/ml      Hemoglobin A1C 04/02/2024 5.1  4.0 - 5.6 % Final    Comment: ADA Screening Guidelines:  5.7-6.4%  Consistent with prediabetes  >or=6.5%  Consistent with diabetes    High levels of fetal hemoglobin interfere with the HbA1C  assay. Heterozygous hemoglobin variants  (HbS, HgC, etc)do  not significantly interfere with this assay.   However, presence of multiple variants may affect accuracy.      Estimated Avg Glucose 04/02/2024 100  68 - 131 mg/dL Final    Uric Acid 04/02/2024 6.8  3.4 - 7.0 mg/dL Final   Patient Outreach on 01/17/2024   Component Date Value Ref Range Status    CRC Recommendation External 01/11/2024 Repeat colonoscopy in 3 years   Final       Assessment & Plan:      Maegan was seen today for annual exam, follow-up, hypertension, hyperlipidemia and back pain.    Diagnoses and all orders for this visit:    Essential hypertension - current blood pressure medications will be continued as prescribed.    CRIS on CPAP - CPAP therapy will be continued.    Other hyperlipidemia - rosuvastatin will be continued as prescribed.    Osteopenia, unspecified location  -     DXA Bone Density Axial Skeleton 1 or more sites; Future    Other specified disorders of bone density and structure, multiple sites  -     DXA Bone Density Axial Skeleton 1 or more sites; Future    Other orders  -     nebivoloL (BYSTOLIC) 10 MG Tab; Take 1 tablet by mouth once daily         Follow up in about 1 year (around 4/16/2025).     Leandro Carr MD

## 2024-06-24 ENCOUNTER — CLINICAL SUPPORT (OUTPATIENT)
Dept: CARDIOLOGY | Facility: HOSPITAL | Age: 79
End: 2024-06-24
Payer: MEDICARE

## 2024-06-24 ENCOUNTER — CLINICAL SUPPORT (OUTPATIENT)
Dept: CARDIOLOGY | Facility: HOSPITAL | Age: 79
End: 2024-06-24
Attending: INTERNAL MEDICINE
Payer: MEDICARE

## 2024-06-24 DIAGNOSIS — I49.8 OTHER SPECIFIED CARDIAC ARRHYTHMIAS: ICD-10-CM

## 2024-06-24 PROCEDURE — 93298 REM INTERROG DEV EVAL SCRMS: CPT | Performed by: INTERNAL MEDICINE

## 2024-06-24 PROCEDURE — 93298 REM INTERROG DEV EVAL SCRMS: CPT | Mod: 26,,, | Performed by: INTERNAL MEDICINE

## 2024-06-26 LAB
OHS CV AF BURDEN PERCENT: < 1
OHS CV DC REMOTE DEVICE TYPE: NORMAL

## 2024-07-05 ENCOUNTER — TELEPHONE (OUTPATIENT)
Dept: CARDIOLOGY | Facility: HOSPITAL | Age: 79
End: 2024-07-05
Payer: MEDICARE

## 2024-07-05 NOTE — TELEPHONE ENCOUNTER
ILR remote transmission received today for AF episode lasting 8 minutes, avg 103 bpm.      New onset atrial fibrillation noted during device interrogation/device remote check:    Overall burden:  <1%    Max duration seen: AF x  1 lasting 8 minutes    Most recent episode: 7/3/2024    Ventricular rates:   Controlled        Anticoagulation status:  None    Bp 108/60    Patient symptoms: asymptomatic, patient was at the gym working out.

## 2024-07-31 ENCOUNTER — CLINICAL SUPPORT (OUTPATIENT)
Dept: CARDIOLOGY | Facility: HOSPITAL | Age: 79
End: 2024-07-31
Payer: MEDICARE

## 2024-07-31 ENCOUNTER — CLINICAL SUPPORT (OUTPATIENT)
Dept: CARDIOLOGY | Facility: HOSPITAL | Age: 79
End: 2024-07-31
Attending: INTERNAL MEDICINE
Payer: MEDICARE

## 2024-07-31 DIAGNOSIS — I49.8 OTHER SPECIFIED CARDIAC ARRHYTHMIAS: ICD-10-CM

## 2024-07-31 PROCEDURE — 93298 REM INTERROG DEV EVAL SCRMS: CPT | Mod: 26,,, | Performed by: INTERNAL MEDICINE

## 2024-07-31 PROCEDURE — 93298 REM INTERROG DEV EVAL SCRMS: CPT | Performed by: INTERNAL MEDICINE

## 2024-08-09 LAB
OHS CV AF BURDEN PERCENT: < 1
OHS CV DC REMOTE DEVICE TYPE: NORMAL

## 2024-09-04 ENCOUNTER — CLINICAL SUPPORT (OUTPATIENT)
Dept: CARDIOLOGY | Facility: HOSPITAL | Age: 79
End: 2024-09-04
Attending: INTERNAL MEDICINE
Payer: MEDICARE

## 2024-09-04 ENCOUNTER — CLINICAL SUPPORT (OUTPATIENT)
Dept: CARDIOLOGY | Facility: HOSPITAL | Age: 79
End: 2024-09-04
Payer: MEDICARE

## 2024-09-04 DIAGNOSIS — I49.8 OTHER SPECIFIED CARDIAC ARRHYTHMIAS: ICD-10-CM

## 2024-09-04 PROCEDURE — 93298 REM INTERROG DEV EVAL SCRMS: CPT | Mod: 26,,, | Performed by: INTERNAL MEDICINE

## 2024-09-04 PROCEDURE — 93298 REM INTERROG DEV EVAL SCRMS: CPT | Performed by: INTERNAL MEDICINE

## 2024-09-16 LAB
OHS CV AF BURDEN PERCENT: < 1
OHS CV DC REMOTE DEVICE TYPE: NORMAL

## 2024-10-03 ENCOUNTER — OFFICE VISIT (OUTPATIENT)
Dept: FAMILY MEDICINE | Facility: CLINIC | Age: 79
End: 2024-10-03
Payer: MEDICARE

## 2024-10-03 VITALS
SYSTOLIC BLOOD PRESSURE: 116 MMHG | HEART RATE: 64 BPM | WEIGHT: 204.13 LBS | DIASTOLIC BLOOD PRESSURE: 60 MMHG | BODY MASS INDEX: 27.69 KG/M2 | OXYGEN SATURATION: 97 %

## 2024-10-03 DIAGNOSIS — Z00.00 ENCOUNTER FOR PREVENTIVE HEALTH EXAMINATION: Primary | ICD-10-CM

## 2024-10-03 DIAGNOSIS — I10 ESSENTIAL HYPERTENSION: ICD-10-CM

## 2024-10-03 DIAGNOSIS — E78.49 OTHER HYPERLIPIDEMIA: ICD-10-CM

## 2024-10-03 DIAGNOSIS — E66.3 OVERWEIGHT (BMI 25.0-29.9): ICD-10-CM

## 2024-10-03 DIAGNOSIS — M54.50 ACUTE MIDLINE LOW BACK PAIN WITHOUT SCIATICA: ICD-10-CM

## 2024-10-03 PROCEDURE — 99999 PR PBB SHADOW E&M-EST. PATIENT-LVL IV: CPT | Mod: PBBFAC,HCNC,,

## 2024-10-03 NOTE — PATIENT INSTRUCTIONS
Counseling and Referral of Other Preventative  (Italic type indicates deductible and co-insurance are waived)    Patient Name: Maegan Cleary  Today's Date: 10/3/2024    Health Maintenance       Date Due Completion Date    RSV Vaccine (Age 60+ and Pregnant patients) (1 - 1-dose 75+ series) Never done ---    Influenza Vaccine (1) 09/01/2024 9/22/2023    COVID-19 Vaccine (9 - 2024-25 season) 09/01/2024 11/3/2022    Lipid Panel 04/02/2029 4/2/2024    TETANUS VACCINE 10/11/2032 10/11/2022    Override on 1/1/2016: Done        No orders of the defined types were placed in this encounter.      The following information is provided to all patients.  This information is to help you find resources for any of the problems found today that may be affecting your health:                  Living healthy guide: www.[facility].[place]      Understanding Diabetes: www.diabetes.org      Eating healthy: www.cdc.gov/healthyweight      [facility] home safety checklist: www.cdc.gov/steadi/patient.html      Agency on Aging: www.goea.[place]      Alcoholics anonymous (AA): www.aa.org      Physical Activity: www.harvey.nih.gov/ks0afmq      Tobacco use: www.quitwithusla.org

## 2024-10-03 NOTE — PROGRESS NOTES
Maegan Cleary presented for a  Medicare AWV and comprehensive Health Risk Assessment today. The following components were reviewed and updated:    Medical history  Family History  Social history  Allergies and Current Medications  Health Risk Assessment  Health Maintenance  Care Team         ** See Completed Assessments for Annual Wellness Visit within the encounter summary.**         The following assessments were completed:  Living Situation  CAGE  Depression Screening  Timed Get Up and Go  Whisper Test  Cognitive Function Screening        Nutrition Screening  ADL Screening  PAQ Screening      Opioid documentation for eAWV      Patient does not have a current opioid prescription.        Review for Substance Use Disorders: Patient does not use substance        Current Outpatient Medications:     aspirin 81 MG Chew, Take 1 tablet (81 mg total) by mouth once daily., Disp: , Rfl:     cholecalciferol, vitamin D3, (VITAMIN D3) 50 mcg (2,000 unit) Tab, Take 1 tablet by mouth once daily. , Disp: , Rfl:     co-enzyme Q-10 30 mg capsule, Take 100 mg by mouth once daily., Disp: , Rfl:     cyanocobalamin 2000 MCG tablet, Take 5,000 mcg by mouth once daily. , Disp: , Rfl:     gabapentin (NEURONTIN) 300 MG capsule, Take 1 capsule (300 mg total) by mouth 3 (three) times daily as needed., Disp: 270 capsule, Rfl: 1    hydroCHLOROthiazide (HYDRODIURIL) 12.5 MG Tab, Take 1 tablet (12.5 mg total) by mouth once daily., Disp: 90 tablet, Rfl: 3    hydrOXYzine pamoate (VISTARIL) 50 MG Cap, TAKE 1 CAPSULE BY MOUTH IN THE EVENING, Disp: 90 capsule, Rfl: 0    losartan (COZAAR) 50 MG tablet, Take 1 tablet (50 mg total) by mouth once daily., Disp: 90 tablet, Rfl: 3    nebivoloL (BYSTOLIC) 10 MG Tab, Take 1 tablet by mouth once daily, Disp: 90 tablet, Rfl: 3    rosuvastatin (CRESTOR) 10 MG tablet, Take 1 tablet (10 mg total) by mouth once daily., Disp: 90 tablet, Rfl: 3    SAW PALMETTO ORAL, Take 1 tablet by mouth once daily., Disp: , Rfl:    No current facility-administered medications for this visit.    Facility-Administered Medications Ordered in Other Visits:     0.9%  NaCl infusion, , Intravenous, Once, Allison Roa, NP    ceFAZolin 2 g in dextrose 5 % in water (D5W) 50 mL IVPB (MB+), 2 g, Intravenous, On Call Procedure, James Araya MD       Vitals:    10/03/24 1436   BP: 116/60   Pulse: 64   SpO2: 97%   Weight: 92.6 kg (204 lb 2.3 oz)   PainSc: 0-No pain      Physical Exam  Constitutional:       General: He is not in acute distress.  HENT:      Head: Normocephalic and atraumatic.   Cardiovascular:      Rate and Rhythm: Normal rate and regular rhythm.   Pulmonary:      Effort: Pulmonary effort is normal. No respiratory distress.      Breath sounds: Normal breath sounds. No wheezing.   Abdominal:      General: Bowel sounds are normal.      Tenderness: There is no abdominal tenderness.   Musculoskeletal:      Right lower leg: No edema.      Left lower leg: No edema.   Lymphadenopathy:      Cervical: No cervical adenopathy.   Neurological:      Mental Status: He is alert and oriented to person, place, and time.               Diagnoses and health risks identified today and associated recommendations/orders:    1. Encounter for preventive health examination  - Chart reviewed. Problem list updated. Discussed current medical diagnosis, current medications, medical/surgical/family/social history; updated provider list; documented vital signs; identified any cognitive impairment; and updated risk factor list. Addressed any outstanding health maintenance. Provided patient with personalized health advice. Continue to follow up with PCP and any specialists.     2. Essential hypertension  Chronic; stable on current treatment plan; follow up with PCP  He is taking vistaril, losartan, and HCTZ    3. Other hyperlipidemia  Chronic; stable on current treatment plan; follow up with PCP  He is taking rosuvastatin     4. Acute midline low back pain without  sciatica  Chronic; stable on current treatment plan; follow up with PCP  He is taking gabapentin nightly     5. Overweight (BMI 25.0-29.9)  - Recommendation for healthy diet and increasing exercise as tolerated with goal of 150min/week .         Provided Issa with a 5-10 year written screening schedule and personal prevention plan. Recommendations were developed using the USPSTF age appropriate recommendations. Education, counseling, and referrals were provided as needed. After Visit Summary printed and given to patient which includes a list of additional screenings\tests needed.    No follow-ups on file.    Marleni Hollingswroth PA-C  Family Practice/Internal Medicine   Office Phone: [phone]      Advance Care Planning

## 2024-10-07 ENCOUNTER — CLINICAL SUPPORT (OUTPATIENT)
Dept: CARDIOLOGY | Facility: HOSPITAL | Age: 79
End: 2024-10-07
Payer: MEDICARE

## 2024-10-07 ENCOUNTER — CLINICAL SUPPORT (OUTPATIENT)
Dept: CARDIOLOGY | Facility: HOSPITAL | Age: 79
End: 2024-10-07
Attending: INTERNAL MEDICINE
Payer: MEDICARE

## 2024-10-07 DIAGNOSIS — I49.8 OTHER SPECIFIED CARDIAC ARRHYTHMIAS: ICD-10-CM

## 2024-10-07 PROCEDURE — 93298 REM INTERROG DEV EVAL SCRMS: CPT | Mod: HCNC | Performed by: INTERNAL MEDICINE

## 2024-10-07 PROCEDURE — 93298 REM INTERROG DEV EVAL SCRMS: CPT | Mod: 26,HCNC,, | Performed by: INTERNAL MEDICINE

## 2024-10-17 ENCOUNTER — TELEPHONE (OUTPATIENT)
Dept: ELECTROPHYSIOLOGY | Facility: CLINIC | Age: 79
End: 2024-10-17
Payer: MEDICARE

## 2024-10-17 NOTE — TELEPHONE ENCOUNTER
Pt returned the call and stated he was asleep at the time of the event.  Pt asymptomatic, w/o any complaints.  Advised pt that this information would be sent to Device nurse to review with Dr. Araya and that if the doctor has any recommendations he will receive a return call otherwise the [facility] will continue to monitor.  Understanding was verbalized.  Pt appreciated the call. Information verbally given to Device RN (Becki).

## 2024-10-17 NOTE — TELEPHONE ENCOUNTER
ILR remote transmission received today for AF episode lasting 1 hour 48 minutes, avg 80 bpm, egm c/w AF.        Overall burden:  <1%     Max duration seen: 1 hour 48 minutes     Most recent episode: 10/7/2024     Ventricular rates:   Controlled         Anticoagulation status:  None     Bp 116/20     Patient symptoms: Pt was asleep at the time of the event.                Enlarged gained (5x) in [facility]

## 2024-10-28 ENCOUNTER — PATIENT MESSAGE (OUTPATIENT)
Dept: INTERNAL MEDICINE | Facility: CLINIC | Age: 79
End: 2024-10-28
Payer: MEDICARE

## 2024-10-31 LAB
OHS CV AF BURDEN PERCENT: < 1
OHS CV DC REMOTE DEVICE TYPE: NORMAL
OHS CV ICD SHOCK: NO

## 2024-11-10 ENCOUNTER — CLINICAL SUPPORT (OUTPATIENT)
Dept: CARDIOLOGY | Facility: HOSPITAL | Age: 79
End: 2024-11-10
Payer: MEDICARE

## 2024-11-10 ENCOUNTER — CLINICAL SUPPORT (OUTPATIENT)
Dept: CARDIOLOGY | Facility: HOSPITAL | Age: 79
End: 2024-11-10
Attending: INTERNAL MEDICINE
Payer: MEDICARE

## 2024-11-10 DIAGNOSIS — I49.8 OTHER SPECIFIED CARDIAC ARRHYTHMIAS: ICD-10-CM

## 2024-11-10 PROCEDURE — 93298 REM INTERROG DEV EVAL SCRMS: CPT | Mod: HCNC | Performed by: INTERNAL MEDICINE

## 2024-11-10 PROCEDURE — 93298 REM INTERROG DEV EVAL SCRMS: CPT | Mod: 26,HCNC,, | Performed by: INTERNAL MEDICINE

## 2024-11-25 LAB
OHS CV AF BURDEN PERCENT: < 1
OHS CV DC REMOTE DEVICE TYPE: NORMAL
OHS CV ICD SHOCK: NO

## 2024-12-11 ENCOUNTER — CLINICAL SUPPORT (OUTPATIENT)
Dept: CARDIOLOGY | Facility: HOSPITAL | Age: 79
End: 2024-12-11
Payer: MEDICARE

## 2024-12-11 ENCOUNTER — CLINICAL SUPPORT (OUTPATIENT)
Dept: CARDIOLOGY | Facility: HOSPITAL | Age: 79
End: 2024-12-11
Attending: INTERNAL MEDICINE
Payer: MEDICARE

## 2024-12-11 DIAGNOSIS — I49.8 OTHER SPECIFIED CARDIAC ARRHYTHMIAS: ICD-10-CM

## 2024-12-11 PROCEDURE — 93298 REM INTERROG DEV EVAL SCRMS: CPT | Mod: 26,HCNC,, | Performed by: INTERNAL MEDICINE

## 2024-12-11 PROCEDURE — 93298 REM INTERROG DEV EVAL SCRMS: CPT | Mod: HCNC | Performed by: INTERNAL MEDICINE

## 2024-12-20 ENCOUNTER — TELEPHONE (OUTPATIENT)
Dept: CARDIOLOGY | Facility: HOSPITAL | Age: 79
End: 2024-12-20
Payer: MEDICARE

## 2024-12-20 ENCOUNTER — PATIENT MESSAGE (OUTPATIENT)
Dept: ELECTROPHYSIOLOGY | Facility: CLINIC | Age: 79
End: 2024-12-20
Payer: MEDICARE

## 2024-12-20 DIAGNOSIS — I48.0 PAROXYSMAL ATRIAL FIBRILLATION: Primary | ICD-10-CM

## 2024-12-20 RX ORDER — NEBIVOLOL 10 MG/1
10 TABLET ORAL 2 TIMES DAILY
Qty: 90 TABLET | Refills: 3 | Status: SHIPPED | OUTPATIENT
Start: 2024-12-20

## 2024-12-20 NOTE — TELEPHONE ENCOUNTER
ILR remote transmission received for AT/AF episodes x 3 since 12/19/2024 at 0816, current episode ongoing since 12/19/2024. V rates controlled    Not on OACs      Bp 120/70, HR 56 - 60 bpm per patient    Pt symptoms:  none                         Presenting egm from 12/20/24 at 1231

## 2024-12-23 ENCOUNTER — PATIENT MESSAGE (OUTPATIENT)
Dept: ELECTROPHYSIOLOGY | Facility: CLINIC | Age: 79
End: 2024-12-23
Payer: MEDICARE

## 2024-12-23 RX ORDER — NEBIVOLOL 10 MG/1
10 TABLET ORAL 2 TIMES DAILY
Qty: 180 TABLET | Refills: 3 | Status: SHIPPED | OUTPATIENT
Start: 2024-12-23

## 2024-12-23 NOTE — TELEPHONE ENCOUNTER
Can you please review transmission and confirm SR with Dr Cleary? I was supposed to set up DCCV if still out of rhythm.  Thanks

## 2024-12-23 NOTE — TELEPHONE ENCOUNTER
"I spoke with patient over the phone to confirm appt with Dr. Araya on 12/27 @ 8am.  He informed me his pharmacy would not refill his prescription for Nebivolol due to administration instructions.  The prescription read "Take one tab by mouth twice daily. Take one tab by mouth take daily."   I called his pharmacy and provided clarification to [facility] Pharmacy tech @ [facility].    "

## 2024-12-30 ENCOUNTER — HOSPITAL ENCOUNTER (OUTPATIENT)
Dept: CARDIOLOGY | Facility: HOSPITAL | Age: 79
Discharge: HOME | End: 2024-12-30
Attending: INTERNAL MEDICINE
Payer: MEDICARE

## 2024-12-30 VITALS — WEIGHT: 204 LBS | HEIGHT: 72 IN | BODY MASS INDEX: 27.63 KG/M2

## 2024-12-30 DIAGNOSIS — I48.0 PAROXYSMAL ATRIAL FIBRILLATION: ICD-10-CM

## 2024-12-30 PROCEDURE — 93306 TTE W/DOPPLER COMPLETE: CPT | Mod: 26,,, | Performed by: INTERNAL MEDICINE

## 2024-12-30 PROCEDURE — 93306 TTE W/DOPPLER COMPLETE: CPT

## 2024-12-31 LAB
APICAL FOUR CHAMBER EJECTION FRACTION: 69 %
APICAL TWO CHAMBER EJECTION FRACTION: 65 %
ASCENDING AORTA: 3.8 CM
AV INDEX (PROSTH): 0.55
AV MEAN GRADIENT: 5.7 MMHG
AV PEAK GRADIENT: 10.2 MMHG
AV REGURGITATION PRESSURE HALF TIME: 677.63 MS
AV VALVE AREA BY VELOCITY RATIO: 1.8 CM²
AV VALVE AREA: 1.7 CM²
AV VELOCITY RATIO: 0.56
BSA FOR ECHO PROCEDURE: 2.17 M2
CV ECHO LV RWT: 0.36 CM
DOP CALC AO PEAK VEL: 1.6 M/S
DOP CALC AO VTI: 38.2 CM
DOP CALC LVOT AREA: 3.1 CM2
DOP CALC LVOT DIAMETER: 2 CM
DOP CALC LVOT PEAK VEL: 0.9 M/S
DOP CALC LVOT STROKE VOLUME: 65.6 CM3
DOP CALC MV VTI: 24.8 CM
DOP CALCLVOT PEAK VEL VTI: 20.9 CM
E WAVE DECELERATION TIME: 259.92 MSEC
E/A RATIO: 1.72
E/E' RATIO: 9 M/S
ECHO LV POSTERIOR WALL: 1 CM (ref 0.6–1.1)
FRACTIONAL SHORTENING: 46.4 % (ref 28–44)
INTERVENTRICULAR SEPTUM: 1 CM (ref 0.6–1.1)
IVC DIAMETER: 2.03 CM
LEFT ATRIUM AREA SYSTOLIC (APICAL 2 CHAMBER): 23.91 CM2
LEFT ATRIUM AREA SYSTOLIC (APICAL 4 CHAMBER): 19.61 CM2
LEFT ATRIUM VOLUME INDEX MOD: 28.7 ML/M2
LEFT ATRIUM VOLUME MOD: 61.74 ML
LEFT INTERNAL DIMENSION IN SYSTOLE: 3 CM (ref 2.1–4)
LEFT VENTRICLE DIASTOLIC VOLUME INDEX: 70.24 ML/M2
LEFT VENTRICLE DIASTOLIC VOLUME: 151.02 ML
LEFT VENTRICLE END DIASTOLIC VOLUME APICAL 2 CHAMBER: 80.7 ML
LEFT VENTRICLE END DIASTOLIC VOLUME APICAL 4 CHAMBER: 101.92 ML
LEFT VENTRICLE END SYSTOLIC VOLUME APICAL 2 CHAMBER: 71.07 ML
LEFT VENTRICLE END SYSTOLIC VOLUME APICAL 4 CHAMBER: 50.52 ML
LEFT VENTRICLE MASS INDEX: 102.2 G/M2
LEFT VENTRICLE SYSTOLIC VOLUME INDEX: 15.7 ML/M2
LEFT VENTRICLE SYSTOLIC VOLUME: 33.67 ML
LEFT VENTRICULAR INTERNAL DIMENSION IN DIASTOLE: 5.6 CM (ref 3.5–6)
LEFT VENTRICULAR MASS: 219.7 G
LV LATERAL E/E' RATIO: 6.75 M/S
LV SEPTAL E/E' RATIO: 13.5 M/S
LVED V (TEICH): 151.02 ML
LVES V (TEICH): 33.67 ML
LVOT MG: 2.07 MMHG
LVOT MV: 0.69 CM/S
MV MEAN GRADIENT: 1 MMHG
MV PEAK A VEL: 0.47 M/S
MV PEAK E VEL: 0.81 M/S
MV PEAK GRADIENT: 3 MMHG
MV STENOSIS PRESSURE HALF TIME: 70.87 MS
MV VALVE AREA BY CONTINUITY EQUATION: 2.65 CM2
MV VALVE AREA P 1/2 METHOD: 3.1 CM2
OHS CV RV/LV RATIO: 0.91 CM
OHS LV EJECTION FRACTION SIMPSONS BIPLANE MOD: 67 %
PISA AR MAX VEL: 3.78 M/S
PISA MRMAX VEL: 4.61 M/S
PISA TR MAX VEL: 3.1 M/S
RA PRESSURE ESTIMATED: 3 MMHG
RA VOL SYS: 91.46 ML
RIGHT ATRIAL AREA: 25.5 CM2
RIGHT ATRIUM END SYSTOLIC VOLUME APICAL 4 CHAMBER INDEX BSA: 42.4 ML/M2
RIGHT ATRIUM VOLUME AREA LENGTH APICAL 4 CHAMBER: 92.04 ML
RIGHT VENTRICLE DIASTOLIC BASEL DIMENSION: 5.1 CM
RV TB RVSP: 6 MMHG
RV TISSUE DOPPLER FREE WALL SYSTOLIC VELOCITY 1 (APICAL 4 CHAMBER VIEW): 16.48 CM/S
SINUS: 3.45 CM
STJ: 3.19 CM
TDI LATERAL: 0.12 M/S
TDI SEPTAL: 0.06 M/S
TDI: 0.09 M/S
TR MAX PG: 38 MMHG
TRICUSPID ANNULAR PLANE SYSTOLIC EXCURSION: 2.7 CM
TV REST PULMONARY ARTERY PRESSURE: 41 MMHG
Z-SCORE OF LEFT VENTRICULAR DIMENSION IN END DIASTOLE: -2.2
Z-SCORE OF LEFT VENTRICULAR DIMENSION IN END SYSTOLE: -2.76

## 2025-01-03 ENCOUNTER — PATIENT MESSAGE (OUTPATIENT)
Dept: ELECTROPHYSIOLOGY | Facility: CLINIC | Age: 80
End: 2025-01-03
Payer: MEDICARE

## 2025-01-09 ENCOUNTER — TELEPHONE (OUTPATIENT)
Dept: ELECTROPHYSIOLOGY | Facility: CLINIC | Age: 80
End: 2025-01-09
Payer: MEDICARE

## 2025-01-10 ENCOUNTER — OFFICE VISIT (OUTPATIENT)
Dept: ELECTROPHYSIOLOGY | Facility: CLINIC | Age: 80
End: 2025-01-10
Payer: MEDICARE

## 2025-01-10 VITALS
DIASTOLIC BLOOD PRESSURE: 62 MMHG | SYSTOLIC BLOOD PRESSURE: 112 MMHG | WEIGHT: 207 LBS | HEIGHT: 72 IN | HEART RATE: 57 BPM | BODY MASS INDEX: 28.04 KG/M2

## 2025-01-10 DIAGNOSIS — I49.49 PREMATURE BEATS: ICD-10-CM

## 2025-01-10 DIAGNOSIS — I10 ESSENTIAL HYPERTENSION: ICD-10-CM

## 2025-01-10 DIAGNOSIS — I48.0 PAROXYSMAL ATRIAL FIBRILLATION: Primary | ICD-10-CM

## 2025-01-10 DIAGNOSIS — G47.33 OSA ON CPAP: ICD-10-CM

## 2025-01-10 LAB
OHS CV AF BURDEN PERCENT: 7
OHS CV DC REMOTE DEVICE TYPE: NORMAL
OHS CV ICD SHOCK: NO
OHS QRS DURATION: 138 MS
OHS QTC CALCULATION: 453 MS

## 2025-01-10 PROCEDURE — 99999 PR PBB SHADOW E&M-EST. PATIENT-LVL III: CPT | Mod: PBBFAC,HCNC,, | Performed by: INTERNAL MEDICINE

## 2025-01-10 NOTE — PROGRESS NOTES
Subjective   Patient ID:  Maegan Cleary is a 79 y.o. 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  ILR implanted 7/17/10.  Bystolic initiated.     Update:  ILR has revealed increasing AF, despite increasing Bystolic.    ILR reviewed, reveals AF up to 39 hours, last episode 1/9/24     Echo 12/30/24     Left Ventricle: The left ventricle is normal in size. Normal wall thickness. Normal wall motion. There is normal systolic function. Quantitated ejection fraction is 67%. Grade I diastolic dysfunction.    Right Ventricle: Right ventricular enlargement. Wall thickness is normal. Systolic function is normal.    Right Atrium: Right atrium is dilated. RA volume index is 42.4 mL/m2.    Tricuspid Valve: There is moderate regurgitation.    Aorta: Aortic root is normal in size measuring 3.45 cm. Ascending aorta is mildly dilated measuring 3.8 cm.    Pulmonary Artery: There is mild pulmonary hypertension. The estimated pulmonary artery systolic pressure is 41 mmHg.    Review of Systems   Constitutional: Negative. Negative for fever and malaise/fatigue.   HENT:  Negative for congestion and sore throat.    Cardiovascular:  Positive for palpitations. Negative for chest pain, dyspnea on exertion, irregular heartbeat, leg swelling, near-syncope, orthopnea, paroxysmal nocturnal dyspnea and syncope.   Respiratory:  Negative for cough and shortness of breath.    Gastrointestinal:  Negative for abdominal pain, constipation and diarrhea.   Neurological:  Negative for dizziness, light-headedness and weakness.   Psychiatric/Behavioral:  Negative for depression. The patient is not nervous/anxious.    All other systems reviewed and are negative.         Objective     Physical Exam  Constitutional:       Appearance: He is well-developed.   Eyes:      General: No scleral icterus.      Conjunctiva/sclera: Conjunctivae normal.   Neck:      Vascular: No JVD.      Trachea: No tracheal deviation.   Cardiovascular:      Rate and Rhythm: Normal rate and regular rhythm.      Chest Wall: PMI is not displaced.      Heart sounds: Normal heart sounds.   Pulmonary:      Effort: Pulmonary effort is normal. No respiratory distress.      Breath sounds: Normal breath sounds.   Abdominal:      Palpations: Abdomen is soft.      Tenderness: There is no abdominal tenderness.   Musculoskeletal:         General: No tenderness.   Skin:     General: Skin is warm and dry.      Findings: No rash.   Neurological:      Mental Status: He is alert and oriented to person, place, and time.   Psychiatric:         Behavior: Behavior normal.            Assessment and Plan     1. Paroxysmal atrial fibrillation    2. Essential hypertension    3. Premature beats    4. CRIS on CPAP        Plan:     Increasing atrial fibrillation, despite increasing beta blocker.  Discussed options at length, including anti-arrhythmic therapy vs PVI (PFA).  Risks and benefits of PVI with PFA discussed, including posterior wall isolation >> he would like to proceed.  Hold Eliquis morning of procedure.

## 2025-01-11 ENCOUNTER — CLINICAL SUPPORT (OUTPATIENT)
Dept: CARDIOLOGY | Facility: HOSPITAL | Age: 80
End: 2025-01-11
Attending: INTERNAL MEDICINE
Payer: MEDICARE

## 2025-01-11 ENCOUNTER — CLINICAL SUPPORT (OUTPATIENT)
Dept: CARDIOLOGY | Facility: HOSPITAL | Age: 80
End: 2025-01-11
Payer: MEDICARE

## 2025-01-11 DIAGNOSIS — I49.8 OTHER SPECIFIED CARDIAC ARRHYTHMIAS: ICD-10-CM

## 2025-01-11 PROCEDURE — 93298 REM INTERROG DEV EVAL SCRMS: CPT | Mod: 26,HCNC,, | Performed by: INTERNAL MEDICINE

## 2025-01-11 PROCEDURE — 93298 REM INTERROG DEV EVAL SCRMS: CPT | Mod: HCNC | Performed by: INTERNAL MEDICINE

## 2025-01-23 DIAGNOSIS — I48.0 PAROXYSMAL ATRIAL FIBRILLATION: Primary | ICD-10-CM

## 2025-01-23 DIAGNOSIS — I49.9 CARDIAC ARRHYTHMIA, UNSPECIFIED CARDIAC ARRHYTHMIA TYPE: ICD-10-CM

## 2025-01-23 LAB
OHS CV AF BURDEN PERCENT: < 1
OHS CV DC REMOTE DEVICE TYPE: NORMAL

## 2025-02-06 ENCOUNTER — CLINICAL SUPPORT (OUTPATIENT)
Dept: CARDIOLOGY | Facility: HOSPITAL | Age: 80
End: 2025-02-06
Payer: MEDICARE

## 2025-02-06 ENCOUNTER — PATIENT MESSAGE (OUTPATIENT)
Dept: ELECTROPHYSIOLOGY | Facility: CLINIC | Age: 80
End: 2025-02-06
Payer: MEDICARE

## 2025-02-06 DIAGNOSIS — I49.8 OTHER SPECIFIED CARDIAC ARRHYTHMIAS: ICD-10-CM

## 2025-02-11 ENCOUNTER — CLINICAL SUPPORT (OUTPATIENT)
Dept: CARDIOLOGY | Facility: HOSPITAL | Age: 80
End: 2025-02-11
Attending: INTERNAL MEDICINE
Payer: MEDICARE

## 2025-02-11 DIAGNOSIS — I49.8 OTHER SPECIFIED CARDIAC ARRHYTHMIAS: ICD-10-CM

## 2025-02-11 PROCEDURE — 93298 REM INTERROG DEV EVAL SCRMS: CPT | Performed by: INTERNAL MEDICINE

## 2025-02-11 PROCEDURE — 93298 REM INTERROG DEV EVAL SCRMS: CPT | Mod: 26,,, | Performed by: INTERNAL MEDICINE

## 2025-02-14 DIAGNOSIS — I10 ESSENTIAL HYPERTENSION: ICD-10-CM

## 2025-02-14 DIAGNOSIS — Z12.5 ENCOUNTER FOR SCREENING FOR MALIGNANT NEOPLASM OF PROSTATE: ICD-10-CM

## 2025-02-14 DIAGNOSIS — R79.9 ABNORMAL FINDING OF BLOOD CHEMISTRY, UNSPECIFIED: ICD-10-CM

## 2025-02-14 DIAGNOSIS — Z00.00 WELL ADULT EXAM: Primary | ICD-10-CM

## 2025-02-21 LAB
OHS CV AF BURDEN PERCENT: 3
OHS CV DC REMOTE DEVICE TYPE: NORMAL

## 2025-02-27 ENCOUNTER — LAB VISIT (OUTPATIENT)
Dept: LAB | Facility: HOSPITAL | Age: 80
End: 2025-02-27
Attending: INTERNAL MEDICINE
Payer: MEDICARE

## 2025-02-27 DIAGNOSIS — I48.0 PAROXYSMAL ATRIAL FIBRILLATION: ICD-10-CM

## 2025-02-27 DIAGNOSIS — I49.9 CARDIAC ARRHYTHMIA, UNSPECIFIED CARDIAC ARRHYTHMIA TYPE: ICD-10-CM

## 2025-02-27 LAB
ANION GAP SERPL CALC-SCNC: 7 MMOL/L (ref 8–16)
APTT PPP: 31.5 SEC (ref 21–32)
BUN SERPL-MCNC: 14 MG/DL (ref 8–23)
CALCIUM SERPL-MCNC: 9 MG/DL (ref 8.7–10.5)
CHLORIDE SERPL-SCNC: 105 MMOL/L (ref 95–110)
CO2 SERPL-SCNC: 28 MMOL/L (ref 23–29)
CREAT SERPL-MCNC: 1 MG/DL (ref 0.5–1.4)
ERYTHROCYTE [DISTWIDTH] IN BLOOD BY AUTOMATED COUNT: 13 % (ref 11.5–14.5)
EST. GFR  (NO RACE VARIABLE): >60 ML/MIN/1.73 M^2
GLUCOSE SERPL-MCNC: 98 MG/DL (ref 70–110)
HCT VFR BLD AUTO: 46.6 % (ref 40–54)
HGB BLD-MCNC: 15.4 G/DL (ref 14–18)
INR PPP: 1 (ref 0.8–1.2)
MCH RBC QN AUTO: 31.3 PG (ref 27–31)
MCHC RBC AUTO-ENTMCNC: 33 G/DL (ref 32–36)
MCV RBC AUTO: 95 FL (ref 82–98)
PLATELET # BLD AUTO: 189 K/UL (ref 150–450)
PMV BLD AUTO: 10.7 FL (ref 9.2–12.9)
POTASSIUM SERPL-SCNC: 4.5 MMOL/L (ref 3.5–5.1)
PROTHROMBIN TIME: 11.4 SEC (ref 9–12.5)
RBC # BLD AUTO: 4.92 M/UL (ref 4.6–6.2)
SODIUM SERPL-SCNC: 140 MMOL/L (ref 136–145)
WBC # BLD AUTO: 6.84 K/UL (ref 3.9–12.7)

## 2025-02-27 PROCEDURE — 36415 COLL VENOUS BLD VENIPUNCTURE: CPT | Mod: HCNC,PO | Performed by: INTERNAL MEDICINE

## 2025-02-27 PROCEDURE — 85610 PROTHROMBIN TIME: CPT | Mod: HCNC | Performed by: INTERNAL MEDICINE

## 2025-02-27 PROCEDURE — 85027 COMPLETE CBC AUTOMATED: CPT | Mod: HCNC | Performed by: INTERNAL MEDICINE

## 2025-02-27 PROCEDURE — 80048 BASIC METABOLIC PNL TOTAL CA: CPT | Mod: HCNC | Performed by: INTERNAL MEDICINE

## 2025-02-27 PROCEDURE — 85730 THROMBOPLASTIN TIME PARTIAL: CPT | Mod: HCNC | Performed by: INTERNAL MEDICINE

## 2025-03-03 ENCOUNTER — PATIENT MESSAGE (OUTPATIENT)
Dept: ELECTROPHYSIOLOGY | Facility: CLINIC | Age: 80
End: 2025-03-03
Payer: MEDICARE

## 2025-03-03 ENCOUNTER — TELEPHONE (OUTPATIENT)
Dept: ELECTROPHYSIOLOGY | Facility: CLINIC | Age: 80
End: 2025-03-03
Payer: MEDICARE

## 2025-03-03 NOTE — TELEPHONE ENCOUNTER
Left message asking patient to return call to go over instructions for PVI PFA ablation scheduled on 3/6/25.

## 2025-03-03 NOTE — TELEPHONE ENCOUNTER
Spoke to patient    CONFIRMED procedure arrival time of 8 am    Reiterated instructions including:    -Directions to check in desk    -NPO after midnight night prior to procedure. Fasting upon arrival to the hospital the day of the procedure. Patient allowed to drink water up until 2 hours prior to arrival due to IV fluid shortage.     -High importance of HOLDING Eliquis on day of procedure (last dose on 3/5/25)    - Confirms no new meds prescribed or med changes since scheduling -     -Pre-procedure LABS Reviewed, no alerts noted    -Confirmed absence or presence of implanted device/stimulator Bsci ILR in situ    -Confirmed no recent or current fever, cough, or shortness of breath .    -Confirmed no redness, rash, irritation, or yeast infection to skin/ chest / groin area.     Patient verbalized understanding of above, denies any further questions and appreciated the call.

## 2025-03-05 NOTE — SUBJECTIVE & OBJECTIVE
Past Medical History:   Diagnosis Date    Hyperlipidemia     Hypertension     Insomnia     CRIS (obstructive sleep apnea)     CRIS on CPAP     Vitamin D deficiency        Past Surgical History:   Procedure Laterality Date    BASAL CELL CARCINOMA EXCISION      cataract surgery      Colorectal Surgery  11/07/2019    Anal Sphincterectomy    INSERTION OF IMPLANTABLE LOOP RECORDER N/A 7/17/2020    Procedure: Insertion, Implantable Loop Recorder;  Surgeon: James Araya MD;  Location: [facility] EP LAB;  Service: Cardiology;  Laterality: N/A;  PAF, ILR, MDT, Local, SK, 3 Prep    INSERTION OF IMPLANTABLE LOOP RECORDER N/A 1/25/2024    Procedure: Insertion, Implantable Loop Recorder;  Surgeon: James Araya MD;  Location: [facility] EP LAB;  Service: Cardiology;  Laterality: N/A;    REMOVAL OF IMPLANTABLE LOOP RECORDER N/A 1/25/2024    Procedure: REMOVAL, IMPLANTABLE LOOP RECORDER;  Surgeon: James Araya MD;  Location: [facility] EP LAB;  Service: Cardiology;  Laterality: N/A;  REE, ILR remove and replace, BSthanh, RN Sedate, SK, 3 Prep *MDT ILR in situ*    TRANSFORAMINAL EPIDURAL INJECTION OF STEROID Right 8/26/2019    Procedure: INJECTION, STEROID, EPIDURAL, TRANSFORAMINAL APPROACH;  Surgeon: Maikel Millan MD;  Location: [facility];  Service: Pain Management;  Laterality: Right;  RT TF JOSE MARIA L4-5, LF-S1  *add Monday per Dr Millan       Review of patient's allergies indicates:  No Known Allergies    Current Facility-Administered Medications on File Prior to Encounter   Medication    0.9%  NaCl infusion     Current Outpatient Medications on File Prior to Encounter   Medication Sig    apixaban (ELIQUIS) 5 mg Tab Take 1 tablet (5 mg total) by mouth 2 (two) times daily.    cholecalciferol, vitamin D3, (VITAMIN D3) 50 mcg (2,000 unit) Tab Take 1 tablet by mouth once daily.     co-enzyme Q-10 30 mg capsule Take 100 mg by mouth once daily.    cyanocobalamin 2000 MCG tablet Take 5,000 mcg by mouth once daily.     gabapentin (NEURONTIN) 300 MG  capsule Take 1 capsule (300 mg total) by mouth 3 (three) times daily as needed.    hydroCHLOROthiazide (HYDRODIURIL) 12.5 MG Tab Take 1 tablet (12.5 mg total) by mouth once daily.    hydrOXYzine pamoate (VISTARIL) 50 MG Cap TAKE 1 CAPSULE BY MOUTH IN THE EVENING    losartan (COZAAR) 50 MG tablet Take 1 tablet (50 mg total) by mouth once daily.    nebivoloL (BYSTOLIC) 10 MG Tab Take 1 tablet (10 mg total) by mouth 2 (two) times daily.    rosuvastatin (CRESTOR) 10 MG tablet Take 1 tablet (10 mg total) by mouth once daily.    SAW PALMETTO ORAL Take 1 tablet by mouth once daily.     Family History       Problem Relation (Age of Onset)    Atrial fibrillation Brother    Cancer Sister, Brother    Heart attack Brother    Hypertension Brother    No Known Problems Brother, Son, Son, Son    Prostate cancer Brother          Tobacco Use    Smoking status: Former     Types: Cigarettes    Smokeless tobacco: Never    Tobacco comments:     Stopped about 40 years ago   Substance and Sexual Activity    Alcohol use: No     Comment: Rarely, only on special occasions    Drug use: No    Sexual activity: Yes     Partners: Female     Review of Systems   All other systems reviewed and are negative.    Objective:     Vital Signs (Most Recent):    Vital Signs (24h Range):  BP: ()/()   Arterial Line BP: ()/()           There is no height or weight on file to calculate BMI.             Physical Exam  Vitals and nursing note reviewed.   Constitutional:       Appearance: Normal appearance.   HENT:      Head: Normocephalic.   Cardiovascular:      Rate and Rhythm: Normal rate and regular rhythm.      Pulses: Normal pulses.      Heart sounds: Normal heart sounds.   Pulmonary:      Effort: Pulmonary effort is normal.      Breath sounds: Normal breath sounds.   Abdominal:      General: Abdomen is flat.   Musculoskeletal:      Right lower leg: No edema.      Left lower leg: No edema.   Skin:     General: Skin is warm.   Neurological:      General: No  focal deficit present.      Mental Status: He is alert.            Significant Labs: All pertinent lab results from the last 24 hours have been reviewed.

## 2025-03-05 NOTE — ASSESSMENT & PLAN NOTE
In summary, Mr. Cleary is a 78yo male with an increasing burden of symptomatic atrial fibrillation here for PVI with PFA. EF 12/2024 normal. On Eliquis 5mg bid for stroke prevention. Also on Bystolic.    RONNY, cancel if NSR.    Our discussion included, but was not limited to the risk of death, infection, bleeding, stroke, MI, cardiac perforation, embolism, cardiac tamponade, vascular injury, valvular injury, AE fistula (RFA), injury to phrenic nerve (RFA), pulmonary vein stenosis (RFA), rhabdomyolysis (PFA), hemolysis (PFA) and other organic injury including the possibility for need for surgery or pacemaker implantation. Patient verbalized understanding and wishes to proceed. All questions and concerns were answered. Consents signed.

## 2025-03-05 NOTE — HPI
Maegan Cleary is a 79 y.o. male with atrial fibrillation, palpitations, Htn, sleep apnea who presents for PVI with PFA.     Background:  Noted increasing palpitations.  ILR implanted 7/17/10.  ILR has revealed increasing AF, despite increasing Bystolic.  Echo 12/30/24: 67%    ECG:

## 2025-03-06 ENCOUNTER — HOSPITAL ENCOUNTER (OUTPATIENT)
Dept: CARDIOLOGY | Facility: HOSPITAL | Age: 80
Discharge: HOME | End: 2025-03-06
Attending: INTERNAL MEDICINE | Admitting: INTERNAL MEDICINE
Payer: MEDICARE

## 2025-03-06 ENCOUNTER — ANESTHESIA (OUTPATIENT)
Dept: MEDSURG UNIT | Facility: HOSPITAL | Age: 80
End: 2025-03-06
Payer: MEDICARE

## 2025-03-06 ENCOUNTER — ANESTHESIA EVENT (OUTPATIENT)
Dept: MEDSURG UNIT | Facility: HOSPITAL | Age: 80
End: 2025-03-06
Payer: MEDICARE

## 2025-03-06 ENCOUNTER — HOSPITAL ENCOUNTER (OUTPATIENT)
Facility: HOSPITAL | Age: 80
Discharge: HOME | End: 2025-03-06
Attending: INTERNAL MEDICINE | Admitting: INTERNAL MEDICINE
Payer: MEDICARE

## 2025-03-06 VITALS
HEART RATE: 71 BPM | DIASTOLIC BLOOD PRESSURE: 67 MMHG | SYSTOLIC BLOOD PRESSURE: 133 MMHG | HEIGHT: 72 IN | BODY MASS INDEX: 27.36 KG/M2 | WEIGHT: 202 LBS

## 2025-03-06 VITALS
HEART RATE: 75 BPM | OXYGEN SATURATION: 96 % | SYSTOLIC BLOOD PRESSURE: 139 MMHG | BODY MASS INDEX: 27.36 KG/M2 | RESPIRATION RATE: 18 BRPM | TEMPERATURE: 98 F | DIASTOLIC BLOOD PRESSURE: 72 MMHG | HEIGHT: 72 IN | WEIGHT: 202 LBS

## 2025-03-06 DIAGNOSIS — I48.91 AF (ATRIAL FIBRILLATION): ICD-10-CM

## 2025-03-06 DIAGNOSIS — I48.91 ATRIAL FIBRILLATION: ICD-10-CM

## 2025-03-06 DIAGNOSIS — I48.0 PAROXYSMAL ATRIAL FIBRILLATION: ICD-10-CM

## 2025-03-06 DIAGNOSIS — I49.9 ARRHYTHMIA: ICD-10-CM

## 2025-03-06 DIAGNOSIS — I49.9 CARDIAC ARRHYTHMIA, UNSPECIFIED CARDIAC ARRHYTHMIA TYPE: ICD-10-CM

## 2025-03-06 LAB
ASCENDING AORTA: 3.5 CM
BSA FOR ECHO PROCEDURE: 2.16 M2
OHS QRS DURATION: 136 MS
OHS QRS DURATION: 138 MS
OHS QTC CALCULATION: 474 MS
OHS QTC CALCULATION: 499 MS
SINUS: 3.7 CM
STJ: 3 CM

## 2025-03-06 PROCEDURE — 93656 COMPRE EP EVAL ABLTJ ATR FIB: CPT | Mod: HCNC,,, | Performed by: INTERNAL MEDICINE

## 2025-03-06 PROCEDURE — 93657 TX L/R ATRIAL FIB ADDL: CPT | Mod: HCNC | Performed by: INTERNAL MEDICINE

## 2025-03-06 PROCEDURE — 93320 DOPPLER ECHO COMPLETE: CPT | Mod: 26,HCNC,, | Performed by: INTERNAL MEDICINE

## 2025-03-06 PROCEDURE — 27201423 OPTIME MED/SURG SUP & DEVICES STERILE SUPPLY: Mod: HCNC | Performed by: INTERNAL MEDICINE

## 2025-03-06 PROCEDURE — C1733 CATH, EP, OTHR THAN COOL-TIP: HCPCS | Mod: HCNC | Performed by: INTERNAL MEDICINE

## 2025-03-06 PROCEDURE — C1753 CATH, INTRAVAS ULTRASOUND: HCPCS | Mod: HCNC | Performed by: INTERNAL MEDICINE

## 2025-03-06 PROCEDURE — 93312 ECHO TRANSESOPHAGEAL: CPT | Mod: 26,HCNC,, | Performed by: INTERNAL MEDICINE

## 2025-03-06 PROCEDURE — C1769 GUIDE WIRE: HCPCS | Mod: HCNC | Performed by: INTERNAL MEDICINE

## 2025-03-06 PROCEDURE — 63600175 PHARM REV CODE 636 W HCPCS: Mod: HCNC | Performed by: NURSE ANESTHETIST, CERTIFIED REGISTERED

## 2025-03-06 PROCEDURE — 76937 US GUIDE VASCULAR ACCESS: CPT | Mod: 26,HCNC,, | Performed by: ANESTHESIOLOGY

## 2025-03-06 PROCEDURE — 93325 DOPPLER ECHO COLOR FLOW MAPG: CPT | Mod: 26,HCNC,, | Performed by: INTERNAL MEDICINE

## 2025-03-06 PROCEDURE — C1766 INTRO/SHEATH,STRBLE,NON-PEEL: HCPCS | Mod: HCNC | Performed by: INTERNAL MEDICINE

## 2025-03-06 PROCEDURE — C1730 CATH, EP, 19 OR FEW ELECT: HCPCS | Mod: HCNC | Performed by: INTERNAL MEDICINE

## 2025-03-06 PROCEDURE — 93657 TX L/R ATRIAL FIB ADDL: CPT | Mod: HCNC,,, | Performed by: INTERNAL MEDICINE

## 2025-03-06 PROCEDURE — 93010 ELECTROCARDIOGRAM REPORT: CPT | Mod: XE,HCNC,, | Performed by: INTERNAL MEDICINE

## 2025-03-06 PROCEDURE — 25000003 PHARM REV CODE 250: Mod: HCNC | Performed by: NURSE ANESTHETIST, CERTIFIED REGISTERED

## 2025-03-06 PROCEDURE — 93656 COMPRE EP EVAL ABLTJ ATR FIB: CPT | Mod: HCNC | Performed by: INTERNAL MEDICINE

## 2025-03-06 PROCEDURE — 63600175 PHARM REV CODE 636 W HCPCS: Mod: HCNC | Performed by: INTERNAL MEDICINE

## 2025-03-06 PROCEDURE — 93320 DOPPLER ECHO COMPLETE: CPT | Mod: HCNC

## 2025-03-06 PROCEDURE — 93010 ELECTROCARDIOGRAM REPORT: CPT | Mod: 76,XE,HCNC, | Performed by: INTERNAL MEDICINE

## 2025-03-06 PROCEDURE — C1894 INTRO/SHEATH, NON-LASER: HCPCS | Mod: HCNC | Performed by: INTERNAL MEDICINE

## 2025-03-06 PROCEDURE — 36620 INSERTION CATHETER ARTERY: CPT | Mod: 59,HCNC,, | Performed by: ANESTHESIOLOGY

## 2025-03-06 PROCEDURE — 37000009 HC ANESTHESIA EA ADD 15 MINS: Mod: HCNC | Performed by: INTERNAL MEDICINE

## 2025-03-06 PROCEDURE — 93005 ELECTROCARDIOGRAM TRACING: CPT | Mod: HCNC

## 2025-03-06 PROCEDURE — 37000008 HC ANESTHESIA 1ST 15 MINUTES: Mod: HCNC | Performed by: INTERNAL MEDICINE

## 2025-03-06 PROCEDURE — 27201037 HC PRESSURE MONITORING SET UP: Mod: HCNC

## 2025-03-06 RX ORDER — ROCURONIUM BROMIDE 10 MG/ML
INJECTION, SOLUTION INTRAVENOUS
Status: DISCONTINUED | OUTPATIENT
Start: 2025-03-06 | End: 2025-03-06

## 2025-03-06 RX ORDER — ATROPINE SULFATE 0.4 MG/ML
INJECTION, SOLUTION ENDOTRACHEAL; INTRAMEDULLARY; INTRAMUSCULAR; INTRAVENOUS; SUBCUTANEOUS
Status: DISCONTINUED | OUTPATIENT
Start: 2025-03-06 | End: 2025-03-06

## 2025-03-06 RX ORDER — PROPOFOL 10 MG/ML
VIAL (ML) INTRAVENOUS
Status: DISCONTINUED | OUTPATIENT
Start: 2025-03-06 | End: 2025-03-06

## 2025-03-06 RX ORDER — HEPARIN SODIUM 1000 [USP'U]/ML
INJECTION, SOLUTION INTRAVENOUS; SUBCUTANEOUS
Status: DISCONTINUED | OUTPATIENT
Start: 2025-03-06 | End: 2025-03-06

## 2025-03-06 RX ORDER — HALOPERIDOL 5 MG/ML
0.5 INJECTION INTRAMUSCULAR EVERY 10 MIN PRN
Status: DISCONTINUED | OUTPATIENT
Start: 2025-03-06 | End: 2025-03-06 | Stop reason: HOSPADM

## 2025-03-06 RX ORDER — PROCHLORPERAZINE EDISYLATE 5 MG/ML
5 INJECTION INTRAMUSCULAR; INTRAVENOUS EVERY 30 MIN PRN
Status: DISCONTINUED | OUTPATIENT
Start: 2025-03-06 | End: 2025-03-06 | Stop reason: HOSPADM

## 2025-03-06 RX ORDER — EPHEDRINE SULFATE 50 MG/ML
INJECTION, SOLUTION INTRAVENOUS
Status: DISCONTINUED | OUTPATIENT
Start: 2025-03-06 | End: 2025-03-06

## 2025-03-06 RX ORDER — FENTANYL CITRATE 50 UG/ML
INJECTION, SOLUTION INTRAMUSCULAR; INTRAVENOUS
Status: DISCONTINUED | OUTPATIENT
Start: 2025-03-06 | End: 2025-03-06

## 2025-03-06 RX ORDER — PHENYLEPHRINE HYDROCHLORIDE 10 MG/ML
INJECTION INTRAVENOUS
Status: DISCONTINUED | OUTPATIENT
Start: 2025-03-06 | End: 2025-03-06

## 2025-03-06 RX ORDER — LIDOCAINE HYDROCHLORIDE 20 MG/ML
INJECTION, SOLUTION EPIDURAL; INFILTRATION; INTRACAUDAL; PERINEURAL
Status: DISCONTINUED | OUTPATIENT
Start: 2025-03-06 | End: 2025-03-06

## 2025-03-06 RX ORDER — HEPARIN SOD,PORCINE/0.9 % NACL 1000/500ML
INTRAVENOUS SOLUTION INTRAVENOUS
Status: DISCONTINUED | OUTPATIENT
Start: 2025-03-06 | End: 2025-03-06 | Stop reason: HOSPADM

## 2025-03-06 RX ORDER — SODIUM CHLORIDE 0.9 % (FLUSH) 0.9 %
3 SYRINGE (ML) INJECTION
Status: DISCONTINUED | OUTPATIENT
Start: 2025-03-06 | End: 2025-03-06 | Stop reason: HOSPADM

## 2025-03-06 RX ORDER — GLUCAGON 1 MG
1 KIT INJECTION
Status: DISCONTINUED | OUTPATIENT
Start: 2025-03-06 | End: 2025-03-06 | Stop reason: HOSPADM

## 2025-03-06 RX ORDER — PROTAMINE SULFATE 10 MG/ML
INJECTION, SOLUTION INTRAVENOUS
Status: DISCONTINUED | OUTPATIENT
Start: 2025-03-06 | End: 2025-03-06

## 2025-03-06 RX ORDER — ACETAMINOPHEN 325 MG/1
650 TABLET ORAL EVERY 4 HOURS PRN
Status: DISCONTINUED | OUTPATIENT
Start: 2025-03-06 | End: 2025-03-06 | Stop reason: HOSPADM

## 2025-03-06 RX ORDER — LIDOCAINE HYDROCHLORIDE 20 MG/ML
INJECTION, SOLUTION INFILTRATION; PERINEURAL
Status: DISCONTINUED | OUTPATIENT
Start: 2025-03-06 | End: 2025-03-06 | Stop reason: HOSPADM

## 2025-03-06 RX ORDER — CEFAZOLIN 2 G/1
2 INJECTION, POWDER, FOR SOLUTION INTRAMUSCULAR; INTRAVENOUS
Status: DISCONTINUED | OUTPATIENT
Start: 2025-03-06 | End: 2025-03-06 | Stop reason: HOSPADM

## 2025-03-06 RX ORDER — HYDROMORPHONE HYDROCHLORIDE 1 MG/ML
0.2 INJECTION, SOLUTION INTRAMUSCULAR; INTRAVENOUS; SUBCUTANEOUS EVERY 5 MIN PRN
Status: DISCONTINUED | OUTPATIENT
Start: 2025-03-06 | End: 2025-03-06 | Stop reason: HOSPADM

## 2025-03-06 RX ORDER — ONDANSETRON HYDROCHLORIDE 2 MG/ML
INJECTION, SOLUTION INTRAVENOUS
Status: DISCONTINUED | OUTPATIENT
Start: 2025-03-06 | End: 2025-03-06

## 2025-03-06 RX ADMIN — EPHEDRINE SULFATE 10 MG: 50 INJECTION INTRAVENOUS at 10:03

## 2025-03-06 RX ADMIN — HEPARIN SODIUM 14000 UNITS: 1000 INJECTION, SOLUTION INTRAVENOUS; SUBCUTANEOUS at 11:03

## 2025-03-06 RX ADMIN — SODIUM CHLORIDE: 0.9 INJECTION, SOLUTION INTRAVENOUS at 09:03

## 2025-03-06 RX ADMIN — PROTAMINE SULFATE 10 MG: 10 INJECTION, SOLUTION INTRAVENOUS at 12:03

## 2025-03-06 RX ADMIN — ROCURONIUM BROMIDE 20 MG: 10 INJECTION INTRAVENOUS at 10:03

## 2025-03-06 RX ADMIN — EPHEDRINE SULFATE 20 MG: 50 INJECTION INTRAVENOUS at 10:03

## 2025-03-06 RX ADMIN — ONDANSETRON 4 MG: 2 INJECTION INTRAMUSCULAR; INTRAVENOUS at 12:03

## 2025-03-06 RX ADMIN — ROCURONIUM BROMIDE 30 MG: 10 INJECTION INTRAVENOUS at 11:03

## 2025-03-06 RX ADMIN — SUGAMMADEX 200 MG: 100 INJECTION, SOLUTION INTRAVENOUS at 12:03

## 2025-03-06 RX ADMIN — LIDOCAINE HYDROCHLORIDE 50 MG: 20 INJECTION, SOLUTION EPIDURAL; INFILTRATION; INTRACAUDAL; PERINEURAL at 10:03

## 2025-03-06 RX ADMIN — HEPARIN SODIUM 12 UNITS/KG/HR: 1000 INJECTION, SOLUTION INTRAVENOUS; SUBCUTANEOUS at 11:03

## 2025-03-06 RX ADMIN — FENTANYL CITRATE 100 MCG: 50 INJECTION, SOLUTION INTRAMUSCULAR; INTRAVENOUS at 12:03

## 2025-03-06 RX ADMIN — FENTANYL CITRATE 100 MCG: 50 INJECTION, SOLUTION INTRAMUSCULAR; INTRAVENOUS at 10:03

## 2025-03-06 RX ADMIN — PROPOFOL 160 MG: 10 INJECTION, EMULSION INTRAVENOUS at 10:03

## 2025-03-06 RX ADMIN — HEPARIN SODIUM 6000 UNITS: 1000 INJECTION, SOLUTION INTRAVENOUS; SUBCUTANEOUS at 11:03

## 2025-03-06 RX ADMIN — ROCURONIUM BROMIDE 50 MG: 10 INJECTION INTRAVENOUS at 10:03

## 2025-03-06 RX ADMIN — ROCURONIUM BROMIDE 20 MG: 10 INJECTION INTRAVENOUS at 12:03

## 2025-03-06 RX ADMIN — PHENYLEPHRINE HYDROCHLORIDE 100 MCG: 10 INJECTION INTRAVENOUS at 11:03

## 2025-03-06 RX ADMIN — PROPOFOL 30 MG: 10 INJECTION, EMULSION INTRAVENOUS at 11:03

## 2025-03-06 RX ADMIN — ATROPINE SULFATE 0.5 MG: 0.4 INJECTION, SOLUTION INTRAVENOUS at 11:03

## 2025-03-06 NOTE — NURSING
Received report from SCOUT Bueno. Patient s/p Ablation, AAOx4. VSS, no c/o pain or discomfort at this time, resp even and unlabored. Sutures/stopcock to bilateral groins are CDI. No active bleeding. No hematoma noted. Post procedure protocol reviewed with patient and patient's wife. Understanding verbalized. Wife at bedside. Nurse call bell within reach.

## 2025-03-06 NOTE — PROGRESS NOTES
Patient arrived to EP PACU in stable condition. Report received from procedural RN and anesthesia provider. Bilateral groin sites w/ fig 8 suture and stopcock CDI. Pt educated on groin sites and importance of keeping BLE still and straight. Continuous cardiac monitoring in place. Safety measures verified. PACU BCGs maintained. RN at bedside. Will continue to monitor.

## 2025-03-06 NOTE — DISCHARGE INSTRUCTIONS
"Medications:  Make sure to continue taking your blood thinner apixiban (trade name: Eliquis) after your procedure as you would normally take  You may experience chest discomfort (also known as "pericarditis") with deep breathes, coughing, and/or laying down which is typically normal following your procedure. If this occurs, you can take ibuprofen (Motrin) 800 mg every 8 hours for 2-3 days. If the chest pain is persistent or severe please visit the nearest emergency department.    Groin site management, precautions, and restrictions:  Remove the bandages over your groin area the morning after your procedure. You can shower after you remove these bandages. Keep the groin sites clean and dry. You do not need to apply ointments or bandages to the area.   If oozing from groin site occurs, apply pressure without letting up for 15 minutes and lay flat for 1 hour. If bleeding has resolved, you can continue to monitor. If the bleeding continues or there is significant swelling or pain in the groin area, please visit the nearest ER for evaluation and treatment. DO NOT STOP TAKING YOUR BLOOD THINNER UNLESS INSTRUCTED BY A PHYSICIAN.   Do not take baths or submerge your groin area or at least 1 week or when the puncture sites in your groin have completely healed  Do not lift anything over 10 lbs for the first week after your procedure, and avoid strenuous activity during this time to allow for the groin sites to heal. After 1 week, there are no activity restrictions.  Please contact the electrophysiology clinic or go to the ER if you experience: severe chest pain, shortness of breath, bleeding or swelling of the groin sites, or any other concerns.   "

## 2025-03-06 NOTE — ANESTHESIA PROCEDURE NOTES
Intubation    Date/Time: 3/6/2025 10:07 AM    Performed by: Zachariah Byrnes MD  Authorized by: Zachariah Byrnes MD    Intubation:     Induction:  Intravenous    Intubated:  Postinduction    Mask Ventilation:  Easy mask    Attempts:  1    Attempted By:  CRNA    Method of Intubation:  Direct    Blade:  [place] 2    Laryngeal View Grade: Grade I - full view of cords      Difficult Airway Encountered?: No      Complications:  None    Airway Device:  Oral endotracheal tube    Airway Device Size:  7.5    Style/Cuff Inflation:  Cuffed (inflated to minimal occlusive pressure)    Tube secured:  22    Secured at:  The teeth    Placement Verified By:  Capnometry    Complicating Factors:  None    Findings Post-Intubation:  BS equal bilateral and atraumatic/condition of teeth unchanged

## 2025-03-06 NOTE — BRIEF OP NOTE
"Atropine and heparin were administered after access was obtained and short sheaths were placed in femoral veins bilaterally.   ICE survey at the beginning of the case showed no significant pericardial effusion    Transseptal puncture performed with BRK needle and SL-1 sheath. Left atrial mapping performed through SL-1 sheath. After mapping was completed, the SL-1 sheath was removed over an Amplatz wire, and the Farapulse sheath was placed into the left atrium over the Amplatz wire. The dilator and wire were removed. The Farapulse catheter was placed through the sheath into the left atrium.  PVI was performed using a [employer] PFA catheter, with each vein receiving 4 applications in the basket configuration and 4 applications in the flow configuration. This resulted in acute isolation of all 4 pulmonary veins.  During PVI, we delivered "anchor" lesions at the posterior aspect of each vein, and then in the flower configuration we performed ablation of the entire posterior wall, resulting in posterior wall isolation. This was performed for CFAE ablation.  Following ablation, we repeated 3D electroanatomic mapping of the left atrium.  All 4 pulmonary veins and the posterior wall displayed entrance and exit block    Post-ablation rhythm was normal sinus rhythm  Post-ablation ICE assessment was then performed which did not show a pericardial effusion.  All sheaths removed. Figure of 8 suture placed in each groin to maintain hemostasis.    Carolyn Ruff MD PGY 8  Electrophysiology    "

## 2025-03-06 NOTE — TRANSFER OF CARE
Anesthesia Transfer of Care Note    Patient: Maegan Cleary    Procedure(s) Performed: Procedure(s) (LRB):  Ablation atrial fibrillation (N/A)  Transesophageal echo (RONNY) intra-procedure log documentation (N/A)  Cardioversion or Defibrillation    Patient location: Cath Lab    Anesthesia Type: general    Transport from OR: Transported from OR on 6-10 L/min O2 by face mask with adequate spontaneous ventilation    Post pain: adequate analgesia    Post assessment: no apparent anesthetic complications    Post vital signs: stable    Level of consciousness: sedated    Nausea/Vomiting: no nausea/vomiting    Complications: none    Transfer of care protocol was followed      Last vitals: Visit Vitals  /67 (BP Location: Right arm, Patient Position: Lying)   Pulse (!) 58   Temp 37.1 °C (98.8 °F) (Temporal)   Resp 18   Ht 6' (1.829 m)   Wt 91.6 kg (202 lb)   SpO2 96%   BMI 27.40 kg/m²      close supervision

## 2025-03-06 NOTE — ANESTHESIA PROCEDURE NOTES
Arterial    Diagnosis: atrial fibrillation    Patient location during procedure: done in OR    Staffing  Authorizing Provider: Zachariah Byrnes MD  Performing Provider: Zachariah Byrnes MD    Staffing  Performed by: Zachariah Byrnes MD  Authorized by: Zachariah Byrnes MD    Anesthesiologist was present at the time of the procedure.    Preanesthetic Checklist  Completed: patient identified, IV checked, site marked, risks and benefits discussed, surgical consent, monitors and equipment checked, pre-op evaluation, timeout performed and anesthesia consent givenArterial  Skin Prep: chlorhexidine gluconate  Local Infiltration: none  Orientation: right  Location: radial    Catheter Size: 20 G  Catheter placement by Ultrasound guidance. Heme positive aspiration all ports.   Vessel Caliber: large, patent, compressibility normal  Vascular Doppler:  not done  Needle advanced into vessel with real time Ultrasound guidance.  Guidewire confirmed in vessel.  Sterile sheath used.  Image recorded and saved.  Assessment  Dressing: secured with tape and tegaderm  Patient: Tolerated well

## 2025-03-06 NOTE — CONSULTS
[facility]  Cardiology  RONNY Consult Note    Patient Name: Maegan Cleary  MRN: [id number]  Admission Date: 3/6/2025  Hospital Length of Stay: 0 days  Code Status: No Order   Attending Provider: James Araya MD   Consulting Provider: Kate Brantley PA-C  Primary Care Physician: Leandro Carr MD  Principal Problem:<principal problem not specified>    Patient information was obtained from patient and past medical records.     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 ILR has revealed increasing AF burden despite increasing Bystolic. He presents today for RONNY and PVI.      TTE 12/30/24    Left Ventricle: The left ventricle is normal in size. Normal wall thickness. Normal wall motion. There is normal systolic function. Quantitated ejection fraction is 67%. Grade I diastolic dysfunction.    Right Ventricle: Right ventricular enlargement. Wall thickness is normal. Systolic function is normal.    Right Atrium: Right atrium is dilated. RA volume index is 42.4 mL/m2.    Tricuspid Valve: There is moderate regurgitation.    Aorta: Aortic root is normal in size measuring 3.45 cm. Ascending aorta is mildly dilated measuring 3.8 cm.    Pulmonary Artery: There is mild pulmonary hypertension. The estimated pulmonary artery systolic pressure is 41 mmHg.    Anticoagulant/antiplatelets: Eliquis 5 mg bid  ECG: Atrial fibrillation    Dysphagia or odynophagia:  No  Liver Disease, esophageal disease, or known varices:  No  Upper GI Bleeding: No  Snoring:  Yes  Sleep Apnea:  Yes  Prior neck surgery or radiation:  No  Able to move neck in all directions:  Yes  History of anesthetic difficulties:  No  Family history of anesthetic difficulties:  No  Last oral intake: yesterday before midnight  GLP-1 use: None    Platelet count: 189k  INR: 1.0      Past Medical History:   Diagnosis Date    Hyperlipidemia      Hypertension     Insomnia     CRIS (obstructive sleep apnea)     CRIS on CPAP     Vitamin D deficiency        Past Surgical History:   Procedure Laterality Date    BASAL CELL CARCINOMA EXCISION      cataract surgery      Colorectal Surgery  11/07/2019    Anal Sphincterectomy    INSERTION OF IMPLANTABLE LOOP RECORDER N/A 7/17/2020    Procedure: Insertion, Implantable Loop Recorder;  Surgeon: James Araya MD;  Location: [facility] EP LAB;  Service: Cardiology;  Laterality: N/A;  PAF, ILR, MDT, Local, SK, 3 Prep    INSERTION OF IMPLANTABLE LOOP RECORDER N/A 1/25/2024    Procedure: Insertion, Implantable Loop Recorder;  Surgeon: James Araya MD;  Location: [facility] EP LAB;  Service: Cardiology;  Laterality: N/A;    REMOVAL OF IMPLANTABLE LOOP RECORDER N/A 1/25/2024    Procedure: REMOVAL, IMPLANTABLE LOOP RECORDER;  Surgeon: James Araya MD;  Location: [facility] EP LAB;  Service: Cardiology;  Laterality: N/A;  REE, ILR remove and replace, BSthanh, RN Sedate, SK, 3 Prep *MDT ILR in situ*    TRANSFORAMINAL EPIDURAL INJECTION OF STEROID Right 8/26/2019    Procedure: INJECTION, STEROID, EPIDURAL, TRANSFORAMINAL APPROACH;  Surgeon: Maikel Millan MD;  Location: [facility] PAIN [facility]GT;  Service: Pain Management;  Laterality: Right;  RT TF JOSE MARIA L4-5, LF-S1  *add Monday per Dr Millan       Review of patient's allergies indicates:  No Known Allergies    Current Facility-Administered Medications on File Prior to Encounter   Medication    0.9%  NaCl infusion     Current Outpatient Medications on File Prior to Encounter   Medication Sig    apixaban (ELIQUIS) 5 mg Tab Take 1 tablet (5 mg total) by mouth 2 (two) times daily.    cholecalciferol, vitamin D3, (VITAMIN D3) 50 mcg (2,000 unit) Tab Take 1 tablet by mouth once daily.     co-enzyme Q-10 30 mg capsule Take 100 mg by mouth once daily.    cyanocobalamin 2000 MCG tablet Take 5,000 mcg by mouth once daily.     gabapentin (NEURONTIN) 300 MG capsule Take 1 capsule (300 mg total) by mouth 3 (three)  times daily as needed.    hydroCHLOROthiazide (HYDRODIURIL) 12.5 MG Tab Take 1 tablet (12.5 mg total) by mouth once daily.    hydrOXYzine pamoate (VISTARIL) 50 MG Cap TAKE 1 CAPSULE BY MOUTH IN THE EVENING    losartan (COZAAR) 50 MG tablet Take 1 tablet (50 mg total) by mouth once daily.    nebivoloL (BYSTOLIC) 10 MG Tab Take 1 tablet (10 mg total) by mouth 2 (two) times daily.    rosuvastatin (CRESTOR) 10 MG tablet Take 1 tablet (10 mg total) by mouth once daily.    SAW PALMETTO ORAL Take 1 tablet by mouth once daily.     Family History       Problem Relation (Age of Onset)    Atrial fibrillation Brother    Cancer Sister, Brother    Heart attack Brother    Hypertension Brother    No Known Problems Brother, Son, Son, Son    Prostate cancer Brother          Tobacco Use    Smoking status: Former     Types: Cigarettes    Smokeless tobacco: Never    Tobacco comments:     Stopped about 40 years ago   Substance and Sexual Activity    Alcohol use: No     Comment: Rarely, only on special occasions    Drug use: No    Sexual activity: Yes     Partners: Female     Review of Systems   Constitutional: Negative for diaphoresis, malaise/fatigue, weight gain and weight loss.   HENT:  Negative for nosebleeds.    Eyes:  Negative for vision loss in left eye, vision loss in right eye and visual disturbance.   Cardiovascular:  Negative for chest pain, claudication, dyspnea on exertion, irregular heartbeat, leg swelling, near-syncope, orthopnea, palpitations, paroxysmal nocturnal dyspnea and syncope.   Respiratory:  Negative for cough, shortness of breath, sleep disturbances due to breathing, snoring and wheezing.    Hematologic/Lymphatic: Negative for bleeding problem. Does not bruise/bleed easily.   Skin:  Negative for poor wound healing and rash.   Musculoskeletal:  Negative for muscle cramps and myalgias.   Gastrointestinal:  Negative for bloating, abdominal pain, diarrhea, heartburn, melena, nausea and vomiting.   Genitourinary:   Negative for hematuria and nocturia.   Neurological:  Negative for brief paralysis, dizziness, headaches, light-headedness, numbness and weakness.   Psychiatric/Behavioral:  Negative for depression.    Allergic/Immunologic: Negative for hives.     Objective:     Vital Signs (Most Recent):  Temp: 98.8 °F (37.1 °C) (03/06/25 0749)  Pulse: (!) 58 (03/06/25 0749)  Resp: 18 (03/06/25 0749)  BP: 133/67 (03/06/25 0750)  SpO2: 96 % (03/06/25 0749) Vital Signs (24h Range):  Temp:  [98.8 °F (37.1 °C)] 98.8 °F (37.1 °C)  Pulse:  [58] 58  Resp:  [18] 18  SpO2:  [96 %] 96 %  BP: (124-133)/(67) 133/67     Weight: 91.6 kg (202 lb)  Body mass index is 27.4 kg/m².    SpO2: 96 %       No intake or output data in the 24 hours ending 03/06/25 0905    Lines/Drains/Airways       Peripheral Intravenous Line  Duration                  Peripheral IV - Single Lumen 03/06/25 0800 20 G Right Antecubital <1 day         Peripheral IV - Single Lumen 03/06/25 0801 20 G Distal;Left;Posterior Forearm <1 day                     Physical Exam  Vitals and nursing note reviewed.   Constitutional:       Appearance: He is well-developed. He is not diaphoretic.   HENT:      Head: Normocephalic and atraumatic.   Eyes:      Conjunctiva/sclera: Conjunctivae normal.   Neck:      Vascular: No carotid bruit or JVD.   Cardiovascular:      Rate and Rhythm: Normal rate. Rhythm irregular.      Pulses: Normal pulses and intact distal pulses.      Heart sounds: Normal heart sounds. No murmur heard.     No friction rub. No gallop.   Pulmonary:      Effort: Pulmonary effort is normal.      Breath sounds: Normal breath sounds. No rales.   Chest:      Chest wall: No tenderness.   Abdominal:      General: There is no distension.      Palpations: Abdomen is soft. There is no mass.      Tenderness: There is no abdominal tenderness.   Skin:     General: Skin is warm and dry.      Coloration: Skin is not pale.   Neurological:      Mental Status: He is alert and oriented to  person, place, and time.          Significant Labs: All pertinent lab results from the last 24 hours have been reviewed.  Assessment and Plan:     Paroxysmal atrial fibrillation  Here today for RONNY to evaluate for KIKI/LA thrombus prior to PVI.   -No absolute contraindications of esophageal stricture, tumor, perforation, laceration,or diverticulum and/or active GI bleed  -The risks, benefits & alternatives of the procedure were explained to the patient.   -The risks of transesophageal echo include but are not limited to:  Dental trauma, esophageal trauma/perforation, bleeding, laryngospasm/brochospasm, aspiration, sore throat/hoarseness, & dislodgement of the endotracheal tube/nasogastric tube (where applicable).    -The risks of ANES monitored sedation include hypotension, respiratory depression, arrhythmias, bronchospasm, & death.    -Informed consent was obtained. The patient is agreeable to proceed with the procedure and all questions and concerns addressed.    Case discussed with an attending in echocardiography lab.    Further recommendations per attending addendum         VTE Risk Mitigation (From admission, onward)      None            Thank you for your consult. I will sign off. Please contact us if you have any additional questions.    Kate Brantley PA-C  Cardiology   [facility]

## 2025-03-06 NOTE — HOSPITAL COURSE
Patient underwent successful PFA-PVI with posterior wall isolation for treatment of atrial fibrillation. No evidence of intra- or post-procedure complications. Post-ablation ECG shows NSR, and no acute abnormalities.      EP medications at discharge:   Antiarrhythmics and/or AVN agents: unchanged.    Patient was instructed to continue their oral anticoagulation as previously prescribed   Patient was instructed to take ibuprofen 800 mg TID x 3 days for pericarditis.      Groin access sites without hematoma or bleeding. Activity restrictions given to patient. Instructed to seek medical attention for shortness of breath, chest discomfort not alleviated with NSAIDs, bleeding/hematoma formation at the access sites, acute onset of neurologic symptoms, N/V, or hematemesis. At discharge the patient denied CP, SOB, access site bleeding/hematoma, or any other complaints or evidence of complications.

## 2025-03-06 NOTE — ANESTHESIA POSTPROCEDURE EVALUATION
Anesthesia Post Evaluation    Patient: Maegan Cleary    Procedure(s) Performed: Procedure(s) (LRB):  Ablation atrial fibrillation (N/A)  Transesophageal echo (RONNY) intra-procedure log documentation (N/A)  Cardioversion or Defibrillation    Final Anesthesia Type: general      Patient location during evaluation: PACU  Patient participation: Yes- Able to Participate  Level of consciousness: awake and alert and oriented  Post-procedure vital signs: reviewed and stable  Pain management: adequate  Airway patency: patent  CRIS mitigation strategies: Intraoperative administration of CPAP, nasopharyngeal airway, or oral appliance during sedation, Multimodal analgesia, Extubation while patient is awake, Verification of full reversal of neuromuscular block and Extubation and recovery carried out in lateral, semiupright, or other nonsupine position  PONV status at discharge: No PONV  Anesthetic complications: no      Cardiovascular status: hemodynamically stable  Respiratory status: unassisted  Hydration status: euvolemic  Follow-up not needed.              Vitals Value Taken Time   /72 03/06/25 16:30   Temp 36.6 °C (97.9 °F) 03/06/25 14:37   Pulse 75 03/06/25 16:30   Resp 18 03/06/25 16:30   SpO2 96 % 03/06/25 16:30         No case tracking events are documented in the log.      Pain/Saji Score: Saji Score: 10 (3/6/2025  4:30 PM)

## 2025-03-06 NOTE — HPI
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 ILR has revealed increasing AF burden despite increasing Bystolic. He presents today for RONNY and PVI.      TTE 12/30/24    Left Ventricle: The left ventricle is normal in size. Normal wall thickness. Normal wall motion. There is normal systolic function. Quantitated ejection fraction is 67%. Grade I diastolic dysfunction.    Right Ventricle: Right ventricular enlargement. Wall thickness is normal. Systolic function is normal.    Right Atrium: Right atrium is dilated. RA volume index is 42.4 mL/m2.    Tricuspid Valve: There is moderate regurgitation.    Aorta: Aortic root is normal in size measuring 3.45 cm. Ascending aorta is mildly dilated measuring 3.8 cm.    Pulmonary Artery: There is mild pulmonary hypertension. The estimated pulmonary artery systolic pressure is 41 mmHg.    Anticoagulant/antiplatelets: Eliquis 5 mg bid  ECG: Atrial fibrillation    Dysphagia or odynophagia:  No  Liver Disease, esophageal disease, or known varices:  No  Upper GI Bleeding: No  Snoring:  Yes  Sleep Apnea:  Yes  Prior neck surgery or radiation:  No  Able to move neck in all directions:  Yes  History of anesthetic difficulties:  No  Family history of anesthetic difficulties:  No  Last oral intake: yesterday before midnight  GLP-1 use: None    Platelet count: 189k  INR: 1.0

## 2025-03-06 NOTE — NURSING
Patient discharged per MD orders. Instructions given on medications, wound care, activity, signs of infection, when to call MD, and follow up appointments. Pt and spouse verbalized understanding. Telemetry and PIV x2 removed. Patient transferred off of unit by PCT.

## 2025-03-06 NOTE — SUBJECTIVE & OBJECTIVE
Past Medical History:   Diagnosis Date    Hyperlipidemia     Hypertension     Insomnia     CRIS (obstructive sleep apnea)     CRIS on CPAP     Vitamin D deficiency        Past Surgical History:   Procedure Laterality Date    BASAL CELL CARCINOMA EXCISION      cataract surgery      Colorectal Surgery  11/07/2019    Anal Sphincterectomy    INSERTION OF IMPLANTABLE LOOP RECORDER N/A 7/17/2020    Procedure: Insertion, Implantable Loop Recorder;  Surgeon: James Araya MD;  Location: [facility] EP LAB;  Service: Cardiology;  Laterality: N/A;  PAF, ILR, MDT, Local, SK, 3 Prep    INSERTION OF IMPLANTABLE LOOP RECORDER N/A 1/25/2024    Procedure: Insertion, Implantable Loop Recorder;  Surgeon: James Araya MD;  Location: [facility] EP LAB;  Service: Cardiology;  Laterality: N/A;    REMOVAL OF IMPLANTABLE LOOP RECORDER N/A 1/25/2024    Procedure: REMOVAL, IMPLANTABLE LOOP RECORDER;  Surgeon: James Araya MD;  Location: [facility] EP LAB;  Service: Cardiology;  Laterality: N/A;  REE, ILR remove and replace, BSthanh, RN Sedate, SK, 3 Prep *MDT ILR in situ*    TRANSFORAMINAL EPIDURAL INJECTION OF STEROID Right 8/26/2019    Procedure: INJECTION, STEROID, EPIDURAL, TRANSFORAMINAL APPROACH;  Surgeon: Maikel Millan MD;  Location: [facility] PAIN MGT;  Service: Pain Management;  Laterality: Right;  RT TF JOSE MARIA L4-5, LF-S1  *add Monday per Dr Millan       Review of patient's allergies indicates:  No Known Allergies    Current Facility-Administered Medications on File Prior to Encounter   Medication    0.9%  NaCl infusion     Current Outpatient Medications on File Prior to Encounter   Medication Sig    apixaban (ELIQUIS) 5 mg Tab Take 1 tablet (5 mg total) by mouth 2 (two) times daily.    cholecalciferol, vitamin D3, (VITAMIN D3) 50 mcg (2,000 unit) Tab Take 1 tablet by mouth once daily.     co-enzyme Q-10 30 mg capsule Take 100 mg by mouth once daily.    cyanocobalamin 2000 MCG tablet Take 5,000 mcg by mouth once daily.     gabapentin (NEURONTIN) 300 MG  capsule Take 1 capsule (300 mg total) by mouth 3 (three) times daily as needed.    hydroCHLOROthiazide (HYDRODIURIL) 12.5 MG Tab Take 1 tablet (12.5 mg total) by mouth once daily.    hydrOXYzine pamoate (VISTARIL) 50 MG Cap TAKE 1 CAPSULE BY MOUTH IN THE EVENING    losartan (COZAAR) 50 MG tablet Take 1 tablet (50 mg total) by mouth once daily.    nebivoloL (BYSTOLIC) 10 MG Tab Take 1 tablet (10 mg total) by mouth 2 (two) times daily.    rosuvastatin (CRESTOR) 10 MG tablet Take 1 tablet (10 mg total) by mouth once daily.    SAW PALMETTO ORAL Take 1 tablet by mouth once daily.     Family History       Problem Relation (Age of Onset)    Atrial fibrillation Brother    Cancer Sister, Brother    Heart attack Brother    Hypertension Brother    No Known Problems Brother, Son, Son, Son    Prostate cancer Brother          Tobacco Use    Smoking status: Former     Types: Cigarettes    Smokeless tobacco: Never    Tobacco comments:     Stopped about 40 years ago   Substance and Sexual Activity    Alcohol use: No     Comment: Rarely, only on special occasions    Drug use: No    Sexual activity: Yes     Partners: Female     Review of Systems   Constitutional: Negative for diaphoresis, malaise/fatigue, weight gain and weight loss.   HENT:  Negative for nosebleeds.    Eyes:  Negative for vision loss in left eye, vision loss in right eye and visual disturbance.   Cardiovascular:  Negative for chest pain, claudication, dyspnea on exertion, irregular heartbeat, leg swelling, near-syncope, orthopnea, palpitations, paroxysmal nocturnal dyspnea and syncope.   Respiratory:  Negative for cough, shortness of breath, sleep disturbances due to breathing, snoring and wheezing.    Hematologic/Lymphatic: Negative for bleeding problem. Does not bruise/bleed easily.   Skin:  Negative for poor wound healing and rash.   Musculoskeletal:  Negative for muscle cramps and myalgias.   Gastrointestinal:  Negative for bloating, abdominal pain, diarrhea,  heartburn, melena, nausea and vomiting.   Genitourinary:  Negative for hematuria and nocturia.   Neurological:  Negative for brief paralysis, dizziness, headaches, light-headedness, numbness and weakness.   Psychiatric/Behavioral:  Negative for depression.    Allergic/Immunologic: Negative for hives.     Objective:     Vital Signs (Most Recent):  Temp: 98.8 °F (37.1 °C) (03/06/25 0749)  Pulse: (!) 58 (03/06/25 0749)  Resp: 18 (03/06/25 0749)  BP: 133/67 (03/06/25 0750)  SpO2: 96 % (03/06/25 0749) Vital Signs (24h Range):  Temp:  [98.8 °F (37.1 °C)] 98.8 °F (37.1 °C)  Pulse:  [58] 58  Resp:  [18] 18  SpO2:  [96 %] 96 %  BP: (124-133)/(67) 133/67     Weight: 91.6 kg (202 lb)  Body mass index is 27.4 kg/m².    SpO2: 96 %       No intake or output data in the 24 hours ending 03/06/25 0905    Lines/Drains/Airways       Peripheral Intravenous Line  Duration                  Peripheral IV - Single Lumen 03/06/25 0800 20 G Right Antecubital <1 day         Peripheral IV - Single Lumen 03/06/25 0801 20 G Distal;Left;Posterior Forearm <1 day                     Physical Exam  Vitals and nursing note reviewed.   Constitutional:       Appearance: He is well-developed. He is not diaphoretic.   HENT:      Head: Normocephalic and atraumatic.   Eyes:      Conjunctiva/sclera: Conjunctivae normal.   Neck:      Vascular: No carotid bruit or JVD.   Cardiovascular:      Rate and Rhythm: Normal rate. Rhythm irregular.      Pulses: Normal pulses and intact distal pulses.      Heart sounds: Normal heart sounds. No murmur heard.     No friction rub. No gallop.   Pulmonary:      Effort: Pulmonary effort is normal.      Breath sounds: Normal breath sounds. No rales.   Chest:      Chest wall: No tenderness.   Abdominal:      General: There is no distension.      Palpations: Abdomen is soft. There is no mass.      Tenderness: There is no abdominal tenderness.   Skin:     General: Skin is warm and dry.      Coloration: Skin is not pale.    Neurological:      Mental Status: He is alert and oriented to person, place, and time.          Significant Labs: All pertinent lab results from the last 24 hours have been reviewed.

## 2025-03-06 NOTE — NURSING
Stopped holding pressure. Dr. Ruff at bedside to assess site. No further instructions. Site dressed with gauze and tegaderm. No active bleeding or hematoma noted. VSS.

## 2025-03-06 NOTE — ASSESSMENT & PLAN NOTE
Here today for RONNY to evaluate for KIKI/LA thrombus prior to PVI.   -No absolute contraindications of esophageal stricture, tumor, perforation, laceration,or diverticulum and/or active GI bleed  -The risks, benefits & alternatives of the procedure were explained to the patient.   -The risks of transesophageal echo include but are not limited to:  Dental trauma, esophageal trauma/perforation, bleeding, laryngospasm/brochospasm, aspiration, sore throat/hoarseness, & dislodgement of the endotracheal tube/nasogastric tube (where applicable).    -The risks of ANES monitored sedation include hypotension, respiratory depression, arrhythmias, bronchospasm, & death.    -Informed consent was obtained. The patient is agreeable to proceed with the procedure and all questions and concerns addressed.    Case discussed with an attending in echocardiography lab.    Further recommendations per attending addendum

## 2025-03-06 NOTE — ANESTHESIA PREPROCEDURE EVALUATION
03/06/2025  Maegan Cleary is a 79 y.o., male for a fib ablation.     Patient Active Problem List   Diagnosis    Essential hypertension    Hyperlipidemia    Insomnia    CRIS on CPAP    Premature beats    Pulmonary nodule    Decreased ROM of lumbar spine    Acute midline low back pain without sciatica    Spinal stenosis of lumbar region with neurogenic claudication    DDD (degenerative disc disease), lumbar    Spondylosis without myelopathy    Trochanteric bursitis of right hip    Overweight (BMI 25.0-29.9)    Palpitations    High priority for 2019 novel coronavirus vaccination    Lump of skin of upper extremity, right    Benign prostatic hyperplasia    Coronary artery calcification    Encounter for loop recorder at end of battery life    Paroxysmal atrial fibrillation           Pre-op Assessment    I have reviewed the Patient Summary Reports.     I have reviewed the Nursing Notes. I have reviewed the NPO Status.   I have reviewed the Medications.     Review of Systems  Cardiovascular:     Hypertension   CAD                                          Musculoskeletal:  Arthritis                   Physical Exam  General: Well nourished    Airway:  Mallampati: I / I  Mouth Opening: Normal  TM Distance: Normal  Tongue: Normal  Neck ROM: Normal ROM    Dental:  Intact        Anesthesia Plan  Type of Anesthesia, risks & benefits discussed:    Anesthesia Type: Gen ETT  Intra-op Monitoring Plan: Standard ASA Monitors and Art Line  Post Op Pain Control Plan: multimodal analgesia  Induction:  IV  Airway Plan: Direct, Post-Induction  Informed Consent: Informed consent signed with the Patient and all parties understand the risks and agree with anesthesia plan.  All questions answered. Patient consented to blood products? Yes  ASA Score: 3  Day of Surgery Review of History & Physical: H&P Update referred to the  surgeon/provider.    Ready For Surgery From Anesthesia Perspective.     .    Lab Results   Component Value Date    WBC 6.84 02/27/2025    HGB 15.4 02/27/2025    HCT 46.6 02/27/2025    MCV 95 02/27/2025     02/27/2025       BMP  Lab Results   Component Value Date     02/27/2025    K 4.5 02/27/2025     02/27/2025    CO2 28 02/27/2025    BUN 14 02/27/2025    CREATININE 1.0 02/27/2025    CALCIUM 9.0 02/27/2025    ANIONGAP 7 (L) 02/27/2025    EGFRNORACEVR >60.0 02/27/2025     Results for orders placed during the hospital encounter of 12/30/24    Echo    Interpretation Summary    Left Ventricle: The left ventricle is normal in size. Normal wall thickness. Normal wall motion. There is normal systolic function. Quantitated ejection fraction is 67%. Grade I diastolic dysfunction.    Right Ventricle: Right ventricular enlargement. Wall thickness is normal. Systolic function is normal.    Right Atrium: Right atrium is dilated. RA volume index is 42.4 mL/m2.    Tricuspid Valve: There is moderate regurgitation.    Aorta: Aortic root is normal in size measuring 3.45 cm. Ascending aorta is mildly dilated measuring 3.8 cm.    Pulmonary Artery: There is mild pulmonary hypertension. The estimated pulmonary artery systolic pressure is 41 mmHg.

## 2025-03-06 NOTE — PROGRESS NOTES
Nursing Transfer Note        Reason patient is being transferred: back to short stay post recovery    Transfer To: short stay 1    Transfer via stretcher    Transfer with cardiac monitoring    Transported by PCT    Telemetry: Box Number [id number], Rate 68, and Rhythm NSR    Medicines sent: none    Any special needs or follow-up needed: continue bedrest with frequent checks    Patient belongings transferred with patient: No    Chart send with patient: Yes    Notified: spouse on arrival to room    Patient reassessed at: to be done by receiving RN (date, time)

## 2025-03-06 NOTE — NURSING
Left suture/stopcock removed per protocol. Pt tolerated well. Left groin site dressed with gauze and tegaderm CDI. No hematoma noted. No bleeding noted.

## 2025-03-06 NOTE — H&P
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 Patient with history of ILE implanted in 7/17/10 for palpitations, and bystolic was initiated. He had  increasing AF, despite increasing Bystolic. ILR reviewed, reveals AF up to 39 hours, last episode 1/9/24        Today, patient doing well without any complains. Reports compliance with medication       Presenting EKG: atrial fibrillation   CHADS-VASc: 3  Anticoagulants: apixaban   Antiarrhythmics: none  LV EF: 67%  Pertinent labs:  hgb 15.4 plts 189 cr1.0        Medications - Outpatient  Prior to Admission medications    Medication Sig Start Date End Date Taking? Authorizing Provider   apixaban (ELIQUIS) 5 mg Tab Take 1 tablet (5 mg total) by mouth 2 (two) times daily. 12/20/24  Yes James Araya MD   cholecalciferol, vitamin D3, (VITAMIN D3) 50 mcg (2,000 unit) Tab Take 1 tablet by mouth once daily.  1/1/12  Yes Provider, Historical   co-enzyme Q-10 30 mg capsule Take 100 mg by mouth once daily.   Yes Provider, Historical   cyanocobalamin 2000 MCG tablet Take 5,000 mcg by mouth once daily.  1/1/10  Yes Provider, Historical   gabapentin (NEURONTIN) 300 MG capsule Take 1 capsule (300 mg total) by mouth 3 (three) times daily as needed. 3/28/24  Yes Mariann Estrada MD   hydroCHLOROthiazide (HYDRODIURIL) 12.5 MG Tab Take 1 tablet (12.5 mg total) by mouth once daily. 4/4/24  Yes Leandro Carr MD   hydrOXYzine pamoate (VISTARIL) 50 MG Cap TAKE 1 CAPSULE BY MOUTH IN THE EVENING 11/15/23  Yes Leandro Carr MD   losartan (COZAAR) 50 MG tablet Take 1 tablet (50 mg total) by mouth once daily. 4/4/24  Yes Leandro Carr MD   nebivoloL (BYSTOLIC) 10 MG Tab Take 1 tablet (10 mg total) by mouth 2  (two) times daily. 12/23/24  Yes James Araya MD   rosuvastatin (CRESTOR) 10 MG tablet Take 1 tablet (10 mg total) by mouth once daily. 4/4/24 3/6/25 Yes Leandro Carr MD   SAW PALMETTO ORAL Take 1 tablet by mouth once daily.   Yes Provider, Historical         Medications - Current  Scheduled Meds:  Continuous Infusions:  PRN Meds:.  Current Facility-Administered Medications:     ceFAZolin (Ancef) IV (PEDS and ADULTS), 2 g, Intravenous, On Call Procedure      Allergies  Review of patient's allergies indicates:  No Known Allergies      Past Medical History  Past Medical History:   Diagnosis Date    Hyperlipidemia     Hypertension     Insomnia     CRIS (obstructive sleep apnea)     CRIS on CPAP     Vitamin D deficiency          Past Surgical History  Past Surgical History:   Procedure Laterality Date    BASAL CELL CARCINOMA EXCISION      cataract surgery      Colorectal Surgery  11/07/2019    Anal Sphincterectomy    INSERTION OF IMPLANTABLE LOOP RECORDER N/A 7/17/2020    Procedure: Insertion, Implantable Loop Recorder;  Surgeon: James Araya MD;  Location: [facility] EP LAB;  Service: Cardiology;  Laterality: N/A;  PAF, ILR, MDT, Local, SK, 3 Prep    INSERTION OF IMPLANTABLE LOOP RECORDER N/A 1/25/2024    Procedure: Insertion, Implantable Loop Recorder;  Surgeon: James Araya MD;  Location: [facility] EP LAB;  Service: Cardiology;  Laterality: N/A;    REMOVAL OF IMPLANTABLE LOOP RECORDER N/A 1/25/2024    Procedure: REMOVAL, IMPLANTABLE LOOP RECORDER;  Surgeon: James Araya MD;  Location: [facility] EP LAB;  Service: Cardiology;  Laterality: N/A;  REE, ILR remove and replace, BSthanh, RN Sedate, SK, 3 Prep *MDT ILR in situ*    TRANSFORAMINAL EPIDURAL INJECTION OF STEROID Right 8/26/2019    Procedure: INJECTION, STEROID, EPIDURAL, TRANSFORAMINAL APPROACH;  Surgeon: Maikel Millan MD;  Location: [facility] [place] PAIN T;  Service: Pain Management;  Laterality: Right;  RT TF JOSE MARIA L4-5, LF-S1  *add Monday per Dr Millan         Social  History  Social History     Socioeconomic History    Marital status:    Occupational History    Occupation: primary care physician, [employer]   Tobacco Use    Smoking status: Former     Types: Cigarettes    Smokeless tobacco: Never    Tobacco comments:     Stopped about 40 years ago   Substance and Sexual Activity    Alcohol use: No     Comment: Rarely, only on special occasions    Drug use: No    Sexual activity: Yes     Partners: Female   Social History Narrative    Regular exercise .  Healthy diet     Social Drivers of Health     Financial Resource Strain: Low Risk  (10/3/2024)    Overall Financial Resource Strain (CARDIA)     Difficulty of Paying Living Expenses: Not hard at all   Food Insecurity: No Food Insecurity (10/3/2024)    Hunger Vital Sign     Worried About Running Out of Food in the Last Year: Never true     Ran Out of Food in the Last Year: Never true   Transportation Needs: No Transportation Needs (10/3/2024)    PRAPARE - Transportation     Lack of Transportation (Medical): No     Lack of Transportation (Non-Medical): No   Physical Activity: Sufficiently Active (10/3/2024)    Exercise Vital Sign     Days of Exercise per Week: 5 days     Minutes of Exercise per Session: 40 min   Stress: No Stress Concern Present (10/3/2024)    [employer] - Occupational Stress Questionnaire     Feeling of Stress : Not at all   Housing Stability: Unknown (10/3/2024)    Housing Stability Vital Sign     Unable to Pay for Housing in the Last Year: No     Homeless in the Last Year: No         ROS  10 point ROS performed and negative except as stated in HPI     Physical Examination         Vital Signs  Vitals  Temp: 98.8 °F (37.1 °C)  Temp Source: Temporal  Pulse: (!) 58  Heart Rate Source: SpO2  Resp: 18  SpO2: 96 %  BP: 133/67  MAP (mmHg): 92  BP Location: Right arm  BP Method: Automatic  Patient Position: Lying          24 Hour VS Range    Temp:  [98.8 °F (37.1 °C)]   Pulse:   "[58]   Resp:  [18]   BP: (124-133)/(67)   SpO2:  [96 %]   No intake or output data in the 24 hours ending 03/06/25 0907        Physical Exam:   Constitutional: no acute distress  HEENT: NCAT, EOMI, no scleral icterus  Cardiovascular: Regular rate and rhythm  Pulmonary: Normal respiratory effort   Abdomen: nontender, non-distended   Neuro: alert and oriented, no focal deficits  Extremities: warm, no edema   MSK: no deformities  Integument: intact, no rashes       Data     Echo 12/30/24     Left Ventricle: The left ventricle is normal in size. Normal wall thickness. Normal wall motion. There is normal systolic function. Quantitated ejection fraction is 67%. Grade I diastolic dysfunction.    Right Ventricle: Right ventricular enlargement. Wall thickness is normal. Systolic function is normal.    Right Atrium: Right atrium is dilated. RA volume index is 42.4 mL/m2.    Tricuspid Valve: There is moderate regurgitation.    Aorta: Aortic root is normal in size measuring 3.45 cm. Ascending aorta is mildly dilated measuring 3.8 cm.    Pulmonary Artery: There is mild pulmonary hypertension. The estimated pulmonary artery systolic pressure is 41      BNP (pg/mL)   Date Value   02/17/2019 203 (H)       No results for input(s): "LABBLOO" in the last 168 hours.         Assessment & Plan   Patient is a 78 yo male with increasing atrial fibrillation, despite increasing beta blocker.  Discussed options at length, including anti-arrhythmic therapy vs PVI (PFA).  Consents signed at the time of last clinic visit. Risks and benefits of PVI with PFA discussed, including posterior wall isolation >> he would like to proceed.      Valentine PALOMINO MD  [facility]   Cardiovascular Disease   "

## 2025-03-06 NOTE — NURSING
Right suture/stopcock removed per protocol. Pt tolerated well. Right groin site dressed with gauze and tegaderm CDI. Quarter sized hematoma noted. No bleeding noted. Pressure being held. Pt has no c/o pain. VSS.

## 2025-03-06 NOTE — NURSING
Pt ambulated around unit and to restroom. Pt voided. Tolerated walk well. Vital signs remain stable. No c/o pain or discomfort at this time, resp even and unlabored. Gauze/tegaderm dressing to bilateral groin sites are CDI. No active bleeding. No hematoma noted.

## 2025-03-06 NOTE — DISCHARGE SUMMARY
[facility] - Short Stay Cardiac Unit  Cardiac Electrophysiology  Discharge Summary      Patient Name: Maegan Cleary  MRN: [id number]  Admission Date: 3/6/2025  Hospital Length of Stay: 0 days  Discharge Date and Time:  03/06/2025 3:29 PM  Attending Physician: James Araya MD    Discharging Provider: Carolyn Ruff MD  Primary Care Physician: Leandro Carr MD    HPI:   Maegan Cleary is a 79 y.o. male with atrial fibrillation, palpitations, Htn, sleep apnea who presents for PVI with PFA.     Background:  Noted increasing palpitations.  ILR implanted 7/17/10.  ILR has revealed increasing AF, despite increasing Bystolic.  Echo 12/30/24: 67%    ECG:       Procedure(s) (LRB):  Ablation atrial fibrillation (N/A)  Transesophageal echo (RONNY) intra-procedure log documentation (N/A)  Cardioversion or Defibrillation     Indwelling Lines/Drains at time of discharge:  Lines/Drains/Airways       None                   Hospital Course:  Patient underwent successful PFA-PVI with posterior wall isolation for treatment of atrial fibrillation. No evidence of intra- or post-procedure complications. Post-ablation ECG shows NSR, and no acute abnormalities.      EP medications at discharge:   Antiarrhythmics and/or AVN agents: unchanged.    Patient was instructed to continue their oral anticoagulation as previously prescribed   Patient was instructed to take ibuprofen 800 mg TID x 3 days for pericarditis.      Groin access sites without hematoma or bleeding. Activity restrictions given to patient. Instructed to seek medical attention for shortness of breath, chest discomfort not alleviated with NSAIDs, bleeding/hematoma formation at the access sites, acute onset of neurologic symptoms, N/V, or hematemesis. At discharge the patient denied CP, SOB, access site bleeding/hematoma, or any other complaints or evidence of complications.         Goals of Care Treatment Preferences:         Consults:     Significant Diagnostic Studies:  N/A    Pending Diagnostic Studies:       None            Final Active Diagnoses:    Diagnosis Date Noted POA    PRINCIPAL PROBLEM:  Paroxysmal atrial fibrillation [I48.0] 01/10/2025 Yes      Problems Resolved During this Admission:     Cardiac/Vascular  * Paroxysmal atrial fibrillation  In summary, Mr. Cleary is a 78yo male with an increasing burden of symptomatic atrial fibrillation here for PVI with PFA. EF 12/2024 normal. On Eliquis 5mg bid for stroke prevention. Also on Bystolic.    RONNY, cancel if NSR.    Our discussion included, but was not limited to the risk of death, infection, bleeding, stroke, MI, cardiac perforation, embolism, cardiac tamponade, vascular injury, valvular injury, AE fistula (RFA), injury to phrenic nerve (RFA), pulmonary vein stenosis (RFA), rhabdomyolysis (PFA), hemolysis (PFA) and other organic injury including the possibility for need for surgery or pacemaker implantation. Patient verbalized understanding and wishes to proceed. All questions and concerns were answered. Consents signed.           Discharged Condition: stable    Disposition:     Follow Up:   Follow-up Information       James Araya MD Follow up in 3 month(s).    Specialties: Electrophysiology, Cardiology  Contact information:  [facility] [address]  [phone]                           Patient Instructions:   No discharge procedures on file.  Medications:  Reconciled Home Medications:      Medication List        CONTINUE taking these medications      apixaban 5 mg Tab  Commonly known as: ELIQUIS  Take 1 tablet (5 mg total) by mouth 2 (two) times daily.     cholecalciferol (vitamin D3) 50 mcg (2,000 unit) Tab  Commonly known as: VITAMIN D3  Take 1 tablet by mouth once daily.     co-enzyme Q-10 30 mg capsule  Take 100 mg by mouth once daily.     cyanocobalamin 2000 MCG tablet  Take 5,000 mcg by mouth once daily.     gabapentin 300 MG capsule  Commonly known as: NEURONTIN  Take 1 capsule (300 mg total)  by mouth 3 (three) times daily as needed.     hydroCHLOROthiazide 12.5 MG Tab  Take 1 tablet (12.5 mg total) by mouth once daily.     hydrOXYzine pamoate 50 MG Cap  Commonly known as: VISTARIL  TAKE 1 CAPSULE BY MOUTH IN THE EVENING     losartan 50 MG tablet  Commonly known as: COZAAR  Take 1 tablet (50 mg total) by mouth once daily.     nebivoloL 10 MG Tab  Commonly known as: BYSTOLIC  Take 1 tablet (10 mg total) by mouth 2 (two) times daily.     rosuvastatin 10 MG tablet  Commonly known as: CRESTOR  Take 1 tablet (10 mg total) by mouth once daily.     SAW PALMETTO ORAL  Take 1 tablet by mouth once daily.              Time spent on the discharge of patient: 45 minutes    Carolyn Ruff MD  Cardiac Electrophysiology  [facility] - Short Stay Cardiac Unit

## 2025-03-17 ENCOUNTER — CLINICAL SUPPORT (OUTPATIENT)
Dept: CARDIOLOGY | Facility: HOSPITAL | Age: 80
End: 2025-03-17
Attending: INTERNAL MEDICINE
Payer: MEDICARE

## 2025-03-17 ENCOUNTER — CLINICAL SUPPORT (OUTPATIENT)
Dept: CARDIOLOGY | Facility: HOSPITAL | Age: 80
End: 2025-03-17
Payer: MEDICARE

## 2025-03-17 DIAGNOSIS — I49.8 OTHER SPECIFIED CARDIAC ARRHYTHMIAS: ICD-10-CM

## 2025-03-17 PROCEDURE — 93298 REM INTERROG DEV EVAL SCRMS: CPT | Mod: HCNC | Performed by: INTERNAL MEDICINE

## 2025-03-17 PROCEDURE — 93298 REM INTERROG DEV EVAL SCRMS: CPT | Mod: 26,HCNC,, | Performed by: INTERNAL MEDICINE

## 2025-03-21 LAB
OHS CV AF BURDEN PERCENT: < 1
OHS CV DC REMOTE DEVICE TYPE: NORMAL

## 2025-04-06 ENCOUNTER — PATIENT MESSAGE (OUTPATIENT)
Dept: INTERNAL MEDICINE | Facility: CLINIC | Age: 80
End: 2025-04-06
Payer: MEDICARE

## 2025-04-06 DIAGNOSIS — M54.50 CHRONIC LOW BACK PAIN, UNSPECIFIED BACK PAIN LATERALITY, UNSPECIFIED WHETHER SCIATICA PRESENT: Primary | ICD-10-CM

## 2025-04-06 DIAGNOSIS — G89.29 CHRONIC LOW BACK PAIN, UNSPECIFIED BACK PAIN LATERALITY, UNSPECIFIED WHETHER SCIATICA PRESENT: Primary | ICD-10-CM

## 2025-04-06 DIAGNOSIS — I25.10 CORONARY ARTERY CALCIFICATION: ICD-10-CM

## 2025-04-06 NOTE — TELEPHONE ENCOUNTER
Care Due:                  Date            Visit Type   Department     Provider  --------------------------------------------------------------------------------                                EP -                              PRIMARY      [facility] INTERNAL  Last Visit: 04-      CARE ([facility])   MEDICINE       Leandrolevar Carr                              EP -                              PRIMARY      [facility] INTERNAL  Next Visit: 05-      [facility] ([facility])   MEDICINE       Leandro RICHELLE Carr                                                            Last  Test          Frequency    Reason                     Performed    Due Date  --------------------------------------------------------------------------------    CMP.........  12 months..  rosuvastatin.............  04- 03-    Lipid Panel.  12 months..  rosuvastatin.............  04- 03-    [employer] Embedded Care Due Messages. Reference number: [id number].   4/06/2025 9:06:27 AM CDT

## 2025-04-06 NOTE — TELEPHONE ENCOUNTER
No care due was identified.  [facility] Embedded Care Due Messages. Reference number: [id number].   4/06/2025 9:06:49 AM CDT

## 2025-04-08 RX ORDER — HYDROCHLOROTHIAZIDE 12.5 MG/1
12.5 TABLET ORAL DAILY
Qty: 90 TABLET | Refills: 0 | Status: SHIPPED | OUTPATIENT
Start: 2025-04-08

## 2025-04-08 RX ORDER — GABAPENTIN 300 MG/1
300 CAPSULE ORAL 3 TIMES DAILY PRN
Qty: 270 CAPSULE | Refills: 1 | Status: SHIPPED | OUTPATIENT
Start: 2025-04-08

## 2025-04-08 RX ORDER — LOSARTAN POTASSIUM 50 MG/1
50 TABLET ORAL DAILY
Qty: 90 TABLET | Refills: 0 | Status: SHIPPED | OUTPATIENT
Start: 2025-04-08

## 2025-04-08 RX ORDER — ROSUVASTATIN CALCIUM 10 MG/1
10 TABLET, COATED ORAL DAILY
Qty: 90 TABLET | Refills: 0 | Status: SHIPPED | OUTPATIENT
Start: 2025-04-08 | End: 2026-01-03

## 2025-04-08 RX ORDER — HYDROXYZINE PAMOATE 50 MG/1
CAPSULE ORAL
Qty: 90 CAPSULE | Refills: 0 | Status: SHIPPED | OUTPATIENT
Start: 2025-04-08

## 2025-04-20 ENCOUNTER — CLINICAL SUPPORT (OUTPATIENT)
Dept: CARDIOLOGY | Facility: HOSPITAL | Age: 80
End: 2025-04-20
Attending: INTERNAL MEDICINE
Payer: MEDICARE

## 2025-04-20 ENCOUNTER — CLINICAL SUPPORT (OUTPATIENT)
Dept: CARDIOLOGY | Facility: HOSPITAL | Age: 80
End: 2025-04-20
Payer: MEDICARE

## 2025-04-20 DIAGNOSIS — I49.8 OTHER SPECIFIED CARDIAC ARRHYTHMIAS: ICD-10-CM

## 2025-04-20 PROCEDURE — 93298 REM INTERROG DEV EVAL SCRMS: CPT | Mod: 26,,, | Performed by: INTERNAL MEDICINE

## 2025-04-20 PROCEDURE — 93298 REM INTERROG DEV EVAL SCRMS: CPT | Performed by: INTERNAL MEDICINE

## 2025-04-24 ENCOUNTER — LAB VISIT (OUTPATIENT)
Dept: LAB | Facility: HOSPITAL | Age: 80
End: 2025-04-24
Attending: INTERNAL MEDICINE
Payer: MEDICARE

## 2025-04-24 DIAGNOSIS — R79.9 ABNORMAL FINDING OF BLOOD CHEMISTRY, UNSPECIFIED: ICD-10-CM

## 2025-04-24 DIAGNOSIS — I10 ESSENTIAL HYPERTENSION: ICD-10-CM

## 2025-04-24 DIAGNOSIS — Z12.5 ENCOUNTER FOR SCREENING FOR MALIGNANT NEOPLASM OF PROSTATE: ICD-10-CM

## 2025-04-24 DIAGNOSIS — Z00.00 WELL ADULT EXAM: ICD-10-CM

## 2025-04-24 LAB
ABSOLUTE EOSINOPHIL (OHS): 0.06 K/UL
ABSOLUTE MONOCYTE (OHS): 0.41 K/UL (ref 0.3–1)
ABSOLUTE NEUTROPHIL COUNT (OHS): 3.28 K/UL (ref 1.8–7.7)
ALBUMIN SERPL BCP-MCNC: 4 G/DL (ref 3.5–5.2)
ALP SERPL-CCNC: 68 UNIT/L (ref 40–150)
ALT SERPL W/O P-5'-P-CCNC: 21 UNIT/L (ref 10–44)
ANION GAP (OHS): 10 MMOL/L (ref 8–16)
AST SERPL-CCNC: 27 UNIT/L (ref 11–45)
BASOPHILS # BLD AUTO: 0.01 K/UL
BASOPHILS NFR BLD AUTO: 0.1 %
BILIRUB SERPL-MCNC: 0.9 MG/DL (ref 0.1–1)
BILIRUB UR QL STRIP.AUTO: NEGATIVE
BUN SERPL-MCNC: 14 MG/DL (ref 8–23)
CALCIUM SERPL-MCNC: 9.8 MG/DL (ref 8.7–10.5)
CHLORIDE SERPL-SCNC: 103 MMOL/L (ref 95–110)
CHOLEST SERPL-MCNC: 121 MG/DL (ref 120–199)
CHOLEST/HDLC SERPL: 3.8 {RATIO} (ref 2–5)
CLARITY UR: CLEAR
CO2 SERPL-SCNC: 29 MMOL/L (ref 23–29)
COLOR UR AUTO: YELLOW
CREAT SERPL-MCNC: 1 MG/DL (ref 0.5–1.4)
EAG (OHS): 97 MG/DL (ref 68–131)
ERYTHROCYTE [DISTWIDTH] IN BLOOD BY AUTOMATED COUNT: 13.3 % (ref 11.5–14.5)
GFR SERPLBLD CREATININE-BSD FMLA CKD-EPI: >60 ML/MIN/1.73/M2
GLUCOSE SERPL-MCNC: 99 MG/DL (ref 70–110)
GLUCOSE UR QL STRIP: NEGATIVE
HBA1C MFR BLD: 5 % (ref 4–5.6)
HCT VFR BLD AUTO: 47.8 % (ref 40–54)
HDLC SERPL-MCNC: 32 MG/DL (ref 40–75)
HDLC SERPL: 26.4 % (ref 20–50)
HGB BLD-MCNC: 15.6 GM/DL (ref 14–18)
HGB UR QL STRIP: NEGATIVE
IMM GRANULOCYTES # BLD AUTO: 0.01 K/UL (ref 0–0.04)
IMM GRANULOCYTES NFR BLD AUTO: 0.1 % (ref 0–0.5)
KETONES UR QL STRIP: NEGATIVE
LDLC SERPL CALC-MCNC: 52.4 MG/DL (ref 63–159)
LEUKOCYTE ESTERASE UR QL STRIP: NEGATIVE
LYMPHOCYTES # BLD AUTO: 3.71 K/UL (ref 1–4.8)
MCH RBC QN AUTO: 32 PG (ref 27–31)
MCHC RBC AUTO-ENTMCNC: 32.6 G/DL (ref 32–36)
MCV RBC AUTO: 98 FL (ref 82–98)
NITRITE UR QL STRIP: NEGATIVE
NONHDLC SERPL-MCNC: 89 MG/DL
NUCLEATED RBC (/100WBC) (OHS): 0 /100 WBC
PH UR STRIP: 6 [PH]
PLATELET # BLD AUTO: 200 K/UL (ref 150–450)
PMV BLD AUTO: 10.8 FL (ref 9.2–12.9)
POTASSIUM SERPL-SCNC: 4.3 MMOL/L (ref 3.5–5.1)
PROT SERPL-MCNC: 7.4 GM/DL (ref 6–8.4)
PROT UR QL STRIP: NEGATIVE
PSA SERPL-MCNC: 2.15 NG/ML
RBC # BLD AUTO: 4.87 M/UL (ref 4.6–6.2)
RELATIVE EOSINOPHIL (OHS): 0.8 %
RELATIVE LYMPHOCYTE (OHS): 49.6 % (ref 18–48)
RELATIVE MONOCYTE (OHS): 5.5 % (ref 4–15)
RELATIVE NEUTROPHIL (OHS): 43.9 % (ref 38–73)
SODIUM SERPL-SCNC: 142 MMOL/L (ref 136–145)
SP GR UR STRIP: 1.01
TRIGL SERPL-MCNC: 183 MG/DL (ref 30–150)
TSH SERPL-ACNC: 1.86 UIU/ML (ref 0.4–4)
URATE SERPL-MCNC: 6.4 MG/DL (ref 3.4–7)
UROBILINOGEN UR STRIP-ACNC: NEGATIVE EU/DL
WBC # BLD AUTO: 7.48 K/UL (ref 3.9–12.7)

## 2025-04-24 PROCEDURE — 81003 URINALYSIS AUTO W/O SCOPE: CPT | Mod: HCNC

## 2025-04-24 PROCEDURE — 80061 LIPID PANEL: CPT | Mod: HCNC

## 2025-04-24 PROCEDURE — 85025 COMPLETE CBC W/AUTO DIFF WBC: CPT | Mod: HCNC

## 2025-04-24 PROCEDURE — 83036 HEMOGLOBIN GLYCOSYLATED A1C: CPT | Mod: HCNC

## 2025-04-24 PROCEDURE — 84153 ASSAY OF PSA TOTAL: CPT | Mod: HCNC

## 2025-04-24 PROCEDURE — 84550 ASSAY OF BLOOD/URIC ACID: CPT | Mod: HCNC

## 2025-04-24 PROCEDURE — 84443 ASSAY THYROID STIM HORMONE: CPT | Mod: HCNC

## 2025-04-24 PROCEDURE — 80053 COMPREHEN METABOLIC PANEL: CPT | Mod: HCNC

## 2025-04-24 PROCEDURE — 36415 COLL VENOUS BLD VENIPUNCTURE: CPT | Mod: HCNC,PO

## 2025-05-01 ENCOUNTER — OFFICE VISIT (OUTPATIENT)
Dept: INTERNAL MEDICINE | Facility: CLINIC | Age: 80
End: 2025-05-01
Payer: MEDICARE

## 2025-05-01 VITALS
BODY MASS INDEX: 27.47 KG/M2 | WEIGHT: 202.81 LBS | HEART RATE: 64 BPM | HEIGHT: 72 IN | SYSTOLIC BLOOD PRESSURE: 122 MMHG | TEMPERATURE: 97 F | DIASTOLIC BLOOD PRESSURE: 60 MMHG | OXYGEN SATURATION: 97 % | RESPIRATION RATE: 16 BRPM

## 2025-05-01 DIAGNOSIS — I48.0 PAROXYSMAL ATRIAL FIBRILLATION: ICD-10-CM

## 2025-05-01 DIAGNOSIS — G47.33 OSA ON CPAP: ICD-10-CM

## 2025-05-01 DIAGNOSIS — E78.49 OTHER HYPERLIPIDEMIA: ICD-10-CM

## 2025-05-01 DIAGNOSIS — M54.50 CHRONIC LOW BACK PAIN, UNSPECIFIED BACK PAIN LATERALITY, UNSPECIFIED WHETHER SCIATICA PRESENT: ICD-10-CM

## 2025-05-01 DIAGNOSIS — G89.29 CHRONIC LOW BACK PAIN, UNSPECIFIED BACK PAIN LATERALITY, UNSPECIFIED WHETHER SCIATICA PRESENT: ICD-10-CM

## 2025-05-01 DIAGNOSIS — I10 ESSENTIAL HYPERTENSION: Primary | ICD-10-CM

## 2025-05-01 PROCEDURE — 1101F PT FALLS ASSESS-DOCD LE1/YR: CPT | Mod: CPTII,S$GLB,, | Performed by: INTERNAL MEDICINE

## 2025-05-01 PROCEDURE — 3074F SYST BP LT 130 MM HG: CPT | Mod: CPTII,S$GLB,, | Performed by: INTERNAL MEDICINE

## 2025-05-01 PROCEDURE — 99214 OFFICE O/P EST MOD 30 MIN: CPT | Mod: S$GLB,,, | Performed by: INTERNAL MEDICINE

## 2025-05-01 PROCEDURE — 3078F DIAST BP <80 MM HG: CPT | Mod: CPTII,S$GLB,, | Performed by: INTERNAL MEDICINE

## 2025-05-01 PROCEDURE — 1160F RVW MEDS BY RX/DR IN RCRD: CPT | Mod: CPTII,S$GLB,, | Performed by: INTERNAL MEDICINE

## 2025-05-01 PROCEDURE — 1159F MED LIST DOCD IN RCRD: CPT | Mod: CPTII,S$GLB,, | Performed by: INTERNAL MEDICINE

## 2025-05-01 PROCEDURE — 99999 PR PBB SHADOW E&M-EST. PATIENT-LVL IV: CPT | Mod: PBBFAC,,, | Performed by: INTERNAL MEDICINE

## 2025-05-01 PROCEDURE — 1126F AMNT PAIN NOTED NONE PRSNT: CPT | Mod: CPTII,S$GLB,, | Performed by: INTERNAL MEDICINE

## 2025-05-01 PROCEDURE — 3288F FALL RISK ASSESSMENT DOCD: CPT | Mod: CPTII,S$GLB,, | Performed by: INTERNAL MEDICINE

## 2025-05-01 NOTE — PROGRESS NOTES
Subjective:       Patient ID: Maegan Cleary is a 79 y.o. male.    Chief Complaint: Annual Exam (With labs to revu)    History of Present Illness      aMegan presents today for annual follow-up.  Active medical conditions include hypertension, hyperlipidemia, obstructive sleep apnea-on CPAP therapy, chronic lower back pain, osteopenia, history of atrial fibrillation-resolved with the ablation therapy.  The patient reports he is doing well.  He remains physically active.    ESSENTIAL HYPERTENSION:  Current blood pressure medications include hydrochlorothiazide, losartan, and Bystolic.  No medication side effects are noted.  The patient has been satisfied with his level of blood pressure control.    CARDIOVASCULAR:  He underwent new field pulse ablation on March 6, 2025 and returned to normal sinus rhythm by March 17th. Prior to the procedure, he experienced an AFib episode before Christmas.A follow-up visit with cardiologist is scheduled for July. Family history notable for AFib in mother and older brother.  The patient is not experiencing any exertional dyspnea, chest pain, or palpitations.    SLEEP:  He uses CPAP machine for sleep apnea with good compliance, sleeping 8-9 hours per night.  No daytime somnolence is noted.    MUSCULOSKELETAL:  He experiences chronic lower back pain with occasional sciatica managed with daily stretching exercises and takes medication once nightly for back pain. He has osteoporosis of the lumbar spine, which he manages through calcium and vitamin D rich diet including yogurt and cheese.    MEDICATIONS:  Currently taking Eliquis (planned discontinuation next month), B complex, and B12 supplements daily. Uses Pepcid as needed for reflux symptoms. He denies any medication side effects.    ALLERGIES:  He experiences post-nasal drip and takes Allegra occasionally for allergy symptoms.      ROS:  ENT: -sore throat, +post nasal drip  Respiratory: +apnea  Gastrointestinal:  +indigestion  Musculoskeletal: +back pain, +limb pain, -muscle weakness  Allergic: +frequent sneezing, +seasonal allergies              Physical Exam  Vitals and nursing note reviewed.   Constitutional:       General: He is not in acute distress.     Appearance: Normal appearance. He is well-developed.   HENT:      Head: Normocephalic and atraumatic.      Right Ear: External ear normal.      Left Ear: External ear normal.      Mouth/Throat:      Pharynx: Oropharynx is clear. No oropharyngeal exudate.   Eyes:      General: No scleral icterus.     Extraocular Movements: Extraocular movements intact.      Conjunctiva/sclera: Conjunctivae normal.   Neck:      Thyroid: No thyromegaly.      Vascular: No JVD.   Cardiovascular:      Rate and Rhythm: Normal rate and regular rhythm.      Pulses: Normal pulses.      Heart sounds: Normal heart sounds. No murmur heard.     No friction rub. No gallop.   Pulmonary:      Effort: Pulmonary effort is normal. No respiratory distress.      Breath sounds: Normal breath sounds. No wheezing or rales.   Abdominal:      General: Bowel sounds are normal. There is no distension.      Palpations: Abdomen is soft. There is no mass.      Tenderness: There is no abdominal tenderness. There is no guarding.   Musculoskeletal:         General: No tenderness. Normal range of motion.      Cervical back: Normal range of motion and neck supple.      Right lower leg: No edema.      Left lower leg: No edema.   Lymphadenopathy:      Cervical: No cervical adenopathy.   Skin:     General: Skin is warm and dry.      Findings: No rash.   Neurological:      General: No focal deficit present.      Mental Status: He is alert and oriented to person, place, and time.      Cranial Nerves: No cranial nerve deficit.      Motor: No abnormal muscle tone.      Deep Tendon Reflexes: Reflexes are normal and symmetric.   Psychiatric:         Behavior: Behavior normal.         Thought Content: Thought content normal.            Lab Visit on 04/24/2025   Component Date Value Ref Range Status    Sodium 04/24/2025 142  136 - 145 mmol/L Final    Potassium 04/24/2025 4.3  3.5 - 5.1 mmol/L Final    Chloride 04/24/2025 103  95 - 110 mmol/L Final    CO2 04/24/2025 29  23 - 29 mmol/L Final    Glucose 04/24/2025 99  70 - 110 mg/dL Final    BUN 04/24/2025 14  8 - 23 mg/dL Final    Creatinine 04/24/2025 1.0  0.5 - 1.4 mg/dL Final    Calcium 04/24/2025 9.8  8.7 - 10.5 mg/dL Final    Protein Total 04/24/2025 7.4  6.0 - 8.4 gm/dL Final    Albumin 04/24/2025 4.0  3.5 - 5.2 g/dL Final    Bilirubin Total 04/24/2025 0.9  0.1 - 1.0 mg/dL Final    For infants and newborns, interpretation of results should be based   on gestational age, weight and in agreement with clinical   observations.    Premature Infant recommended reference ranges:   0-24 hours:  <8.0 mg/dL   24-48 hours: <12.0 mg/dL   3-5 days:    <15.0 mg/dL   6-29 days:   <15.0 mg/dL    ALP 04/24/2025 68  40 - 150 unit/L Final    AST 04/24/2025 27  11 - 45 unit/L Final    ALT 04/24/2025 21  10 - 44 unit/L Final    Anion Gap 04/24/2025 10  8 - 16 mmol/L Final    eGFR 04/24/2025 >60  >60 mL/min/1.73/m2 Final    Estimated GFR calculated using the CKD-EPI creatinine (2021) equation.    Cholesterol Total 04/24/2025 121  120 - 199 mg/dL Final    The National Cholesterol Education Program (NCEP) has set the  following guidelines (reference ranges) for Cholesterol:  Optimal.....................<200 mg/dL  Borderline High.............200-239 mg/dL  High........................> or = 240 mg/dL    Triglyceride 04/24/2025 183 (H)  30 - 150 mg/dL Final    The National Cholesterol Education Program (NCEP) has set the  following guidelines (reference values) for triglycerides:  Normal......................<150 mg/dL  Borderline High.............150-199 mg/dL  High........................200-499 mg/dL    HDL Cholesterol 04/24/2025 32 (L)  40 - 75 mg/dL Final    The National Cholesterol Education Program (NCEP)  has set the   following guidelines (reference values) for HDL Cholesterol:   Low...............<40 mg/dL   Optimal...........>60 mg/dL    LDL Cholesterol 04/24/2025 52.4 (L)  63.0 - 159.0 mg/dL Final    The National Cholesterol Education Program (NCEP) has set the  following guidelines (reference values) for LDL Cholesterol:  Optimal.......................<130 mg/dL  Borderline High...............130-159 mg/dL  High..........................160-189 mg/dL  Very High.....................>190 mg/dL  LDL calculated using the Friedewald equation.    HDL/Cholesterol Ratio 04/24/2025 26.4  20.0 - 50.0 % Final    Cholesterol/HDL Ratio 04/24/2025 3.8  2.0 - 5.0 Final    Non HDL Cholesterol 04/24/2025 89  mg/dL Final    Risk category and Non-HDL cholesterol goals:  Coronary heart disease (CHD)or equivalent (10-year risk of CHD >20%):  Non-HDL cholesterol goal     <130 mg/dL  Two or more CHD risk factors and 10-year risk of CHD <= 20%:  Non-HDL cholesterol goal     <160 mg/dL  0 to 1 CHD risk factor:  Non-HDL cholesterol goal     <190 mg/dL    TSH 04/24/2025 1.857  0.400 - 4.000 uIU/mL Final    Prostate Specific Antigen 04/24/2025 2.15  <=4.00 ng/mL Final    PSA Expected levels:  Hormonal therapy: < 0.05 ng/mL   Prostatectomy: < 0.01 ng/mL   Radiation therapy: < 1.00 ng/mL      Uric Acid 04/24/2025 6.4  3.4 - 7.0 mg/dL Final    Hemoglobin A1c 04/24/2025 5.0  4.0 - 5.6 % Final    ADA Screening Guidelines:  5.7-6.4%  Consistent with prediabetes  >=6.5%  Consistent with diabetes    High levels of fetal hemoglobin interfere with the HbA1C  assay. Heterozygous hemoglobin variants (HbS, HgC, etc)do  not significantly interfere with this assay.   However, presence of multiple variants may affect accuracy.    Estimated Average Glucose 04/24/2025 97  68 - 131 mg/dL Final    Color, UA 04/24/2025 Yellow  Straw, Svetlana, Yellow, Light-Orange Final    Appearance, UA 04/24/2025 Clear  Clear Final    pH, UA 04/24/2025 6.0  5.0 - 8.0 Final     Spec Grav UA 04/24/2025 1.015  1.005 - 1.030 Final    Protein, UA 04/24/2025 Negative  Negative Final    Recommend a 24 hour urine protein or a urine protein/creatinine ratio if globulin induced proteinuria is clinically suspected.    Glucose, UA 04/24/2025 Negative  Negative Final    Ketones, UA 04/24/2025 Negative  Negative Final    Bilirubin, UA 04/24/2025 Negative  Negative Final    Blood, UA 04/24/2025 Negative  Negative Final    Nitrites, UA 04/24/2025 Negative  Negative Final    Urobilinogen, UA 04/24/2025 Negative  <2.0 EU/dL Final    Leukocyte Esterase, UA 04/24/2025 Negative  Negative Final    WBC 04/24/2025 7.48  3.90 - 12.70 K/uL Final    RBC 04/24/2025 4.87  4.60 - 6.20 M/uL Final    HGB 04/24/2025 15.6  14.0 - 18.0 gm/dL Final    HCT 04/24/2025 47.8  40.0 - 54.0 % Final    MCV 04/24/2025 98  82 - 98 fL Final    MCH 04/24/2025 32.0 (H)  27.0 - 31.0 pg Final    MCHC 04/24/2025 32.6  32.0 - 36.0 g/dL Final    RDW 04/24/2025 13.3  11.5 - 14.5 % Final    Platelet Count 04/24/2025 200  150 - 450 K/uL Final    MPV 04/24/2025 10.8  9.2 - 12.9 fL Final    Nucleated RBC 04/24/2025 0  <=0 /100 WBC Final    Neut % 04/24/2025 43.9  38 - 73 % Final    Lymph % 04/24/2025 49.6 (H)  18 - 48 % Final    Mono % 04/24/2025 5.5  4 - 15 % Final    Eos % 04/24/2025 0.8  <=8 % Final    Basophil % 04/24/2025 0.1  <=1.9 % Final    Imm Grans % 04/24/2025 0.1  0.0 - 0.5 % Final    Neut # 04/24/2025 3.28  1.8 - 7.7 K/uL Final    Lymph # 04/24/2025 3.71  1 - 4.8 K/uL Final    Mono # 04/24/2025 0.41  0.3 - 1 K/uL Final    Eos # 04/24/2025 0.06  <=0.5 K/uL Final    Baso # 04/24/2025 0.01  <=0.2 K/uL Final    Imm Grans # 04/24/2025 0.01  0.00 - 0.04 K/uL Final    Mild elevation in immature granulocytes is non specific and can be seen in a variety of conditions including stress response, acute inflammation, trauma and pregnancy. Correlation with other laboratory and clinical findings is essential.       Assessment & Plan:     Assessment  & Plan     Reviewed recent cardiac ablation procedure (new field pulse ablation) performed on March 6th, noting normal sinus rhythm achieved by March 17th.   Assessed use of CPAP machine for sleep apnea, noting good compliance and improved AHI scores; discussed the importance of CPAP compliance and association between sleep apnea and various cardiac issues, including a-fib and heart failure.   Evaluated BP (122/60) and weight, both stable compared to previous visit.   Considered family history of a-fib.    ATRIAL FIBRILLATION:   Maegan had AFib for 24 hours before undergoing new field pulse ablation on March 6.   Currently in normal sinus rhythm post-ablation.   The patient is currently using Eliquis for anticoagulation, which will be discontinued next month.    OBSTRUCTIVE SLEEP APNEA:   Maegan uses CPAP consistently for 8-9 hours nightly with excellent compliance (apnea hypopnea index of 1).   Reports no issues with the device and actively recommends it to others.   Instructed to continue nightly CPAP therapy for sleep apnea management.    LUMBAGO WITH SCIATICA:   Sciatica symptoms are well-controlled with daily stretching exercises.   Advised to continue this regimen for back pain management.    AGE-RELATED OSTEOPENIA:   Bone density shows osteopenia of the spine with normal density in hips.   Maegan plans to improve bone density through dietary modifications.   Recommend maintaining current dietary habits, including foods rich in calcium and vitamin D for bone health.    GASTROESOPHAGEAL REFLUX DISEASE:   Reflux symptoms vary depending on diet.   Prescribed Pepcid for management instead of proton pump inhibitors.    ALLERGIC RHINITIS:   Monitoring allergy symptoms, including post-nasal drip and sneezing.    FAMILY HISTORY OF CARDIOVASCULAR DISEASE:   Documented family history of cardiac conditions including atrial fibrillation.    FOLLOW-UP:   Continue current exercise regimen.   Return in approximately 1 year  for next annual visit.         Follow up in about 1 year (around 5/1/2026).     Leandro Carr MD

## 2025-05-19 ENCOUNTER — E-VISIT (OUTPATIENT)
Dept: INTERNAL MEDICINE | Facility: CLINIC | Age: 80
End: 2025-05-19
Payer: MEDICARE

## 2025-05-19 DIAGNOSIS — M85.80 OSTEOPENIA, UNSPECIFIED LOCATION: Primary | ICD-10-CM

## 2025-05-19 DIAGNOSIS — M54.50 CHRONIC LOW BACK PAIN, UNSPECIFIED BACK PAIN LATERALITY, UNSPECIFIED WHETHER SCIATICA PRESENT: ICD-10-CM

## 2025-05-19 DIAGNOSIS — M51.362 DEGENERATION OF INTERVERTEBRAL DISC OF LUMBAR REGION WITH DISCOGENIC BACK PAIN AND LOWER EXTREMITY PAIN: ICD-10-CM

## 2025-05-19 DIAGNOSIS — M54.16 LUMBAR RADICULOPATHY, CHRONIC: ICD-10-CM

## 2025-05-19 DIAGNOSIS — G89.29 CHRONIC LOW BACK PAIN, UNSPECIFIED BACK PAIN LATERALITY, UNSPECIFIED WHETHER SCIATICA PRESENT: ICD-10-CM

## 2025-05-19 LAB
OHS CV AF BURDEN PERCENT: < 1
OHS CV DC REMOTE DEVICE TYPE: NORMAL

## 2025-05-20 ENCOUNTER — TELEPHONE (OUTPATIENT)
Dept: INTERNAL MEDICINE | Facility: CLINIC | Age: 80
End: 2025-05-20
Payer: MEDICARE

## 2025-05-20 NOTE — TELEPHONE ENCOUNTER
----- Message from Leandro Carr MD sent at 5/19/2025 10:26 PM CDT -----  Order for MRI of lumbar spine has been entered.

## 2025-05-20 NOTE — PROGRESS NOTES
Patient ID: Maegan Cleary is a 79 y.o. male.    Chief Complaint: General Illness (Entered automatically based on patient selection in [employer].)    The patient initiated a request through [employer] on 5/19/2025 for evaluation and management with a chief complaint of General Illness (Entered automatically based on patient selection in [employer].)     I evaluated the questionnaire submission on 5/19/2025.    Oh[facility] Peq Evisit [employer]-Muscle,Back,Joint    5/19/2025  4:25 PM CDT - Filed by Patient   What do you need help with? Back Problem   Do you agree to participate in an E-Visit? Yes   If you have any of the following symptoms, please present to your local emergency room or call 911: I acknowledge   What is the main issue you would like addressed today? I need toEdication to treat my Lumbar osteoporosis (Tscore-4) i have been usind Gabapentin 30@mg tid ,physical Rx and Gym 50minties  4times /week and sti have Back pain and leg pain I am asking Dr GEN Carr to start on Boniva Generic to take 1 /month d   Where are you having pain? Lower Back   Does the pain extend into your legs? Yes, into both legs   How bad is the pain? The pain is moderate   Did you have an injury that caused the pain? No, I cannot remember an injury   How long has the pain been present? More than 1 week but less then 4 weeks   Have you had back pain in the past? Yes, I have many times had pain similiar to this before   Please list any medications or treatments you have used for back pain and indicate if it was effective or not. [id number]  ,Losartsn 50 mg, HCTZ12.5 mg [id number],recently Eliquis 5mgbid since my Ablation on March 6 /2015 and will stop june 9/2025 and Multivitamin ,B12  itsm D3 every othet day ,CoQ10 200mg Mg 240bid  I had Normal Lab 4/24/2025 n   Do you have a fever? No   Do you have any of the following? None of the above   What makes the pain worse? Bending over;  Sitting down   What makes the pain better? Hot or cold compress;   Lying in bed;  Over the counter pain medicine   Have you ever been diagnosed with cancer? No   Have you ever been diagnosed with degenerative disc disease (arthritis of the spine)? No   Have you ever been diagnosed with osteoporosis or any other bone weakness? Yes   Have you ever had surgery on your back or spine? No   What is your usual health status? I am active and can move normally   Provide any additional information you feel is important. I go to the Gym and I feel better   Please attach any relevant images or files    Are you able to take your vital signs? Yes   Systolic Blood Pressure: 112   Diastolic Blood Pressure: 64   Weight: 202   Height: 72   Pulse: 60   Temperature: 98   Respiration rate: 14   Pulse Oxygen: 97         Encounter Diagnoses   Name Primary?    Osteopenia, unspecified location Yes    Chronic low back pain, unspecified back pain laterality, unspecified whether sciatica present     Degeneration of intervertebral disc of lumbar region with discogenic back pain and lower extremity pain     Lumbar radiculopathy, chronic         Orders Placed This Encounter   Procedures    MRI Lumbar Spine Without Contrast     Standing Status:   Future     Expected Date:   5/19/2025     Expiration Date:   5/19/2026     Does the patient have or ever had a pacemaker or a defibrillator (Note: Some facilities may not be able to schedule an MRI for patients with pacemakers and defibrillators. You should contact your local radiology dept to determine if this is the case.)?:   No     Does the patient have an aneurysm or surgical clip, pump, nerve/brain stimulator, middle/inner ear prosthesis, or other metal implant or foreign object (bullet, shrapnel)? If they have a card related to their implant, ask them to bring it. Issues related to the implant may cause the MRI to be delayed.:   No     Will the patient require po anxiolysis or sedation?:   No     May the Radiologist modify the order per protocol to meet the  clinical needs of the patient?:   Yes     Recist criteria?:   Yes     Will this service be billed to a Worker's Comp policy?:   No     Does the patient have on a skin patch for medication with aluminized backing?:   No     Does the patient wear a continuous glucose monitor?:   No     Is this part of a Research Study?:   No     Release to patient:   Immediate        The bone density report cited was reviewed.  The report of lumbar spine bone density of -4 appears to be an error.  Reviewing the bone density data table shows the T-score to actually be -1.4 (at L1). This is in the osteopenia range with a low risk of fracture.    In view of the continuing pain reported, updated MR imaging of the lumbar spine is recommended.    Follow up if symptoms worsen or fail to improve.      E-Visit Time Tracking: 15 minutes.

## 2025-05-22 ENCOUNTER — CLINICAL SUPPORT (OUTPATIENT)
Dept: CARDIOLOGY | Facility: HOSPITAL | Age: 80
End: 2025-05-22
Attending: INTERNAL MEDICINE
Payer: MEDICARE

## 2025-05-22 ENCOUNTER — CLINICAL SUPPORT (OUTPATIENT)
Dept: CARDIOLOGY | Facility: HOSPITAL | Age: 80
End: 2025-05-22
Payer: MEDICARE

## 2025-05-22 DIAGNOSIS — I49.8 OTHER SPECIFIED CARDIAC ARRHYTHMIAS: ICD-10-CM

## 2025-05-22 PROCEDURE — 93298 REM INTERROG DEV EVAL SCRMS: CPT | Mod: 26,,, | Performed by: INTERNAL MEDICINE

## 2025-05-22 PROCEDURE — 93298 REM INTERROG DEV EVAL SCRMS: CPT | Performed by: INTERNAL MEDICINE

## 2025-05-29 NOTE — PROGRESS NOTES
Madiha is a patient of Dr. Araya and was last seen in clinic 1/10/2025.      Subjective:   Patient ID:  Maegan Cleary is a 79 y.o. male who presents for follow up of Atrial Fibrillation  .     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     Background:    Noted increasing palpitations.  ILR implanted 7/17/10.  Bystolic initiated.     1/10/2025: ILR has revealed increasing AF, despite increasing Bystolic.  ILR reviewed, reveals AF up to 39 hours, last episode 1/9/24   Increasing atrial fibrillation, despite increasing beta blocker.  Discussed options at length, including anti-arrhythmic therapy vs PVI (PFA).  Risks and benefits of PVI with PFA discussed, including posterior wall isolation >> he would like to proceed.  Hold Eliquis morning of procedure.    Update (06/09/2025):    3/6/2025:    Successful pulmonary vein RF ablation.    3D mapping performed with Ensite.    Cardioversion.    Intracardiac echo.    Posterior wall isolation.    Today he says he feels well. Never really felt palpitations. No cardiac complaints. Denies palps, VALLADARES, CP, LH, syncope. Is in PT for back pain.     He is currently taking eliquis 5mg BID for stroke prophylaxis and denies significant bleeding episodes. He is currently being treated with bystolic 10mg BID for HR control.  Kidney function is stable, with a creatinine of 1 on 4/24/2025.    Loop shows <1% AF.  Appear to be false episodes,  c/w SR with ectopy. Longest episode ~ 38 min.     I have personally reviewed the patient's EKG today, which shows sinus rhythm with PAC at 63bpm. NJ interval is 150. QRS is 136. QTc is 480.    Relevant Cardiac Test Results:    RONNY (3/6/2025):    Left Ventricle: The left ventricle is normal in size. Mildly increased wall thickness. There is normal systolic function with a visually estimated ejection fraction of 55 - 60%.    Left Atrium: mildly dilated The left atrial  appendage has a windsock morphology. Appendage velocity is normal at greater than 40 cm/sec. There is no thrombus in the left atrial appendage.    Aortic Valve: The aortic valve is a trileaflet valve. There is mild aortic valve sclerosis. There is mild annular calcification present. There is mild aortic regurgitation.    Mitral Valve: There is mild regurgitation.    Aorta: Grade 2 atherosclerosis of the descending aorta and in the aortic arch with mild thickening.    RONNY performed prior to PVI in the EP lab; there is no evidence of intracardiac thrombus.    Current Outpatient Medications   Medication Sig    apixaban (ELIQUIS) 5 mg Tab Take 1 tablet (5 mg total) by mouth 2 (two) times daily.    cholecalciferol, vitamin D3, (VITAMIN D3) 50 mcg (2,000 unit) Tab Take 1 tablet by mouth once daily.     co-enzyme Q-10 30 mg capsule Take 100 mg by mouth once daily.    cyanocobalamin 2000 MCG tablet Take 5,000 mcg by mouth once daily.     gabapentin (NEURONTIN) 300 MG capsule Take 1 capsule (300 mg total) by mouth 3 (three) times daily as needed.    hydroCHLOROthiazide 12.5 MG Tab Take 1 tablet (12.5 mg total) by mouth once daily.    hydrOXYzine pamoate (VISTARIL) 50 MG Cap TAKE 1 CAPSULE BY MOUTH IN THE EVENING    losartan (COZAAR) 50 MG tablet Take 1 tablet (50 mg total) by mouth once daily.    nebivoloL (BYSTOLIC) 10 MG Tab Take 1 tablet (10 mg total) by mouth 2 (two) times daily.    rosuvastatin (CRESTOR) 10 MG tablet Take 1 tablet (10 mg total) by mouth once daily.    SAW PALMETTO ORAL Take 1 tablet by mouth once daily.     No current facility-administered medications for this visit.     Facility-Administered Medications Ordered in Other Visits   Medication    0.9%  NaCl infusion       Review of Systems   Constitutional: Negative for malaise/fatigue.   Cardiovascular:  Negative for chest pain, dyspnea on exertion, irregular heartbeat, leg swelling and palpitations.   Respiratory:  Negative for shortness of breath.     Hematologic/Lymphatic: Negative for bleeding problem.   Skin:  Negative for rash.   Musculoskeletal:  Positive for back pain. Negative for myalgias.   Gastrointestinal:  Negative for hematemesis, hematochezia and nausea.   Genitourinary:  Negative for hematuria.   Neurological:  Negative for light-headedness.   Psychiatric/Behavioral:  Negative for altered mental status.    Allergic/Immunologic: Negative for persistent infections.       Objective:          BP (!) 118/54 (Patient Position: Sitting)   Pulse 63   Wt 90.4 kg (199 lb 4.7 oz)   BMI 27.03 kg/m²     Physical Exam  Vitals and nursing note reviewed.   Constitutional:       Appearance: Normal appearance. He is well-developed.   HENT:      Head: Normocephalic.      Nose: Nose normal.   Eyes:      Pupils: Pupils are equal, round, and reactive to light.   Cardiovascular:      Rate and Rhythm: Normal rate and regular rhythm.   Pulmonary:      Effort: No respiratory distress.   Musculoskeletal:         General: Normal range of motion.   Skin:     General: Skin is warm and dry.      Findings: No erythema.   Neurological:      Mental Status: He is alert and oriented to person, place, and time.   Psychiatric:         Speech: Speech normal.         Behavior: Behavior normal.           Lab Results   Component Value Date     04/24/2025     02/27/2025    K 4.3 04/24/2025    K 4.5 02/27/2025    MG 2.1 12/31/2016    BUN 14 04/24/2025    CREATININE 1.0 04/24/2025    ALT 21 04/24/2025    ALT 24 04/02/2024    AST 27 04/24/2025    AST 32 04/02/2024    HGB 15.6 04/24/2025    HGB 15.4 02/27/2025    HCT 47.8 04/24/2025    HCT 46.6 02/27/2025    HCT 45 02/17/2019    TSH 1.857 04/24/2025    TSH 2.068 04/02/2024    LDLCALC 52.4 (L) 04/24/2025       Recent Labs   Lab 01/09/24  0930 02/27/25  0711   INR 1.0 1.0       Assessment:     1. Paroxysmal atrial fibrillation    2. Essential hypertension    3. CRIS on CPAP    4. 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   He is 3 mo s/p PVI and PWI. RONNY showed normal LVEF. He is feeling well with no cardiac complaints. He is eager to stop OAC now that he is out of the blanking period. To date, no confirmed AF identified on loop. Available episodes more c/w SR with PACs. Ok to stop eliquis at this time and continue monitoring for AF via loop. Continue bystolic.    Stop eliquis  Continue other meds  Continue loop monitoring  RTC 6 mo, sooner if needed    *A copy of this note has been sent to Dr. Araya*    Follow up in about 6 months (around 12/9/2025).      ------------------------------------------------------------------    HALEIGH Galindo, NP-C  Cardiac Electrophysiology

## 2025-05-31 ENCOUNTER — HOSPITAL ENCOUNTER (OUTPATIENT)
Dept: RADIOLOGY | Facility: HOSPITAL | Age: 80
Discharge: HOME | End: 2025-05-31
Attending: INTERNAL MEDICINE
Payer: MEDICARE

## 2025-05-31 DIAGNOSIS — M54.16 LUMBAR RADICULOPATHY, CHRONIC: ICD-10-CM

## 2025-05-31 PROCEDURE — 72148 MRI LUMBAR SPINE W/O DYE: CPT | Mod: TC

## 2025-05-31 PROCEDURE — 72148 MRI LUMBAR SPINE W/O DYE: CPT | Mod: 26,,, | Performed by: RADIOLOGY

## 2025-06-02 ENCOUNTER — TELEPHONE (OUTPATIENT)
Dept: INTERNAL MEDICINE | Facility: CLINIC | Age: 80
End: 2025-06-02
Payer: MEDICARE

## 2025-06-02 ENCOUNTER — PATIENT MESSAGE (OUTPATIENT)
Dept: INTERNAL MEDICINE | Facility: CLINIC | Age: 80
End: 2025-06-02
Payer: MEDICARE

## 2025-06-02 DIAGNOSIS — S76.319A HAMSTRING MUSCLE STRAIN, UNSPECIFIED LATERALITY, INITIAL ENCOUNTER: ICD-10-CM

## 2025-06-02 DIAGNOSIS — R29.898 MUSCULAR DECONDITIONING: ICD-10-CM

## 2025-06-02 DIAGNOSIS — G89.29 CHRONIC LOW BACK PAIN, UNSPECIFIED BACK PAIN LATERALITY, UNSPECIFIED WHETHER SCIATICA PRESENT: Primary | ICD-10-CM

## 2025-06-02 DIAGNOSIS — M54.50 CHRONIC LOW BACK PAIN, UNSPECIFIED BACK PAIN LATERALITY, UNSPECIFIED WHETHER SCIATICA PRESENT: Primary | ICD-10-CM

## 2025-06-09 ENCOUNTER — HOSPITAL ENCOUNTER (OUTPATIENT)
Dept: CARDIOLOGY | Facility: CLINIC | Age: 80
Discharge: HOME | End: 2025-06-09
Payer: MEDICARE

## 2025-06-09 ENCOUNTER — OFFICE VISIT (OUTPATIENT)
Dept: ELECTROPHYSIOLOGY | Facility: CLINIC | Age: 80
End: 2025-06-09
Payer: MEDICARE

## 2025-06-09 VITALS
BODY MASS INDEX: 27.03 KG/M2 | DIASTOLIC BLOOD PRESSURE: 54 MMHG | WEIGHT: 199.31 LBS | HEART RATE: 63 BPM | SYSTOLIC BLOOD PRESSURE: 118 MMHG

## 2025-06-09 DIAGNOSIS — Z79.01 ON ANTICOAGULANT THERAPY: ICD-10-CM

## 2025-06-09 DIAGNOSIS — I48.0 PAROXYSMAL ATRIAL FIBRILLATION: ICD-10-CM

## 2025-06-09 DIAGNOSIS — G47.33 OSA ON CPAP: ICD-10-CM

## 2025-06-09 DIAGNOSIS — I10 ESSENTIAL HYPERTENSION: ICD-10-CM

## 2025-06-09 DIAGNOSIS — I48.0 PAROXYSMAL ATRIAL FIBRILLATION: Primary | ICD-10-CM

## 2025-06-09 LAB
OHS QRS DURATION: 136 MS
OHS QTC CALCULATION: 480 MS

## 2025-06-09 PROCEDURE — 93010 ELECTROCARDIOGRAM REPORT: CPT | Mod: S$GLB,,, | Performed by: INTERNAL MEDICINE

## 2025-06-09 PROCEDURE — 99999 PR PBB SHADOW E&M-EST. PATIENT-LVL III: CPT | Mod: PBBFAC,,, | Performed by: NURSE PRACTITIONER

## 2025-06-16 LAB
OHS CV AF BURDEN PERCENT: < 1
OHS CV DC REMOTE DEVICE TYPE: NORMAL

## 2025-06-22 ENCOUNTER — CLINICAL SUPPORT (OUTPATIENT)
Dept: CARDIOLOGY | Facility: HOSPITAL | Age: 80
End: 2025-06-22
Attending: INTERNAL MEDICINE
Payer: MEDICARE

## 2025-06-22 ENCOUNTER — CLINICAL SUPPORT (OUTPATIENT)
Dept: CARDIOLOGY | Facility: HOSPITAL | Age: 80
End: 2025-06-22
Payer: MEDICARE

## 2025-06-22 DIAGNOSIS — I49.8 OTHER SPECIFIED CARDIAC ARRHYTHMIAS: ICD-10-CM

## 2025-06-22 PROCEDURE — 93298 REM INTERROG DEV EVAL SCRMS: CPT | Performed by: INTERNAL MEDICINE

## 2025-06-22 PROCEDURE — 93298 REM INTERROG DEV EVAL SCRMS: CPT | Mod: 26,,, | Performed by: INTERNAL MEDICINE

## 2025-07-02 DIAGNOSIS — I25.10 CORONARY ARTERY CALCIFICATION: ICD-10-CM

## 2025-07-02 RX ORDER — ROSUVASTATIN CALCIUM 10 MG/1
10 TABLET, COATED ORAL DAILY
Qty: 90 TABLET | Refills: 3 | Status: SHIPPED | OUTPATIENT
Start: 2025-07-02 | End: 2026-06-27

## 2025-07-02 RX ORDER — LOSARTAN POTASSIUM 50 MG/1
50 TABLET ORAL DAILY
Qty: 90 TABLET | Refills: 3 | Status: SHIPPED | OUTPATIENT
Start: 2025-07-02

## 2025-07-02 RX ORDER — HYDROCHLOROTHIAZIDE 12.5 MG/1
12.5 TABLET ORAL DAILY
Qty: 90 TABLET | Refills: 3 | Status: SHIPPED | OUTPATIENT
Start: 2025-07-02

## 2025-07-02 NOTE — TELEPHONE ENCOUNTER
No care due was identified.  Health [facility] Embedded Care Due Messages. Reference number: [id number].   7/02/2025 10:02:22 AM CDT

## 2025-07-02 NOTE — TELEPHONE ENCOUNTER
Refill Decision Note   Maegan Cleary  is requesting a refill authorization.  Brief Assessment and Rationale for Refill:  Approve     Medication Therapy Plan:        Comments:     Note composed:1:18 PM 07/02/2025

## 2025-07-02 NOTE — TELEPHONE ENCOUNTER
Maegan Cleary  is requesting a refill authorization.  Brief Assessment and Rationale for Refill:  Approve     Medication Therapy Plan:         Comments:     Note composed:12:23 PM 07/02/2025

## 2025-07-02 NOTE — TELEPHONE ENCOUNTER
No care due was identified.  Health [facility] Embedded Care Due Messages. Reference number: [id number].   7/02/2025 10:02:48 AM CDT

## 2025-07-02 NOTE — TELEPHONE ENCOUNTER
No care due was identified.  [facility] Embedded Care Due Messages. Reference number: [id number].   7/02/2025 10:04:32 AM CDT

## 2025-07-16 LAB
OHS CV AF BURDEN PERCENT: < 1
OHS CV DC REMOTE DEVICE TYPE: NORMAL

## 2025-07-23 ENCOUNTER — CLINICAL SUPPORT (OUTPATIENT)
Dept: CARDIOLOGY | Facility: HOSPITAL | Age: 80
End: 2025-07-23
Attending: INTERNAL MEDICINE
Payer: MEDICARE

## 2025-07-23 ENCOUNTER — CLINICAL SUPPORT (OUTPATIENT)
Dept: CARDIOLOGY | Facility: HOSPITAL | Age: 80
End: 2025-07-23
Payer: MEDICARE

## 2025-07-23 DIAGNOSIS — I49.8 OTHER SPECIFIED CARDIAC ARRHYTHMIAS: ICD-10-CM

## 2025-07-23 PROCEDURE — 93298 REM INTERROG DEV EVAL SCRMS: CPT | Mod: 26,,, | Performed by: INTERNAL MEDICINE

## 2025-07-23 PROCEDURE — 93298 REM INTERROG DEV EVAL SCRMS: CPT | Performed by: INTERNAL MEDICINE

## 2025-08-01 LAB
OHS CV AF BURDEN PERCENT: < 1
OHS CV DC REMOTE DEVICE TYPE: NORMAL

## 2025-08-23 ENCOUNTER — CLINICAL SUPPORT (OUTPATIENT)
Dept: CARDIOLOGY | Facility: HOSPITAL | Age: 80
End: 2025-08-23
Attending: INTERNAL MEDICINE
Payer: MEDICARE

## 2025-08-23 ENCOUNTER — CLINICAL SUPPORT (OUTPATIENT)
Dept: CARDIOLOGY | Facility: HOSPITAL | Age: 80
End: 2025-08-23
Payer: MEDICARE

## 2025-08-23 DIAGNOSIS — I49.8 OTHER SPECIFIED CARDIAC ARRHYTHMIAS: ICD-10-CM

## 2025-08-23 PROCEDURE — 93298 REM INTERROG DEV EVAL SCRMS: CPT | Performed by: INTERNAL MEDICINE

## 2025-08-23 PROCEDURE — 93298 REM INTERROG DEV EVAL SCRMS: CPT | Mod: 26,,, | Performed by: INTERNAL MEDICINE

## 2025-09-04 LAB — OHS CV DC REMOTE DEVICE TYPE: NORMAL

## (undated) DEVICE — PACK EP DRAPE OMC

## (undated) DEVICE — R CATH BIDIRECTIONL DF CRV 7FR

## (undated) DEVICE — CATH ADVISOR VL CIRC MAP BI-D

## (undated) DEVICE — KIT ENSITE ELECTRODE SURFACE

## (undated) DEVICE — INTRO FAST-CATH SL1 8.5FR 63CM

## (undated) DEVICE — SHEATH STEERABLE CLEAR

## (undated) DEVICE — COVER SITE-RITE PROBE 96IN

## (undated) DEVICE — ADHESIVE DERMABOND ADVANCED

## (undated) DEVICE — PAD DEFIB CADENCE ADULT R2

## (undated) DEVICE — R CATH ACUSON ACUNAV 8FR

## (undated) DEVICE — INTRODUCER HEMOSTASIS 7.5F

## (undated) DEVICE — DRAPE OPTIMA MAJOR PEDIATRIC

## (undated) DEVICE — BANDAGE ADHESIVE

## (undated) DEVICE — DRAPE PED LAP SURG 108X77IN

## (undated) DEVICE — GUIDEWIRE ROSEN VAS JTIP 180CM

## (undated) DEVICE — SHEATH HEMOSTASIS 8.5FR

## (undated) DEVICE — PACK PACER PERMANENT OMC

## (undated) DEVICE — GUIDEWIRE AMPLATZ XSTIFF 180CM

## (undated) DEVICE — BASIN SPLASH SHLD W/PAD BLUE

## (undated) DEVICE — ELECTRODE REM PLYHSV RETURN 9

## (undated) DEVICE — COVER PRB TRNSDUC 7.6X183CM

## (undated) DEVICE — LINE PRESSURE MONITORING 96IN

## (undated) DEVICE — NDL TRNSSPTL BRK-1 18GA 71CM

## (undated) DEVICE — DILATOR COONS TAPER 14FR

## (undated) DEVICE — CATH ABLATION PULS FIELD 31MM

## (undated) DEVICE — SCALPEL #11 BLADE STRL DISP